# Patient Record
Sex: FEMALE | Race: BLACK OR AFRICAN AMERICAN | NOT HISPANIC OR LATINO | Employment: UNEMPLOYED | URBAN - METROPOLITAN AREA
[De-identification: names, ages, dates, MRNs, and addresses within clinical notes are randomized per-mention and may not be internally consistent; named-entity substitution may affect disease eponyms.]

---

## 2017-02-27 ENCOUNTER — HOSPITAL ENCOUNTER (EMERGENCY)
Facility: CLINIC | Age: 32
Discharge: HOME | End: 2017-02-27
Attending: EMERGENCY MEDICINE | Admitting: EMERGENCY MEDICINE
Payer: MEDICAID

## 2017-02-27 ENCOUNTER — APPOINTMENT (OUTPATIENT)
Dept: ULTRASOUND IMAGING | Facility: CLINIC | Age: 32
End: 2017-02-27
Attending: EMERGENCY MEDICINE
Payer: MEDICAID

## 2017-02-27 VITALS
TEMPERATURE: 99 F | RESPIRATION RATE: 16 BRPM | DIASTOLIC BLOOD PRESSURE: 57 MMHG | SYSTOLIC BLOOD PRESSURE: 102 MMHG | HEART RATE: 71 BPM | OXYGEN SATURATION: 99 % | WEIGHT: 130.06 LBS

## 2017-02-27 DIAGNOSIS — Z3A.00 WEEKS OF GESTATION OF PREGNANCY NOT SPECIFIED: ICD-10-CM

## 2017-02-27 DIAGNOSIS — O26.891 PELVIC PAIN AFFECTING PREGNANCY IN FIRST TRIMESTER, ANTEPARTUM: ICD-10-CM

## 2017-02-27 DIAGNOSIS — R10.2 PELVIC PAIN AFFECTING PREGNANCY IN FIRST TRIMESTER, ANTEPARTUM: ICD-10-CM

## 2017-02-27 DIAGNOSIS — O23.41 INFECTION OF URINARY TRACT IN PREGNANCY IN FIRST TRIMESTER: ICD-10-CM

## 2017-02-27 DIAGNOSIS — R10.2 ADNEXAL TENDERNESS, RIGHT: ICD-10-CM

## 2017-02-27 DIAGNOSIS — N39.0 URINARY TRACT INFECTION WITHOUT HEMATURIA, SITE UNSPECIFIED: ICD-10-CM

## 2017-02-27 LAB
ALBUMIN SERPL-MCNC: 4 G/DL (ref 3.4–5)
ALBUMIN UR-MCNC: NEGATIVE MG/DL
ALP SERPL-CCNC: 56 U/L (ref 40–150)
ALT SERPL W P-5'-P-CCNC: 29 U/L (ref 0–50)
ANION GAP SERPL CALCULATED.3IONS-SCNC: 4 MMOL/L (ref 3–14)
APPEARANCE UR: CLEAR
AST SERPL W P-5'-P-CCNC: 26 U/L (ref 0–45)
B-HCG SERPL-ACNC: 335 IU/L
BACTERIA #/AREA URNS HPF: ABNORMAL /HPF
BASOPHILS # BLD AUTO: 0.1 10E9/L (ref 0–0.2)
BASOPHILS NFR BLD AUTO: 0.5 %
BILIRUB SERPL-MCNC: 0.5 MG/DL (ref 0.2–1.3)
BILIRUB UR QL STRIP: NEGATIVE
BUN SERPL-MCNC: 13 MG/DL (ref 7–30)
CALCIUM SERPL-MCNC: 9 MG/DL (ref 8.5–10.1)
CHLORIDE SERPL-SCNC: 109 MMOL/L (ref 94–109)
CO2 SERPL-SCNC: 26 MMOL/L (ref 20–32)
COLOR UR AUTO: YELLOW
CREAT SERPL-MCNC: 0.84 MG/DL (ref 0.52–1.04)
DIFFERENTIAL METHOD BLD: NORMAL
EOSINOPHIL # BLD AUTO: 0.1 10E9/L (ref 0–0.7)
EOSINOPHIL NFR BLD AUTO: 1.3 %
ERYTHROCYTE [DISTWIDTH] IN BLOOD BY AUTOMATED COUNT: 13.5 % (ref 10–15)
GFR SERPL CREATININE-BSD FRML MDRD: 78 ML/MIN/1.7M2
GLUCOSE SERPL-MCNC: 80 MG/DL (ref 70–99)
GLUCOSE UR STRIP-MCNC: NEGATIVE MG/DL
HCG UR QL: POSITIVE
HCT VFR BLD AUTO: 38 % (ref 35–47)
HGB BLD-MCNC: 12.9 G/DL (ref 11.7–15.7)
HGB UR QL STRIP: NEGATIVE
IMM GRANULOCYTES # BLD: 0 10E9/L (ref 0–0.4)
IMM GRANULOCYTES NFR BLD: 0.1 %
INTERNAL QC OK POCT: YES
KETONES UR STRIP-MCNC: NEGATIVE MG/DL
LEUKOCYTE ESTERASE UR QL STRIP: NEGATIVE
LIPASE SERPL-CCNC: 260 U/L (ref 73–393)
LYMPHOCYTES # BLD AUTO: 3.1 10E9/L (ref 0.8–5.3)
LYMPHOCYTES NFR BLD AUTO: 32.9 %
MCH RBC QN AUTO: 31.9 PG (ref 26.5–33)
MCHC RBC AUTO-ENTMCNC: 33.9 G/DL (ref 31.5–36.5)
MCV RBC AUTO: 94 FL (ref 78–100)
MICRO REPORT STATUS: ABNORMAL
MONOCYTES # BLD AUTO: 0.3 10E9/L (ref 0–1.3)
MONOCYTES NFR BLD AUTO: 3.5 %
NEUTROPHILS # BLD AUTO: 5.8 10E9/L (ref 1.6–8.3)
NEUTROPHILS NFR BLD AUTO: 61.7 %
NITRATE UR QL: POSITIVE
NRBC # BLD AUTO: 0 10*3/UL
NRBC BLD AUTO-RTO: 0 /100
PH UR STRIP: 8 PH (ref 5–7)
PLATELET # BLD AUTO: 279 10E9/L (ref 150–450)
POTASSIUM SERPL-SCNC: 4.4 MMOL/L (ref 3.4–5.3)
PROT SERPL-MCNC: 7.8 G/DL (ref 6.8–8.8)
RBC # BLD AUTO: 4.04 10E12/L (ref 3.8–5.2)
RBC #/AREA URNS AUTO: <1 /HPF (ref 0–2)
SODIUM SERPL-SCNC: 139 MMOL/L (ref 133–144)
SP GR UR STRIP: 1.02 (ref 1–1.03)
SPECIMEN SOURCE: ABNORMAL
SQUAMOUS #/AREA URNS AUTO: 4 /HPF (ref 0–1)
URN SPEC COLLECT METH UR: ABNORMAL
UROBILINOGEN UR STRIP-MCNC: NORMAL MG/DL (ref 0–2)
WBC # BLD AUTO: 9.5 10E9/L (ref 4–11)
WBC #/AREA URNS AUTO: 1 /HPF (ref 0–2)
WET PREP SPEC: ABNORMAL

## 2017-02-27 PROCEDURE — 80053 COMPREHEN METABOLIC PANEL: CPT | Performed by: EMERGENCY MEDICINE

## 2017-02-27 PROCEDURE — 81001 URINALYSIS AUTO W/SCOPE: CPT | Performed by: EMERGENCY MEDICINE

## 2017-02-27 PROCEDURE — 99284 EMERGENCY DEPT VISIT MOD MDM: CPT | Mod: Z6 | Performed by: EMERGENCY MEDICINE

## 2017-02-27 PROCEDURE — 76801 OB US < 14 WKS SINGLE FETUS: CPT

## 2017-02-27 PROCEDURE — 84702 CHORIONIC GONADOTROPIN TEST: CPT | Performed by: EMERGENCY MEDICINE

## 2017-02-27 PROCEDURE — 36415 COLL VENOUS BLD VENIPUNCTURE: CPT | Performed by: EMERGENCY MEDICINE

## 2017-02-27 PROCEDURE — 25000132 ZZH RX MED GY IP 250 OP 250 PS 637: Performed by: EMERGENCY MEDICINE

## 2017-02-27 PROCEDURE — 87491 CHLMYD TRACH DNA AMP PROBE: CPT | Performed by: EMERGENCY MEDICINE

## 2017-02-27 PROCEDURE — 83690 ASSAY OF LIPASE: CPT | Performed by: EMERGENCY MEDICINE

## 2017-02-27 PROCEDURE — 81025 URINE PREGNANCY TEST: CPT | Performed by: EMERGENCY MEDICINE

## 2017-02-27 PROCEDURE — 85025 COMPLETE CBC W/AUTO DIFF WBC: CPT | Performed by: EMERGENCY MEDICINE

## 2017-02-27 PROCEDURE — 87210 SMEAR WET MOUNT SALINE/INK: CPT | Performed by: EMERGENCY MEDICINE

## 2017-02-27 PROCEDURE — 99284 EMERGENCY DEPT VISIT MOD MDM: CPT | Mod: 25 | Performed by: EMERGENCY MEDICINE

## 2017-02-27 PROCEDURE — 87591 N.GONORRHOEAE DNA AMP PROB: CPT | Performed by: EMERGENCY MEDICINE

## 2017-02-27 RX ORDER — KETOROLAC TROMETHAMINE 30 MG/ML
30 INJECTION, SOLUTION INTRAMUSCULAR; INTRAVENOUS ONCE
Status: DISCONTINUED | OUTPATIENT
Start: 2017-02-27 | End: 2017-02-27

## 2017-02-27 RX ORDER — ACETAMINOPHEN 325 MG/1
650 TABLET ORAL ONCE
Status: COMPLETED | OUTPATIENT
Start: 2017-02-27 | End: 2017-02-27

## 2017-02-27 RX ORDER — NITROFURANTOIN 25; 75 MG/1; MG/1
100 CAPSULE ORAL 2 TIMES DAILY
Qty: 10 CAPSULE | Refills: 0 | Status: SHIPPED | OUTPATIENT
Start: 2017-02-27 | End: 2017-03-04

## 2017-02-27 RX ADMIN — ACETAMINOPHEN 650 MG: 325 TABLET, FILM COATED ORAL at 13:25

## 2017-02-27 ASSESSMENT — ENCOUNTER SYMPTOMS
FREQUENCY: 0
CHILLS: 1
VOMITING: 0
SHORTNESS OF BREATH: 0
CONSTIPATION: 0
ADENOPATHY: 0
BRUISES/BLEEDS EASILY: 0
BACK PAIN: 1
DYSURIA: 0
COLOR CHANGE: 0
HEMATURIA: 0
FLANK PAIN: 0
NAUSEA: 0
DIFFICULTY URINATING: 0
ABDOMINAL PAIN: 0
NECK PAIN: 0
FEVER: 0
LIGHT-HEADEDNESS: 0
POLYDIPSIA: 0
DIARRHEA: 0
AGITATION: 0

## 2017-02-27 NOTE — ED NOTES
Pt complains of right pelvic pain radiating across abdomin to left side., also complains of right back flank pain.

## 2017-02-27 NOTE — ED AVS SNAPSHOT
[facility], Emergency Department    [address]    [place] [address]    Phone:  [phone]    Fax:  [phone]                                       Akua Gr   MRN: [id number]    Department:  [facility], Emergency Department   Date of Visit:  2/27/2017           Patient Information     Date Of Birth          1985        Your diagnoses for this visit were:     Pelvic pain affecting pregnancy in first trimester, antepartum     Urinary tract infection without hematuria, site unspecified        You were seen by Rigoberto Ramon MD.        Discharge Instructions       Please make an appointment to follow up with OB/Gyn--[facility] (phone: [phone]) in 2 days for repeat pregnancy hormone check since we could not rule out ectopic pregnancy today. Your pregnancy hormone level is 335 today. If your symptoms significantly worsen please come back to the emergency department. You can take Tylenol for pain, if needed.    Abdominal Pain and Early Pregnancy    (To rule out ectopic pregnancy or miscarriage)  Our tests show that you are pregnant, but the exact cause of your pain isn t clear.  Some pain and bleeding are common early in pregnancy. Often they stop, and you can go on to have a normal pregnancy and baby. Other times the pain or bleeding can be signs of a miscarriage or ectopic pregnancy. An ectopic pregnancy is a very serious problem. At this time it is unclear if your pregnancy will continue normally, if you will have a miscarriage, or if you could have an ectopic pregnancy. Below is some information about this.  Miscarriage  At this time we don t know whether you will have a miscarriage, or if things will clear up and your pregnancy will continue normally. We understand that this is emotionally difficult. There is little we can say to change the way you feel. But understand that miscarriages are common.  About 1 or 2 out of every 10 pregnancies end this way. Some end  even before you know you are pregnant. This happens for a number of reasons, and usually we never figure out why. It s important you know that it is not your fault. It didn t happen because you did anything wrong.  Having sex or exercising does not cause a miscarriage. These activities are usually safe unless you have pain or bleeding or your doctor tells you to stop. Even minor falls won t cause a miscarriage. Miscarriages happen because things were not developing as they were supposed to. No medicine can prevent a miscarriage.  Ectopic pregnancy  In a normal pregnancy, the fertilized egg attaches to the wall of the womb (uterus). In an ectopic or tubal pregnancy, the fertilized egg attaches outside the uterus, usually in the fallopian tube. Very rarely, the egg attaches to an ovary or somewhere else in the abdomen. An ectopic pregnancy is much less common than a miscarriage, but it is very serious. The baby cannot survive, and as it grows it can rupture the tube. This can cause internal bleeding and even death. Risk factors for an ectopic are:    An ectopic pregnancy in the past    Pelvic inflammatory disease, or PID    Endometriosis    Smoking    An IUD  Additional tests  Because we don t know what s causing your symptoms, you will need more tests to help your doctor figure out what the problem is. You may need:  Ultrasound  An ultrasound can usually find a normal pregnancy as early as 4 to 5 weeks along. If the ultrasound does not show the baby inside the uterus, it means that:    You have a normal pregnancy less than 4 weeks along, or    You are having or recently had a miscarriage, or    You have an ectopic pregnancy  Quantitative HCG  This test measures the amount of a pregnancy hormone in your blood. Comparing today's test result to a repeat test in 2 days will show whether you have a normal pregnancy.  Laparoscopy  This is a type of surgery. The doctor will put a tube with a light inside your belly  (abdomen) to look directly at your pelvic organs. This test is used when it is not safe to wait 2 days for blood test results.     Important information  If you do have an ectopic pregnancy, there is a small chance that the growing fetus can tear the fallopian tube. This can cause severe internal bleeding. If this happens, you may have:    Sudden severe pain in your lower abdomen    Vaginal bleeding    Weakness, dizziness, and sometimes fainting  If any of these symptoms occur:    Call 911 or return immediately to the hospital.    Do not drive yourself.    Do not go to your doctor's office or to a clinic - go to the hospital.      Home care  Follow these guidelines to help care for yourself at home:    Rest until your next exam. Don t do anything strenuous.    Eat a light diet with foods that are easy to digest.    Don t have sex until your doctor says it s OK.  Follow-up care  Follow up with your doctor for repeat blood testing. If you were told to have a repeat blood test in 2 days, it s important to get it done.  If you had an X-ray or ultrasound, a radiologist will review it. You will be told of any new findings that may affect your care.  Call 911  Call 911 if you have any of these:    Severe pain and very heavy bleeding    Severe lightheadedness, passing out, or fainting    Rapid heart rate    Trouble breathing    Confused or difficulty waking up  When to seek medical advice  Call your health care provider right away  if any of these occur:    The pain in your abdomen gets worse, either suddenly or gradually.    You are dizzy or weak when you stand.    You have heavy vaginal bleeding. This means soaking 1 pad an hour for 3 hours.    You have vaginal bleeding for more than 5 days.    You have repeated vomiting or diarrhea.    The pain in your abdomen moves to the lower right.    You have blood in your vomit or bowel movements. This will be dark red or black.    You have a fever of 100.4 F (38 C) or higher, or  "as directed by your health care provider       7161-3739 [employer]. [address] [place] [address], [place] [address]. All rights reserved. This information is not intended as a substitute for professional medical care. Always follow your healthcare professional's instructions.            *BLADDER INFECTION,Female (Adult)    A bladder infection (\"cystitis\" or \"UTI\") usually causes a constant urge to urinate and a burning when passing urine. Urine may be cloudy, smelly or dark. There may be pain in the lower abdomen. A bladder infection occurs when bacteria from the vaginal area enter the bladder opening (urethra). This can occur from sexual intercourse, wearing tight clothing, dehydration and other factors.  HOME CARE:  1. Drink lots of fluids (at least 6-8 glasses a day, unless you must restrict fluids for other medical reasons). This will force the medicine into your urinary system and flush the bacteria out of your body. Cranberry juice has been shown to help clear out the bacteria.  2. Avoid sexual intercourse until your symptoms are gone.  3. A bladder infection is treated with antibiotics. You may also be given Pyridium (generic = phenazopyridine) to reduce the burning sensation. This medicine will cause your urine to become a bright orange color. The orange urine may stain clothing. You may wear a pad or panty-liner to protect clothing.  PREVENTING FUTURE INFECTIONS:  1. Always wipe from front to back after a bowel movement.  2. Keep the genital area clean and dry.  3. Drink plenty of fluids each day to avoid dehydration.  4. Urinate right after intercourse to flush out the bladder.  5. Wear cotton underwear and cotton-lined panty hose; avoid tight-fitting pants.  6. If you are on birth control pills and are having frequent bladder infections, discuss with your doctor.  FOLLOW UP: Return to this facility or see your doctor if ALL symptoms are not gone after three days of treatment.  GET PROMPT MEDICAL " ATTENTION if any of the following occur:    Fever over 101 F (38.3 C)    No improvement by the third day of treatment    Increasing back or abdominal pain    Repeated vomiting; unable to keep medicine down    Weakness, dizziness or fainting    Vaginal discharge    Pain, redness or swelling in the labia (outer vaginal area)    8181-7232 [employer], [address] [place] [address], [place] [id number]. All rights reserved. This information is not intended as a substitute for professional medical care. Always follow your healthcare professional's instructions.        24 Hour Appointment Hotline       To make an appointment at any [facility], call [phone] ([phone]). If you don't have a family doctor or clinic, we will help you find one. [place] clinics are conveniently located to serve the needs of you and your family.             Review of your medicines      START taking        Dose / Directions Last dose taken    nitrofurantoin (macrocrystal-monohydrate) 100 MG capsule   Commonly known as:  MACROBID   Dose:  100 mg   Quantity:  10 capsule        Take 1 capsule (100 mg) by mouth 2 times daily for 5 days   Refills:  0          Our records show that you are taking the medicines listed below. If these are incorrect, please call your family doctor or clinic.        Dose / Directions Last dose taken    albuterol 108 (90 BASE) MCG/ACT Inhaler   Commonly known as:  PROAIR HFA/PROVENTIL HFA/VENTOLIN HFA   Dose:  2 puff   Quantity:  1 Inhaler        Inhale 2 puffs into the lungs every 6 hours as needed for shortness of breath / dyspnea or wheezing   Refills:  0        benzonatate 100 MG capsule   Commonly known as:  TESSALON   Dose:  100 mg   Quantity:  42 capsule        Take 1 capsule (100 mg) by mouth 3 times daily as needed for cough   Refills:  0        guaiFENesin-codeine 100-10 MG/5ML Soln solution   Commonly known as:  ROBITUSSIN AC   Dose:  2 tsp.   Quantity:  120 mL        Take 10 mLs by mouth every  "4 hours as needed for cough   Refills:  0                Prescriptions were sent or printed at these locations (1 Prescription)                   Other Prescriptions                Printed at Department/Unit printer (1 of 1)         nitrofurantoin, macrocrystal-monohydrate, (MACROBID) 100 MG capsule                Procedures and tests performed during your visit     CBC with platelets differential    Chlamydia trachomatis PCR    Comprehensive metabolic panel    HCG quantitative pregnancy    Lipase    Neisseria gonorrhoea PCR    Peripheral IV catheter    Prep for procedure - pelvic exam    UA with Microscopic    US OB < 14 Weeks w Transvaginal    Wet prep    hCG qual urine POCT      Orders Needing Specimen Collection     None      Pending Results     Date and Time Order Name Status Description    2/27/2017 1153 US OB < 14 Weeks w Transvaginal Preliminary     2/27/2017 1115 Neisseria gonorrhoea PCR In process     2/27/2017 1115 Chlamydia trachomatis PCR In process             Pending Culture Results     Date and Time Order Name Status Description    2/27/2017 1115 Neisseria gonorrhoea PCR In process     2/27/2017 1115 Chlamydia trachomatis PCR In process             Thank you for choosing [facility]       Thank you for choosing [facility] for your care. Our goal is always to provide you with excellent care. Hearing back from our patients is one way we can continue to improve our services. Please take a few minutes to complete the written survey that you may receive in the mail after you visit with us. Thank you!        [employer] Information     [employer] lets you send messages to your doctor, view your test results, renew your prescriptions, schedule appointments and more. To sign up, go to [email or web address] . Click on \"Log in\" on the left side of the screen, which will take you to the Welcome page. Then click on \"Sign up Now\" on the right side of the page.     You will be asked to enter the access code listed below, " as well as some personal information. Please follow the directions to create your username and password.     Your access code is: [email or web address]  Expires: 2017  1:23 PM     Your access code will  in 90 days. If you need help or a new code, please call your [facility] clinic or [phone].        Care EveryWhere ID     This is your Care EveryWhere ID. This could be used by other organizations to access your [facility] medical records  [id number]        After Visit Summary       This is your record. Keep this with you and show to your community pharmacist(s) and doctor(s) at your next visit.

## 2017-02-27 NOTE — DISCHARGE INSTRUCTIONS
Please make an appointment to follow up with OB/Gyn--[facility] (phone: [phone]) in 2 days for repeat pregnancy hormone check since we could not rule out ectopic pregnancy today. Your pregnancy hormone level is 335 today. If your symptoms significantly worsen please come back to the emergency department. You can take Tylenol for pain, if needed.    Abdominal Pain and Early Pregnancy    (To rule out ectopic pregnancy or miscarriage)  Our tests show that you are pregnant, but the exact cause of your pain isn t clear.  Some pain and bleeding are common early in pregnancy. Often they stop, and you can go on to have a normal pregnancy and baby. Other times the pain or bleeding can be signs of a miscarriage or ectopic pregnancy. An ectopic pregnancy is a very serious problem. At this time it is unclear if your pregnancy will continue normally, if you will have a miscarriage, or if you could have an ectopic pregnancy. Below is some information about this.  Miscarriage  At this time we don t know whether you will have a miscarriage, or if things will clear up and your pregnancy will continue normally. We understand that this is emotionally difficult. There is little we can say to change the way you feel. But understand that miscarriages are common.  About 1 or 2 out of every 10 pregnancies end this way. Some end even before you know you are pregnant. This happens for a number of reasons, and usually we never figure out why. It s important you know that it is not your fault. It didn t happen because you did anything wrong.  Having sex or exercising does not cause a miscarriage. These activities are usually safe unless you have pain or bleeding or your doctor tells you to stop. Even minor falls won t cause a miscarriage. Miscarriages happen because things were not developing as they were supposed to. No medicine can prevent a miscarriage.  Ectopic pregnancy  In a normal pregnancy, the fertilized egg  attaches to the wall of the womb (uterus). In an ectopic or tubal pregnancy, the fertilized egg attaches outside the uterus, usually in the fallopian tube. Very rarely, the egg attaches to an ovary or somewhere else in the abdomen. An ectopic pregnancy is much less common than a miscarriage, but it is very serious. The baby cannot survive, and as it grows it can rupture the tube. This can cause internal bleeding and even death. Risk factors for an ectopic are:    An ectopic pregnancy in the past    Pelvic inflammatory disease, or PID    Endometriosis    Smoking    An IUD  Additional tests  Because we don t know what s causing your symptoms, you will need more tests to help your doctor figure out what the problem is. You may need:  Ultrasound  An ultrasound can usually find a normal pregnancy as early as 4 to 5 weeks along. If the ultrasound does not show the baby inside the uterus, it means that:    You have a normal pregnancy less than 4 weeks along, or    You are having or recently had a miscarriage, or    You have an ectopic pregnancy  Quantitative HCG  This test measures the amount of a pregnancy hormone in your blood. Comparing today's test result to a repeat test in 2 days will show whether you have a normal pregnancy.  Laparoscopy  This is a type of surgery. The doctor will put a tube with a light inside your belly (abdomen) to look directly at your pelvic organs. This test is used when it is not safe to wait 2 days for blood test results.     Important information  If you do have an ectopic pregnancy, there is a small chance that the growing fetus can tear the fallopian tube. This can cause severe internal bleeding. If this happens, you may have:    Sudden severe pain in your lower abdomen    Vaginal bleeding    Weakness, dizziness, and sometimes fainting  If any of these symptoms occur:    Call 911 or return immediately to the hospital.    Do not drive yourself.    Do not go to your doctor's office or to a  "clinic - go to the hospital.      Home care  Follow these guidelines to help care for yourself at home:    Rest until your next exam. Don t do anything strenuous.    Eat a light diet with foods that are easy to digest.    Don t have sex until your doctor says it s OK.  Follow-up care  Follow up with your doctor for repeat blood testing. If you were told to have a repeat blood test in 2 days, it s important to get it done.  If you had an X-ray or ultrasound, a radiologist will review it. You will be told of any new findings that may affect your care.  Call 911  Call 911 if you have any of these:    Severe pain and very heavy bleeding    Severe lightheadedness, passing out, or fainting    Rapid heart rate    Trouble breathing    Confused or difficulty waking up  When to seek medical advice  Call your health care provider right away  if any of these occur:    The pain in your abdomen gets worse, either suddenly or gradually.    You are dizzy or weak when you stand.    You have heavy vaginal bleeding. This means soaking 1 pad an hour for 3 hours.    You have vaginal bleeding for more than 5 days.    You have repeated vomiting or diarrhea.    The pain in your abdomen moves to the lower right.    You have blood in your vomit or bowel movements. This will be dark red or black.    You have a fever of 100.4 F (38 C) or higher, or as directed by your health care provider       7254-7000 [employer]. [address] [place] [address]. All rights reserved. This information is not intended as a substitute for professional medical care. Always follow your healthcare professional's instructions.            *BLADDER INFECTION,Female (Adult)    A bladder infection (\"cystitis\" or \"UTI\") usually causes a constant urge to urinate and a burning when passing urine. Urine may be cloudy, smelly or dark. There may be pain in the lower abdomen. A bladder infection occurs when bacteria from the vaginal area enter the " bladder opening (urethra). This can occur from sexual intercourse, wearing tight clothing, dehydration and other factors.  HOME CARE:  1. Drink lots of fluids (at least 6-8 glasses a day, unless you must restrict fluids for other medical reasons). This will force the medicine into your urinary system and flush the bacteria out of your body. Cranberry juice has been shown to help clear out the bacteria.  2. Avoid sexual intercourse until your symptoms are gone.  3. A bladder infection is treated with antibiotics. You may also be given Pyridium (generic = phenazopyridine) to reduce the burning sensation. This medicine will cause your urine to become a bright orange color. The orange urine may stain clothing. You may wear a pad or panty-liner to protect clothing.  PREVENTING FUTURE INFECTIONS:  1. Always wipe from front to back after a bowel movement.  2. Keep the genital area clean and dry.  3. Drink plenty of fluids each day to avoid dehydration.  4. Urinate right after intercourse to flush out the bladder.  5. Wear cotton underwear and cotton-lined panty hose; avoid tight-fitting pants.  6. If you are on birth control pills and are having frequent bladder infections, discuss with your doctor.  FOLLOW UP: Return to this facility or see your doctor if ALL symptoms are not gone after three days of treatment.  GET PROMPT MEDICAL ATTENTION if any of the following occur:    Fever over 101 F (38.3 C)    No improvement by the third day of treatment    Increasing back or abdominal pain    Repeated vomiting; unable to keep medicine down    Weakness, dizziness or fainting    Vaginal discharge    Pain, redness or swelling in the labia (outer vaginal area)    5148-2765 [employer], [address] [place] [address], [place] [address]. All rights reserved. This information is not intended as a substitute for professional medical care. Always follow your healthcare professional's instructions.

## 2017-02-27 NOTE — ED AVS SNAPSHOT
[facility], [place], Emergency Department    [address]    [facility] [address]    Phone:  [phone]    Fax:  [phone]                                       Akua Gr   MRN: [id number]    Department:  [facility], Emergency Department   Date of Visit:  2/27/2017           After Visit Summary Signature Page     I have received my discharge instructions, and my questions have been answered. I have discussed any challenges I see with this plan with the nurse or doctor.    ..........................................................................................................................................  Patient/Patient Representative Signature      ..........................................................................................................................................  Patient Representative Print Name and Relationship to Patient    ..................................................               ................................................  Date                                            Time    ..........................................................................................................................................  Reviewed by Signature/Title    ...................................................              ..............................................  Date                                                            Time

## 2017-02-27 NOTE — ED PROVIDER NOTES
History     Chief Complaint   Patient presents with     Abdominal Pain     lower abdominal pain for 3 days     HPI  Akua Gr is a 31 year old female with a history of bladder infections and s/p  3 years ago who presents to the Emergency Department for evaluation of abdominal pain. For the past 3 days, the patient has been experiencing constant, bilateral, sharp, non-radiating, mild lower abdominal pain, worse on the right as well as low back pain that began yesterday. She has been taking Advil and Tylenol with minimal relief. She denies trauma/falls, fever, nausea, vomiting, diarrhea, constipation, vaginal bleeding, vaginal discharge, urinary symptoms or any other concerns or complaints at this time. No history of kidney stones, appendectomy or cholecystectomy. LMP .    History reviewed. No pertinent past medical history.    Past Surgical History   Procedure Laterality Date      section         No family history on file.    Social History   Substance Use Topics     Smoking status: Current Every Day Smoker     Packs/day: 0.25     Smokeless tobacco: Not on file     Alcohol use No       No current facility-administered medications for this encounter.      Current Outpatient Prescriptions   Medication     nitrofurantoin, macrocrystal-monohydrate, (MACROBID) 100 MG capsule     benzonatate (TESSALON) 100 MG capsule     albuterol (PROAIR HFA, PROVENTIL HFA, VENTOLIN HFA) 108 (90 BASE) MCG/ACT inhaler     guaiFENesin-codeine (ROBITUSSIN AC) 100-10 MG/5ML SOLN        Allergies   Allergen Reactions     Penicillins      I have reviewed the Medications, Allergies, Past Medical and Surgical History, and Social History in the Epic system.    Review of Systems   Constitutional: Positive for chills. Negative for fever.   HENT: Negative for congestion.    Eyes: Negative for visual disturbance.   Respiratory: Negative for shortness of breath.    Cardiovascular: Negative for chest pain.   Gastrointestinal:  Negative for abdominal pain, constipation, diarrhea, nausea and vomiting.   Endocrine: Negative for polydipsia and polyuria.   Genitourinary: Positive for pelvic pain. Negative for difficulty urinating, dysuria, flank pain, frequency, hematuria, urgency, vaginal bleeding and vaginal discharge.   Musculoskeletal: Positive for back pain ( right, lower). Negative for neck pain.   Skin: Negative for color change.   Allergic/Immunologic: Negative for immunocompromised state.   Neurological: Negative for light-headedness.   Hematological: Negative for adenopathy. Does not bruise/bleed easily.   Psychiatric/Behavioral: Negative for agitation and behavioral problems.       Physical Exam   BP: 102/57  Pulse: 71  Temp: 99  F (37.2  C)  Resp: 16  Weight: 59 kg (130 lb 1 oz)  SpO2: 99 %  Physical Exam   Constitutional: She is oriented to person, place, and time. She appears well-developed and well-nourished. No distress.   HENT:   Head: Normocephalic and atraumatic.   Mouth/Throat: Oropharynx is clear and moist. No oropharyngeal exudate.   Eyes: Conjunctivae and EOM are normal. No scleral icterus.   Neck: Normal range of motion.   Cardiovascular: Normal rate, normal heart sounds and intact distal pulses.    Pulmonary/Chest: Effort normal and breath sounds normal. No respiratory distress. She has no wheezes. She has no rales.   Abdominal: Soft. Bowel sounds are normal. She exhibits no distension. There is no tenderness. There is no rebound and no guarding.   Genitourinary: Vagina normal. There is no rash, tenderness, lesion or injury on the right labia. There is no rash, tenderness, lesion or injury on the left labia. Cervix exhibits no motion tenderness and no discharge. Right adnexum displays no mass, no tenderness and no fullness. Left adnexum displays no mass, no tenderness and no fullness. No bleeding in the vagina. No vaginal discharge found.   Musculoskeletal: Normal range of motion. She exhibits no edema or tenderness.    Neurological: She is alert and oriented to person, place, and time. She has normal strength and normal reflexes. She displays normal reflexes. No cranial nerve deficit or sensory deficit. She exhibits normal muscle tone. Coordination and gait normal. GCS eye subscore is 4. GCS verbal subscore is 5. GCS motor subscore is 6.   Reflex Scores:       Patellar reflexes are 2+ on the right side and 2+ on the left side.       Achilles reflexes are 2+ on the right side and 2+ on the left side.  Strength 5/5 in b/l UEs with  and b/l LEs with dorsi- and plantar-flexion against resistance. Sensation to light touch and 2-point discrimination intact in b/l distal UEs and LEs. No saddle anesthesia. Normal gait. 2+ pattellar and Achilles reflexes b/l.     Skin: Skin is warm. No rash noted. She is not diaphoretic.   Psychiatric: She has a normal mood and affect. Her behavior is normal. Judgment and thought content normal.   Nursing note and vitals reviewed.      ED Course     10:57 AM  The patient was seen and examined by Dr. Ramon in Room 5.     ED Course     Procedures             Critical Care time:  none               Labs Ordered and Resulted from Time of ED Arrival Up to the Time of Departure from the ED   ROUTINE UA WITH MICROSCOPIC - Abnormal; Notable for the following:        Result Value    pH Urine 8.0 (*)     Nitrite Urine Positive (*)     Bacteria Urine Moderate (*)     Squamous Epithelial /HPF Urine 4 (*)     All other components within normal limits   HCG QUAL URINE POCT - Abnormal; Notable for the following:    WET PREP - Abnormal; Notable for the following:     Wet Prep   (*)     Value: Few PMNs seen  No Trichomonas seen  Clue cells seen  No yeast seen      All other components within normal limits   CBC WITH PLATELETS DIFFERENTIAL   COMPREHENSIVE METABOLIC PANEL   LIPASE   HCG QUANTITATIVE PREGNANCY   PERIPHERAL IV CATHETER   PREP FOR PROCEDURE   CHLAMYDIA TRACHOMATIS PCR   NEISSERIA GONORRHOEAE PCR        Assessments & Plan (with Medical Decision Making)   31-year-old woman presents with pelvic pain and right lower back pain. Differential diagnosis: Pregnancy, ectopic pregnancy, ovarian cyst, acute appendicitis, kidney stone, pyelonephritis, cystitis.    After thorough history and physical exam patient appears to be in no acute distress. I will obtain laboratory studies for further diagnostic evaluation. Patient agrees with the plan.     Patient s laboratory studies returned with positive point of care pregnancy test. Urinalysis showed positive nitrates. There is no leukocytosis, WBCs normal at 9500. There is no anemia, hemoglobin is normal at 12.9. electrolytes show no evidence of dehydration, creatinine is 0.84. LFTs and lipase are normal. HCG level is 335. I reviewed her pelvic ultrasound and read the radiology reports; there is no definitive signs of intrauterine pregnancy or ovarian torsion. At this point patient given a dose of oral Tylenol. She is stable for discharge with OBGYN follow-up and repeat hCG levels in 2 days since we cannot rule out ectopic pregnancy. I will also give her a prescription for nitrofurantoin for treatment of her UTI. She agrees with this plan. She ll return if her symptoms worsen.    I have reviewed the nursing notes.  I have reviewed the findings, diagnosis, plan and need for follow up with the patient.  Discharge Medication List as of 2/27/2017  1:23 PM      START taking these medications    Details   nitrofurantoin, macrocrystal-monohydrate, (MACROBID) 100 MG capsule Take 1 capsule (100 mg) by mouth 2 times daily for 5 days, Disp-10 capsule, R-0, Local Print             Final diagnoses:   Pelvic pain affecting pregnancy in first trimester, antepartum   Urinary tract infection without hematuria, site unspecified     Monserrat BLISS, am serving as a trained medical scribe to document services personally performed by Rigoberto Ramon MD, based on the provider's statements to me.       I, Rigoberto Ramon MD, was physically present and have reviewed and verified the accuracy of this note documented by Monserrat Melchor.     2/27/2017   [facility], [place], EMERGENCY DEPARTMENT     Rigoberto Ramon MD  02/27/17 1551

## 2017-02-28 LAB
C TRACH DNA SPEC QL NAA+PROBE: NORMAL
N GONORRHOEA DNA SPEC QL NAA+PROBE: NORMAL
SPECIMEN SOURCE: NORMAL
SPECIMEN SOURCE: NORMAL

## 2017-03-01 ENCOUNTER — TELEPHONE (OUTPATIENT)
Dept: OBGYN | Facility: CLINIC | Age: 32
End: 2017-03-01

## 2017-03-01 DIAGNOSIS — Z32.01 PREGNANCY TEST POSITIVE: ICD-10-CM

## 2017-03-01 DIAGNOSIS — Z32.01 PREGNANCY TEST POSITIVE: Primary | ICD-10-CM

## 2017-03-01 LAB — B-HCG SERPL-ACNC: 943 IU/L

## 2017-03-01 PROCEDURE — 36415 COLL VENOUS BLD VENIPUNCTURE: CPT | Performed by: OBSTETRICS & GYNECOLOGY

## 2017-03-01 PROCEDURE — 84702 CHORIONIC GONADOTROPIN TEST: CPT | Performed by: OBSTETRICS & GYNECOLOGY

## 2017-03-01 NOTE — TELEPHONE ENCOUNTER
Pt new to [facility].  Pt was seen in ED to r/o ectopic on Monday and was told to follow-up with OB in 2 days.  Scheduled her to come in for hcg today and then will call with results for further follow-up.  Anything else you'd recommend at this time?  Corie Doll, RN

## 2017-03-19 ENCOUNTER — HOSPITAL ENCOUNTER (EMERGENCY)
Facility: CLINIC | Age: 32
Discharge: HOME | End: 2017-03-20
Attending: EMERGENCY MEDICINE | Admitting: EMERGENCY MEDICINE
Payer: COMMERCIAL

## 2017-03-19 DIAGNOSIS — O21.0 HYPEREMESIS GRAVIDARUM: ICD-10-CM

## 2017-03-19 DIAGNOSIS — Z3A.08 8 WEEKS GESTATION OF PREGNANCY: ICD-10-CM

## 2017-03-19 LAB
ALBUMIN SERPL-MCNC: 4 G/DL (ref 3.4–5)
ALBUMIN UR-MCNC: 10 MG/DL
ALP SERPL-CCNC: 56 U/L (ref 40–150)
ALT SERPL W P-5'-P-CCNC: 35 U/L (ref 0–50)
ANION GAP SERPL CALCULATED.3IONS-SCNC: 9 MMOL/L (ref 3–14)
APPEARANCE UR: ABNORMAL
AST SERPL W P-5'-P-CCNC: 18 U/L (ref 0–45)
B-HCG SERPL-ACNC: ABNORMAL IU/L (ref 0–5)
BACTERIA #/AREA URNS HPF: ABNORMAL /HPF
BASOPHILS # BLD AUTO: 0.1 10E9/L (ref 0–0.2)
BASOPHILS NFR BLD AUTO: 0.4 %
BILIRUB SERPL-MCNC: 1.2 MG/DL (ref 0.2–1.3)
BILIRUB UR QL STRIP: NEGATIVE
BUN SERPL-MCNC: 12 MG/DL (ref 7–30)
CALCIUM SERPL-MCNC: 8.9 MG/DL (ref 8.5–10.1)
CHLORIDE SERPL-SCNC: 109 MMOL/L (ref 94–109)
CO2 SERPL-SCNC: 23 MMOL/L (ref 20–32)
COLOR UR AUTO: YELLOW
CREAT SERPL-MCNC: 0.7 MG/DL (ref 0.52–1.04)
DIFFERENTIAL METHOD BLD: ABNORMAL
EOSINOPHIL # BLD AUTO: 0.2 10E9/L (ref 0–0.7)
EOSINOPHIL NFR BLD AUTO: 1.4 %
ERYTHROCYTE [DISTWIDTH] IN BLOOD BY AUTOMATED COUNT: 13.2 % (ref 10–15)
GFR SERPL CREATININE-BSD FRML MDRD: NORMAL ML/MIN/1.7M2
GLUCOSE SERPL-MCNC: 79 MG/DL (ref 70–99)
GLUCOSE UR STRIP-MCNC: NEGATIVE MG/DL
HCG UR QL: POSITIVE
HCT VFR BLD AUTO: 34.6 % (ref 35–47)
HGB BLD-MCNC: 11.9 G/DL (ref 11.7–15.7)
HGB UR QL STRIP: NEGATIVE
IMM GRANULOCYTES # BLD: 0 10E9/L (ref 0–0.4)
IMM GRANULOCYTES NFR BLD: 0.3 %
INTERNAL QC OK POCT: YES
KETONES UR STRIP-MCNC: 40 MG/DL
LEUKOCYTE ESTERASE UR QL STRIP: NEGATIVE
LYMPHOCYTES # BLD AUTO: 4.1 10E9/L (ref 0.8–5.3)
LYMPHOCYTES NFR BLD AUTO: 32.5 %
MCH RBC QN AUTO: 31.6 PG (ref 26.5–33)
MCHC RBC AUTO-ENTMCNC: 34.4 G/DL (ref 31.5–36.5)
MCV RBC AUTO: 92 FL (ref 78–100)
MONOCYTES # BLD AUTO: 0.6 10E9/L (ref 0–1.3)
MONOCYTES NFR BLD AUTO: 4.6 %
MUCOUS THREADS #/AREA URNS LPF: PRESENT /LPF
NEUTROPHILS # BLD AUTO: 7.6 10E9/L (ref 1.6–8.3)
NEUTROPHILS NFR BLD AUTO: 60.8 %
NITRATE UR QL: NEGATIVE
NRBC # BLD AUTO: 0 10*3/UL
NRBC BLD AUTO-RTO: 0 /100
PH UR STRIP: 5.5 PH (ref 5–7)
PLATELET # BLD AUTO: 275 10E9/L (ref 150–450)
POTASSIUM SERPL-SCNC: 3.6 MMOL/L (ref 3.4–5.3)
PROT SERPL-MCNC: 7.6 G/DL (ref 6.8–8.8)
RBC # BLD AUTO: 3.76 10E12/L (ref 3.8–5.2)
RBC #/AREA URNS AUTO: 1 /HPF (ref 0–2)
SODIUM SERPL-SCNC: 141 MMOL/L (ref 133–144)
SP GR UR STRIP: 1.02 (ref 1–1.03)
SQUAMOUS #/AREA URNS AUTO: 9 /HPF (ref 0–1)
URN SPEC COLLECT METH UR: ABNORMAL
UROBILINOGEN UR STRIP-MCNC: NORMAL MG/DL (ref 0–2)
WBC # BLD AUTO: 12.5 10E9/L (ref 4–11)
WBC #/AREA URNS AUTO: 4 /HPF (ref 0–2)

## 2017-03-19 PROCEDURE — 96361 HYDRATE IV INFUSION ADD-ON: CPT | Performed by: EMERGENCY MEDICINE

## 2017-03-19 PROCEDURE — 99284 EMERGENCY DEPT VISIT MOD MDM: CPT | Mod: 25 | Performed by: EMERGENCY MEDICINE

## 2017-03-19 PROCEDURE — 25000128 H RX IP 250 OP 636: Performed by: EMERGENCY MEDICINE

## 2017-03-19 PROCEDURE — 36415 COLL VENOUS BLD VENIPUNCTURE: CPT | Performed by: EMERGENCY MEDICINE

## 2017-03-19 PROCEDURE — 81025 URINE PREGNANCY TEST: CPT | Performed by: EMERGENCY MEDICINE

## 2017-03-19 PROCEDURE — 99284 EMERGENCY DEPT VISIT MOD MDM: CPT | Mod: Z6 | Performed by: EMERGENCY MEDICINE

## 2017-03-19 PROCEDURE — 84702 CHORIONIC GONADOTROPIN TEST: CPT | Performed by: EMERGENCY MEDICINE

## 2017-03-19 PROCEDURE — 85025 COMPLETE CBC W/AUTO DIFF WBC: CPT | Performed by: EMERGENCY MEDICINE

## 2017-03-19 PROCEDURE — 81001 URINALYSIS AUTO W/SCOPE: CPT | Performed by: EMERGENCY MEDICINE

## 2017-03-19 PROCEDURE — 96374 THER/PROPH/DIAG INJ IV PUSH: CPT | Performed by: EMERGENCY MEDICINE

## 2017-03-19 PROCEDURE — 80053 COMPREHEN METABOLIC PANEL: CPT | Performed by: EMERGENCY MEDICINE

## 2017-03-19 RX ORDER — ONDANSETRON 2 MG/ML
4 INJECTION INTRAMUSCULAR; INTRAVENOUS EVERY 30 MIN PRN
Status: DISCONTINUED | OUTPATIENT
Start: 2017-03-19 | End: 2017-03-20 | Stop reason: HOSPADM

## 2017-03-19 RX ORDER — ACETAMINOPHEN 325 MG/1
975 TABLET ORAL ONCE
Status: DISCONTINUED | OUTPATIENT
Start: 2017-03-19 | End: 2017-03-20 | Stop reason: HOSPADM

## 2017-03-19 RX ORDER — SODIUM CHLORIDE 9 MG/ML
1000 INJECTION, SOLUTION INTRAVENOUS CONTINUOUS
Status: DISCONTINUED | OUTPATIENT
Start: 2017-03-19 | End: 2017-03-20 | Stop reason: HOSPADM

## 2017-03-19 RX ORDER — PROMETHAZINE HYDROCHLORIDE 25 MG/ML
25 INJECTION, SOLUTION INTRAMUSCULAR; INTRAVENOUS ONCE
Status: COMPLETED | OUTPATIENT
Start: 2017-03-19 | End: 2017-03-20

## 2017-03-19 RX ADMIN — ONDANSETRON 4 MG: 2 INJECTION INTRAMUSCULAR; INTRAVENOUS at 22:54

## 2017-03-19 RX ADMIN — SODIUM CHLORIDE 1000 ML: 9 INJECTION, SOLUTION INTRAVENOUS at 22:54

## 2017-03-19 RX ADMIN — SODIUM CHLORIDE 1000 ML: 9 INJECTION, SOLUTION INTRAVENOUS at 23:38

## 2017-03-19 ASSESSMENT — ENCOUNTER SYMPTOMS
FEVER: 0
VOMITING: 1
DIARRHEA: 0
HEADACHES: 1
ABDOMINAL PAIN: 1
APPETITE CHANGE: 1
NAUSEA: 1

## 2017-03-19 NOTE — ED AVS SNAPSHOT
[facility], [place], Emergency Department    [address]    [facility] [address]    Phone:  [phone]    Fax:  [phone]                                       Akua Gr   MRN: [id number]    Department:  [facility], Emergency Department   Date of Visit:  3/19/2017           After Visit Summary Signature Page     I have received my discharge instructions, and my questions have been answered. I have discussed any challenges I see with this plan with the nurse or doctor.    ..........................................................................................................................................  Patient/Patient Representative Signature      ..........................................................................................................................................  Patient Representative Print Name and Relationship to Patient    ..................................................               ................................................  Date                                            Time    ..........................................................................................................................................  Reviewed by Signature/Title    ...................................................              ..............................................  Date                                                            Time

## 2017-03-19 NOTE — ED AVS SNAPSHOT
[facility], Emergency Department    [address]    [place] [address]    Phone:  [phone]    Fax:  [phone]                                       Akua Gr   MRN: [id number]    Department:  [facility], Emergency Department   Date of Visit:  3/19/2017           Patient Information     Date Of Birth          1985        Your diagnoses for this visit were:     Hyperemesis gravidarum        You were seen by Maricel An MD.        Discharge Instructions           Please follow up with your Ob/GYN for further care.       Hyperemesis Gravidarum  Hyperemesis gravidarum is a severe form of  morning sickness  that can affect some women during pregnancy. It may develop around the 5th week and last until the 16th week of pregnancy. In some women, it may last longer. Symptoms include severe nausea and vomiting. This can lead to problems such as as weight loss and dehydration.  It is not clear what causes hyperemesis gravidarum. It may be due to rising hormone levels early in the pregnancy. It can be a serious threat to mother and fetus, if symptoms are severe. Therefore, follow the advice below carefully. If symptoms are not controlled by home measures, a hospital stay may be needed. IV (intravenous) fluids and medicines may be given.  Home care    Diet    Keep a log of the foods you eat and how they affect your symptoms. Avoid foods that trigger your symptoms.    Eat frequent small meals throughout the day rather than three large meals. This can help keep the stomach from being empty, which can make nausea worse.    Choose foods that are high in carbohydrates. Eating foods high in protein may also help. Limit greasy or spicy foods.    Before getting out of bed in the morning, try eating crackers or dry toast. This may help settle your stomach.    Drink cold, clear liquids. Drinking small amounts of liquids with electrolytes, such as sports drinks may help as well.    Medicine    If  needed, your healthcare provider may prescribe certain medicines to help relieve nausea and vomiting. Vitamin B6 and ginger may also be advised. Don t try any over-the-counter medicines or home remedies without talking to your provider first.  Follow-up care  Follow up with your healthcare provider, or as advised.  When to seek medical advice  Call your healthcare provider right away if any of these occur:    Signs of dehydration (dry mouth, extreme thirst, dark urine or little urine output, dizziness, weakness, or fainting)    Vomiting that won t stop    Inability to keep down liquids    Frequent diarrhea    Weight loss or no weight gain over a 2-week period    Severe constant pain in the lower right abdomen    Fever of 100.4 F (38 C) or higher, or as directed by your provider    [phone] [employer]. [address] [place] [address]. All rights reserved. This information is not intended as a substitute for professional medical care. Always follow your healthcare professional's instructions.          24 Hour Appointment Hotline       To make an appointment at any [facility], call [phone] ([phone]). If you don't have a family doctor or clinic, we will help you find one. [facility] clinics are conveniently located to serve the needs of you and your family.             Review of your medicines      START taking        Dose / Directions Last dose taken    ondansetron 4 MG ODT tab   Commonly known as:  ZOFRAN ODT   Dose:  4 mg   Quantity:  10 tablet        Take 1 tablet (4 mg) by mouth every 8 hours as needed for nausea   Refills:  0          Our records show that you are taking the medicines listed below. If these are incorrect, please call your family doctor or clinic.        Dose / Directions Last dose taken    albuterol 108 (90 BASE) MCG/ACT Inhaler   Commonly known as:  PROAIR HFA/PROVENTIL HFA/VENTOLIN HFA   Dose:  2 puff   Quantity:  1 Inhaler        Inhale 2 puffs into  "the lungs every 6 hours as needed for shortness of breath / dyspnea or wheezing   Refills:  0        benzonatate 100 MG capsule   Commonly known as:  TESSALON   Dose:  100 mg   Quantity:  42 capsule        Take 1 capsule (100 mg) by mouth 3 times daily as needed for cough   Refills:  0        guaiFENesin-codeine 100-10 MG/5ML Soln solution   Commonly known as:  ROBITUSSIN AC   Dose:  2 tsp.   Quantity:  120 mL        Take 10 mLs by mouth every 4 hours as needed for cough   Refills:  0                Prescriptions were sent or printed at these locations (1 Prescription)                   Other Prescriptions                Printed at Department/Unit printer (1 of 1)         ondansetron (ZOFRAN ODT) 4 MG ODT tab                Procedures and tests performed during your visit     CBC with platelets differential    Comprehensive metabolic panel    HCG quantitative pregnancy    UA with Microscopic    hCG qual urine POCT      Orders Needing Specimen Collection     None      Pending Results     No orders found for last 3 day(s).            Pending Culture Results     No orders found for last 3 day(s).            Thank you for choosing [facility]       Thank you for choosing [facility] for your care. Our goal is always to provide you with excellent care. Hearing back from our patients is one way we can continue to improve our services. Please take a few minutes to complete the written survey that you may receive in the mail after you visit with us. Thank you!        [employer] Information     [employer] lets you send messages to your doctor, view your test results, renew your prescriptions, schedule appointments and more. To sign up, go to [email or web address] . Click on \"Log in\" on the left side of the screen, which will take you to the Welcome page. Then click on \"Sign up Now\" on the right side of the page.     You will be asked to enter the access code listed below, as well as some personal information. Please follow the " directions to create your username and password.     Your access code is: [email or web address]  Expires: 2017  2:23 PM     Your access code will  in 90 days. If you need help or a new code, please call your [facility] clinic or [phone].        Care EveryWhere ID     This is your Care EveryWhere ID. This could be used by other organizations to access your [facility] medical records  [id number]        After Visit Summary       This is your record. Keep this with you and show to your community pharmacist(s) and doctor(s) at your next visit.

## 2017-03-20 VITALS
RESPIRATION RATE: 16 BRPM | SYSTOLIC BLOOD PRESSURE: 98 MMHG | OXYGEN SATURATION: 97 % | TEMPERATURE: 98.6 F | HEART RATE: 77 BPM | DIASTOLIC BLOOD PRESSURE: 55 MMHG

## 2017-03-20 PROCEDURE — 25000128 H RX IP 250 OP 636: Performed by: EMERGENCY MEDICINE

## 2017-03-20 PROCEDURE — 96375 TX/PRO/DX INJ NEW DRUG ADDON: CPT | Performed by: EMERGENCY MEDICINE

## 2017-03-20 PROCEDURE — 25000125 ZZHC RX 250: Performed by: EMERGENCY MEDICINE

## 2017-03-20 RX ORDER — ONDANSETRON 4 MG/1
4 TABLET, ORALLY DISINTEGRATING ORAL EVERY 8 HOURS PRN
Qty: 10 TABLET | Refills: 0 | Status: SHIPPED | OUTPATIENT
Start: 2017-03-20 | End: 2017-03-23

## 2017-03-20 RX ADMIN — PROMETHAZINE HYDROCHLORIDE 25 MG: 25 INJECTION INTRAMUSCULAR; INTRAVENOUS at 00:05

## 2017-03-20 RX ADMIN — DEXTROSE AND SODIUM CHLORIDE 1000 ML: 5; 900 INJECTION, SOLUTION INTRAVENOUS at 01:21

## 2017-03-20 NOTE — ED PROVIDER NOTES
History     Chief Complaint   Patient presents with     Nausea & Vomiting     cannot hold nothing down, feeling hot and cold, feeling lightheaded two days ago, getting worse. Had five emesis today.     MIKAEL Gr is a 31 year old female  who is currently eight weeks pregnant who presents for evaluation of nausea and vomiting. Patient complains of continuous nausea and vomiting for the past two days. She has noticed a small amount of blood in her vomit. She denies fever or diarrhea. She has been unable to keep any PO or fluid intake down, and does complain of abdominal pain and a headache. She has not eaten any foods that could have caused her symptoms. She denies recent excessive alcohol intake. She denies vaginal bleeding. She has not been able to take any medications for her symptoms. No history of similar symptoms with prior pregnancies. Her last pregnancy was four years ago.     I have reviewed the Medications, Allergies, Past Medical and Surgical History, and Social History in the [facility] system.  History reviewed. No pertinent past medical history.    Past Surgical History   Procedure Laterality Date      section         No family history on file.    Social History   Substance Use Topics     Smoking status: Current Every Day Smoker     Packs/day: 0.25     Smokeless tobacco: Not on file     Alcohol use No     No current facility-administered medications for this encounter.      Current Outpatient Prescriptions   Medication     benzonatate (TESSALON) 100 MG capsule     albuterol (PROAIR HFA, PROVENTIL HFA, VENTOLIN HFA) 108 (90 BASE) MCG/ACT inhaler     guaiFENesin-codeine (ROBITUSSIN AC) 100-10 MG/5ML SOLN        Allergies   Allergen Reactions     Penicillins        Review of Systems   Constitutional: Positive for appetite change (decreased PO/fluid intake). Negative for fever.   Gastrointestinal: Positive for abdominal pain, nausea and vomiting. Negative for diarrhea.   Genitourinary:  Negative for vaginal bleeding.   Neurological: Positive for headaches.   All other systems reviewed and are negative.      Physical Exam   BP: 104/69  Pulse: 59  Temp: 98.5  F (36.9  C)  Resp: 18  SpO2: 100 %  Physical Exam   Constitutional: She is oriented to person, place, and time. She appears well-developed and well-nourished.   HENT:   Head: Normocephalic and atraumatic.   Neck: Normal range of motion.   Cardiovascular: Normal rate, regular rhythm and normal heart sounds.    Pulmonary/Chest: Effort normal and breath sounds normal. No respiratory distress. She has no wheezes. She has no rales.   Abdominal: Soft. She exhibits no distension. There is no tenderness. There is no rebound.   Musculoskeletal: She exhibits no tenderness.   Neurological: She is alert and oriented to person, place, and time.   Skin: Skin is warm and dry.   Psychiatric: She has a normal mood and affect. Her behavior is normal. Thought content normal.       ED Course   10:18 PM  The patient was seen and examined by Dr. An in Room 7.     ED Course     Procedures             Critical Care time:  none     Results for orders placed or performed during the hospital encounter of 03/19/17   CBC with platelets differential   Result Value Ref Range    WBC 12.5 (H) 4.0 - 11.0 10e9/L    RBC Count 3.76 (L) 3.8 - 5.2 10e12/L    Hemoglobin 11.9 11.7 - 15.7 g/dL    Hematocrit 34.6 (L) 35.0 - 47.0 %    MCV 92 78 - 100 fl    MCH 31.6 26.5 - 33.0 pg    MCHC 34.4 31.5 - 36.5 g/dL    RDW 13.2 10.0 - 15.0 %    Platelet Count 275 150 - 450 10e9/L    Diff Method Automated Method     % Neutrophils 60.8 %    % Lymphocytes 32.5 %    % Monocytes 4.6 %    % Eosinophils 1.4 %    % Basophils 0.4 %    % Immature Granulocytes 0.3 %    Nucleated RBCs 0 0 /100    Absolute Neutrophil 7.6 1.6 - 8.3 10e9/L    Absolute Lymphocytes 4.1 0.8 - 5.3 10e9/L    Absolute Monocytes 0.6 0.0 - 1.3 10e9/L    Absolute Eosinophils 0.2 0.0 - 0.7 10e9/L    Absolute Basophils 0.1 0.0 - 0.2  10e9/L    Abs Immature Granulocytes 0.0 0 - 0.4 10e9/L    Absolute Nucleated RBC 0.0    Comprehensive metabolic panel   Result Value Ref Range    Sodium 141 133 - 144 mmol/L    Potassium 3.6 3.4 - 5.3 mmol/L    Chloride 109 94 - 109 mmol/L    Carbon Dioxide 23 20 - 32 mmol/L    Anion Gap 9 3 - 14 mmol/L    Glucose 79 70 - 99 mg/dL    Urea Nitrogen 12 7 - 30 mg/dL    Creatinine 0.70 0.52 - 1.04 mg/dL    GFR Estimate >90  Non  GFR Calc   >60 mL/min/1.7m2    GFR Estimate If Black >90   GFR Calc   >60 mL/min/1.7m2    Calcium 8.9 8.5 - 10.1 mg/dL    Bilirubin Total 1.2 0.2 - 1.3 mg/dL    Albumin 4.0 3.4 - 5.0 g/dL    Protein Total 7.6 6.8 - 8.8 g/dL    Alkaline Phosphatase 56 40 - 150 U/L    ALT 35 0 - 50 U/L    AST 18 0 - 45 U/L   HCG quantitative pregnancy   Result Value Ref Range    HCG Quantitative Serum 23014 (H) 0 - 5 IU/L   UA with Microscopic   Result Value Ref Range    Color Urine Yellow     Appearance Urine Slightly Cloudy     Glucose Urine Negative NEG mg/dL    Bilirubin Urine Negative NEG    Ketones Urine 40 (A) NEG mg/dL    Specific Gravity Urine 1.022 1.003 - 1.035    Blood Urine Negative NEG    pH Urine 5.5 5.0 - 7.0 pH    Protein Albumin Urine 10 (A) NEG mg/dL    Urobilinogen mg/dL Normal 0.0 - 2.0 mg/dL    Nitrite Urine Negative NEG    Leukocyte Esterase Urine Negative NEG    Source Midstream Urine     WBC Urine 4 (H) 0 - 2 /HPF    RBC Urine 1 0 - 2 /HPF    Bacteria Urine Few (A) NEG /HPF    Squamous Epithelial /HPF Urine 9 (H) 0 - 1 /HPF    Mucous Urine Present (A) NEG /LPF   hCG qual urine POCT   Result Value Ref Range    HCG Qual Urine Positive neg    Internal QC OK Yes      Medications   0.9% sodium chloride BOLUS (0 mLs Intravenous Stopped 3/19/17 2338)     Followed by   0.9% sodium chloride BOLUS (1,000 mLs Intravenous New Bag 3/19/17 2338)     Followed by   0.9% sodium chloride infusion (not administered)   ondansetron (ZOFRAN) injection 4 mg (4 mg Intravenous Given  3/19/17 5364)   acetaminophen (TYLENOL) tablet 975 mg (not administered)   promethazine (PHENERGAN) IV injection 25 mg (25 mg Intravenous Given 3/20/17 0005)                 Labs Ordered and Resulted from Time of ED Arrival Up to the Time of Departure from the ED - No data to display    Assessments & Plan (with Medical Decision Making) Pt is a 8 weeks pregnant female who presents to the ER for ongoing hyperemesis. Pt with no vaginal bleeding or pelvic pain. Pt received IVF and nausea medications and is feeling somewhat better.  Will try oral challenge and will d/c home once able to keep liquids down.          I have reviewed the nursing notes.    I have reviewed the findings, diagnosis, plan and need for follow up with the patient.    New Prescriptions    No medications on file       Final diagnoses:   Hyperemesis gravidarum   Lesly BLISS, am serving as a trained medical scribe to document services personally performed by Maricel An MD, based on the provider's statements to me.   Maricel BLISS MD, was physically present and have reviewed and verified the accuracy of this note documented by Lesly Jaramillo.      3/19/2017   [facility], [place], EMERGENCY DEPARTMENT     Maricel An MD  03/20/17 0134

## 2017-03-20 NOTE — ED NOTES
Patient presents with generalized body ache, nausea, vomiting, headache, lightheadedness, heartburn and lower abdominal pain. Patient states symptoms started two days ago and is getting worst.

## 2017-03-20 NOTE — DISCHARGE INSTRUCTIONS
Please follow up with your Ob/GYN for further care.       Hyperemesis Gravidarum  Hyperemesis gravidarum is a severe form of  morning sickness  that can affect some women during pregnancy. It may develop around the 5th week and last until the 16th week of pregnancy. In some women, it may last longer. Symptoms include severe nausea and vomiting. This can lead to problems such as as weight loss and dehydration.  It is not clear what causes hyperemesis gravidarum. It may be due to rising hormone levels early in the pregnancy. It can be a serious threat to mother and fetus, if symptoms are severe. Therefore, follow the advice below carefully. If symptoms are not controlled by home measures, a hospital stay may be needed. IV (intravenous) fluids and medicines may be given.  Home care    Diet    Keep a log of the foods you eat and how they affect your symptoms. Avoid foods that trigger your symptoms.    Eat frequent small meals throughout the day rather than three large meals. This can help keep the stomach from being empty, which can make nausea worse.    Choose foods that are high in carbohydrates. Eating foods high in protein may also help. Limit greasy or spicy foods.    Before getting out of bed in the morning, try eating crackers or dry toast. This may help settle your stomach.    Drink cold, clear liquids. Drinking small amounts of liquids with electrolytes, such as sports drinks may help as well.    Medicine    If needed, your healthcare provider may prescribe certain medicines to help relieve nausea and vomiting. Vitamin B6 and deya may also be advised. Don t try any over-the-counter medicines or home remedies without talking to your provider first.  Follow-up care  Follow up with your healthcare provider, or as advised.  When to seek medical advice  Call your healthcare provider right away if any of these occur:    Signs of dehydration (dry mouth, extreme thirst, dark urine or little urine output,  dizziness, weakness, or fainting)    Vomiting that won t stop    Inability to keep down liquids    Frequent diarrhea    Weight loss or no weight gain over a 2-week period    Severe constant pain in the lower right abdomen    Fever of 100.4 F (38 C) or higher, or as directed by your provider    9181-0464 [employer]. [address] [place] [address]. All rights reserved. This information is not intended as a substitute for professional medical care. Always follow your healthcare professional's instructions.

## 2017-06-24 ENCOUNTER — HOSPITAL ENCOUNTER (EMERGENCY)
Facility: CLINIC | Age: 32
Discharge: HOME | End: 2017-06-25
Attending: FAMILY MEDICINE | Admitting: FAMILY MEDICINE
Payer: COMMERCIAL

## 2017-06-24 DIAGNOSIS — N10 ACUTE PYELONEPHRITIS: ICD-10-CM

## 2017-06-24 LAB
ALBUMIN UR-MCNC: 100 MG/DL
APPEARANCE UR: ABNORMAL
BACTERIA #/AREA URNS HPF: ABNORMAL /HPF
BASOPHILS # BLD AUTO: 0.1 10E9/L (ref 0–0.2)
BASOPHILS NFR BLD AUTO: 0.3 %
BILIRUB UR QL STRIP: NEGATIVE
COLOR UR AUTO: YELLOW
DIFFERENTIAL METHOD BLD: ABNORMAL
EOSINOPHIL # BLD AUTO: 0.2 10E9/L (ref 0–0.7)
EOSINOPHIL NFR BLD AUTO: 1.4 %
ERYTHROCYTE [DISTWIDTH] IN BLOOD BY AUTOMATED COUNT: 12.9 % (ref 10–15)
GLUCOSE UR STRIP-MCNC: NEGATIVE MG/DL
HCG UR QL: NEGATIVE
HCT VFR BLD AUTO: 36.6 % (ref 35–47)
HGB BLD-MCNC: 12.4 G/DL (ref 11.7–15.7)
HGB UR QL STRIP: ABNORMAL
IMM GRANULOCYTES # BLD: 0 10E9/L (ref 0–0.4)
IMM GRANULOCYTES NFR BLD: 0.3 %
INTERNAL QC OK POCT: YES
KETONES UR STRIP-MCNC: NEGATIVE MG/DL
LEUKOCYTE ESTERASE UR QL STRIP: ABNORMAL
LYMPHOCYTES # BLD AUTO: 3.2 10E9/L (ref 0.8–5.3)
LYMPHOCYTES NFR BLD AUTO: 21.6 %
MCH RBC QN AUTO: 31.6 PG (ref 26.5–33)
MCHC RBC AUTO-ENTMCNC: 33.9 G/DL (ref 31.5–36.5)
MCV RBC AUTO: 93 FL (ref 78–100)
MONOCYTES # BLD AUTO: 0.6 10E9/L (ref 0–1.3)
MONOCYTES NFR BLD AUTO: 4.2 %
MUCOUS THREADS #/AREA URNS LPF: PRESENT /LPF
NEUTROPHILS # BLD AUTO: 10.7 10E9/L (ref 1.6–8.3)
NEUTROPHILS NFR BLD AUTO: 72.2 %
NITRATE UR QL: POSITIVE
NRBC # BLD AUTO: 0 10*3/UL
NRBC BLD AUTO-RTO: 0 /100
PH UR STRIP: 5.5 PH (ref 5–7)
PLATELET # BLD AUTO: 231 10E9/L (ref 150–450)
RBC # BLD AUTO: 3.93 10E12/L (ref 3.8–5.2)
RBC #/AREA URNS AUTO: 36 /HPF (ref 0–2)
SP GR UR STRIP: 1.01 (ref 1–1.03)
SQUAMOUS #/AREA URNS AUTO: 4 /HPF (ref 0–1)
URN SPEC COLLECT METH UR: ABNORMAL
UROBILINOGEN UR STRIP-MCNC: NORMAL MG/DL (ref 0–2)
WBC # BLD AUTO: 14.9 10E9/L (ref 4–11)
WBC #/AREA URNS AUTO: >182 /HPF (ref 0–2)
WBC CLUMPS #/AREA URNS HPF: PRESENT /HPF

## 2017-06-24 PROCEDURE — 85025 COMPLETE CBC W/AUTO DIFF WBC: CPT | Performed by: FAMILY MEDICINE

## 2017-06-24 PROCEDURE — 99284 EMERGENCY DEPT VISIT MOD MDM: CPT | Mod: 25 | Performed by: FAMILY MEDICINE

## 2017-06-24 PROCEDURE — 87077 CULTURE AEROBIC IDENTIFY: CPT | Performed by: FAMILY MEDICINE

## 2017-06-24 PROCEDURE — 81001 URINALYSIS AUTO W/SCOPE: CPT | Performed by: FAMILY MEDICINE

## 2017-06-24 PROCEDURE — 81025 URINE PREGNANCY TEST: CPT | Performed by: FAMILY MEDICINE

## 2017-06-24 PROCEDURE — 87086 URINE CULTURE/COLONY COUNT: CPT | Performed by: FAMILY MEDICINE

## 2017-06-24 PROCEDURE — 99284 EMERGENCY DEPT VISIT MOD MDM: CPT | Mod: Z6 | Performed by: FAMILY MEDICINE

## 2017-06-24 PROCEDURE — 25000132 ZZH RX MED GY IP 250 OP 250 PS 637: Performed by: FAMILY MEDICINE

## 2017-06-24 PROCEDURE — 87186 SC STD MICRODIL/AGAR DIL: CPT | Performed by: FAMILY MEDICINE

## 2017-06-24 PROCEDURE — 96372 THER/PROPH/DIAG INJ SC/IM: CPT | Performed by: FAMILY MEDICINE

## 2017-06-24 RX ORDER — ACETAMINOPHEN 325 MG/1
650 TABLET ORAL ONCE
Status: COMPLETED | OUTPATIENT
Start: 2017-06-24 | End: 2017-06-24

## 2017-06-24 RX ORDER — CIPROFLOXACIN 500 MG/1
500 TABLET, FILM COATED ORAL ONCE
Status: COMPLETED | OUTPATIENT
Start: 2017-06-24 | End: 2017-06-24

## 2017-06-24 RX ORDER — IBUPROFEN 200 MG
400 TABLET ORAL ONCE
Status: COMPLETED | OUTPATIENT
Start: 2017-06-24 | End: 2017-06-24

## 2017-06-24 RX ADMIN — ACETAMINOPHEN 650 MG: 325 TABLET, FILM COATED ORAL at 23:45

## 2017-06-24 RX ADMIN — CIPROFLOXACIN HYDROCHLORIDE 500 MG: 500 TABLET, FILM COATED ORAL at 23:29

## 2017-06-24 RX ADMIN — IBUPROFEN 400 MG: 200 TABLET, FILM COATED ORAL at 23:45

## 2017-06-24 ASSESSMENT — ENCOUNTER SYMPTOMS
ABDOMINAL PAIN: 1
FEVER: 0
VOMITING: 0
NAUSEA: 0
SHORTNESS OF BREATH: 0
BACK PAIN: 1

## 2017-06-24 NOTE — ED AVS SNAPSHOT
[facility], [place], Emergency Department    [address]    [facility] [address]    Phone:  [phone]    Fax:  [phone]                                       Akua Gr   MRN: [id number]    Department:  [facility], Emergency Department   Date of Visit:  6/24/2017           After Visit Summary Signature Page     I have received my discharge instructions, and my questions have been answered. I have discussed any challenges I see with this plan with the nurse or doctor.    ..........................................................................................................................................  Patient/Patient Representative Signature      ..........................................................................................................................................  Patient Representative Print Name and Relationship to Patient    ..................................................               ................................................  Date                                            Time    ..........................................................................................................................................  Reviewed by Signature/Title    ...................................................              ..............................................  Date                                                            Time

## 2017-06-24 NOTE — ED AVS SNAPSHOT
[facility], Emergency Department    [address]    [place] [address]    Phone:  [phone]    Fax:  [phone]                                       Akua Gr   MRN: [id number]    Department:  [facility], Emergency Department   Date of Visit:  6/24/2017           Patient Information     Date Of Birth          1985        Your diagnoses for this visit were:     Acute pyelonephritis, right        You were seen by Haresh Yañez MD.        Discharge Instructions       cipro 500 mg twice a day. You got a dose in the emergency room. Your next dose at 10 am Sunday. Do not miss doses  zofran if needed for nausea  For the pain. 2 advil every 4 hours, if needing more pain control- one 325 mg tylenol and one percocet every 4 to 6 hours  You need to be re checked at your clinic in 2 to 3 days to make certain the cipro is working. This is very very important  You need to return to the emergency room immediately if fevers or chills > 101. Return immediately if severe nausea and inability to keep down liquids.  Return immediately if severe pain that cannot be managed at home    24 Hour Appointment Hotline       To make an appointment at any [facility], call [phone] ([phone]). If you don't have a family doctor or clinic, we will help you find one. [facility] are conveniently located to serve the needs of you and your family.             Review of your medicines      START taking        Dose / Directions Last dose taken    ciprofloxacin 500 MG tablet   Commonly known as:  CIPRO   Dose:  500 mg   Quantity:  14 tablet        Take 1 tablet (500 mg) by mouth 2 times daily for 7 days   Refills:  0        ibuprofen 200 MG tablet   Commonly known as:  ADVIL/MOTRIN   Dose:  400 mg   Quantity:  40 tablet        Take 2 tablets (400 mg) by mouth every 4 hours as needed for mild pain   Refills:  0        ondansetron 4 MG tablet   Commonly known as:  ZOFRAN   Dose:  4 mg   Quantity:  6  tablet        Take 1 tablet (4 mg) by mouth every 6 hours as needed for nausea   Refills:  0        oxyCODONE-acetaminophen 5-325 MG per tablet   Commonly known as:  PERCOCET   Quantity:  10 tablet        Take one tablet with one 325 mg tylenol every 6 hours   Refills:  0          Our records show that you are taking the medicines listed below. If these are incorrect, please call your family doctor or clinic.        Dose / Directions Last dose taken    albuterol 108 (90 BASE) MCG/ACT Inhaler   Commonly known as:  PROAIR HFA/PROVENTIL HFA/VENTOLIN HFA   Dose:  2 puff   Quantity:  1 Inhaler        Inhale 2 puffs into the lungs every 6 hours as needed for shortness of breath / dyspnea or wheezing   Refills:  0        benzonatate 100 MG capsule   Commonly known as:  TESSALON   Dose:  100 mg   Quantity:  42 capsule        Take 1 capsule (100 mg) by mouth 3 times daily as needed for cough   Refills:  0        guaiFENesin-codeine 100-10 MG/5ML Soln solution   Commonly known as:  ROBITUSSIN AC   Dose:  2 tsp.   Quantity:  120 mL        Take 10 mLs by mouth every 4 hours as needed for cough   Refills:  0                Prescriptions were sent or printed at these locations (4 Prescriptions)                   Other Prescriptions                Printed at Department/Unit printer (4 of 4)         ciprofloxacin (CIPRO) 500 MG tablet               ondansetron (ZOFRAN) 4 MG tablet               oxyCODONE-acetaminophen (PERCOCET) 5-325 MG per tablet               ibuprofen (ADVIL/MOTRIN) 200 MG tablet                Procedures and tests performed during your visit     CBC with platelets differential    UA with Microscopic    Urine Culture    hCG qual urine POCT      Orders Needing Specimen Collection     None      Pending Results     Date and Time Order Name Status Description    6/25/2017 0008 Urine Culture In process             Pending Culture Results     Date and Time Order Name Status Description    6/25/2017 0008 Urine Culture  "In process             Pending Results Instructions     If you had any lab results that were not finalized at the time of your Discharge, you can call the ED Lab Result RN at [phone]. You will be contacted by this team for any positive Lab results or changes in treatment. The nurses are available 7 days a week from 10A to 6:30P.  You can leave a message 24 hours per day and they will return your call.        Thank you for choosing [facility]       Thank you for choosing [facility] for your care. Our goal is always to provide you with excellent care. Hearing back from our patients is one way we can continue to improve our services. Please take a few minutes to complete the written survey that you may receive in the mail after you visit with us. Thank you!        [employer] Information     [employer] lets you send messages to your doctor, view your test results, renew your prescriptions, schedule appointments and more. To sign up, go to [email or web address] . Click on \"Log in\" on the left side of the screen, which will take you to the Welcome page. Then click on \"Sign up Now\" on the right side of the page.     You will be asked to enter the access code listed below, as well as some personal information. Please follow the directions to create your username and password.     Your access code is: [email or web address]  Expires: 2017 12:50 AM     Your access code will  in 90 days. If you need help or a new code, please call your [facility] clinic or [phone].        Care EveryWhere ID     This is your Care EveryWhere ID. This could be used by other organizations to access your [facility] medical records  [id number]        Equal Access to Services     [facility] : Hadleobardo Ware, daniel jaquez, av gage. So [facility] [phone].    ATENCIÓN: Si habla español, tiene a lopez disposición servicios gratuitos de asistencia lingüística. Llame al " [phone].    We comply with applicable federal civil rights laws and [place] laws. We do not discriminate on the basis of race, color, national origin, age, disability sex, sexual orientation or gender identity.            After Visit Summary       This is your record. Keep this with you and show to your community pharmacist(s) and doctor(s) at your next visit.

## 2017-06-25 VITALS
DIASTOLIC BLOOD PRESSURE: 76 MMHG | OXYGEN SATURATION: 96 % | WEIGHT: 130 LBS | RESPIRATION RATE: 14 BRPM | HEART RATE: 57 BPM | TEMPERATURE: 98.1 F | SYSTOLIC BLOOD PRESSURE: 109 MMHG

## 2017-06-25 PROCEDURE — 25000125 ZZHC RX 250: Performed by: FAMILY MEDICINE

## 2017-06-25 PROCEDURE — 25000128 H RX IP 250 OP 636: Performed by: FAMILY MEDICINE

## 2017-06-25 RX ORDER — ONDANSETRON 4 MG/1
4 TABLET, FILM COATED ORAL EVERY 6 HOURS PRN
Qty: 6 TABLET | Refills: 0 | Status: SHIPPED | OUTPATIENT
Start: 2017-06-25 | End: 2018-08-12

## 2017-06-25 RX ORDER — CIPROFLOXACIN 500 MG/1
500 TABLET, FILM COATED ORAL 2 TIMES DAILY
Qty: 14 TABLET | Refills: 0 | Status: SHIPPED | OUTPATIENT
Start: 2017-06-25 | End: 2017-07-02

## 2017-06-25 RX ORDER — IBUPROFEN 200 MG
400 TABLET ORAL EVERY 4 HOURS PRN
Qty: 40 TABLET | Refills: 0 | Status: SHIPPED | OUTPATIENT
Start: 2017-06-25 | End: 2018-08-12

## 2017-06-25 RX ORDER — HYDROMORPHONE HYDROCHLORIDE 1 MG/ML
0.5 INJECTION, SOLUTION INTRAMUSCULAR; INTRAVENOUS; SUBCUTANEOUS ONCE
Status: COMPLETED | OUTPATIENT
Start: 2017-06-25 | End: 2017-06-25

## 2017-06-25 RX ORDER — OXYCODONE AND ACETAMINOPHEN 5; 325 MG/1; MG/1
TABLET ORAL
Qty: 10 TABLET | Refills: 0 | Status: SHIPPED | OUTPATIENT
Start: 2017-06-25 | End: 2018-05-23

## 2017-06-25 RX ORDER — HYDROXYZINE HYDROCHLORIDE 50 MG/ML
50 INJECTION, SOLUTION INTRAMUSCULAR ONCE
Status: COMPLETED | OUTPATIENT
Start: 2017-06-25 | End: 2017-06-25

## 2017-06-25 RX ADMIN — HYDROMORPHONE HYDROCHLORIDE 0.5 MG: 1 INJECTION, SOLUTION INTRAMUSCULAR; INTRAVENOUS; SUBCUTANEOUS at 00:30

## 2017-06-25 RX ADMIN — HYDROXYZINE HYDROCHLORIDE 50 MG: 50 INJECTION, SOLUTION INTRAMUSCULAR at 00:30

## 2017-06-25 ASSESSMENT — ENCOUNTER SYMPTOMS
NEUROLOGICAL NEGATIVE: 1
COUGH: 0
MYALGIAS: 1
BLOOD IN STOOL: 0
CHILLS: 1
FATIGUE: 0
CONSTIPATION: 0
DIARRHEA: 0
HEMATOLOGIC/LYMPHATIC NEGATIVE: 1

## 2017-06-25 NOTE — ED PROVIDER NOTES
"  History     Chief Complaint   Patient presents with     Abdominal Pain     pt has lower abd and back pain. She believes that it is a UTI     HPI  Akua Gr is a 32 year old female with a history of s/p  in distant past and D&C April (2 months ago) who presents to the Emergency Department with her  for evaluation of abdominal pain. For the past 2 days, the patient has been experiencing suprapubic pain that radiates around to her back- right > left. Her pain significantly worsened today, prompting her to come to the ED. She denies vaginal discharge, vaginal bleeding, fever, nausea, vomiting diarrhea or any other concerns or complaints at this time. She did have chills today. LMP about 3 weeks ago. First menses from  and . She has a hx of \"urinary tract infections\" in the past.    History reviewed. No pertinent past medical history.    Past Surgical History:   Procedure Laterality Date      SECTION         No family history on file.    Social History   Substance Use Topics     Smoking status: Current Every Day Smoker     Packs/day: 0.25     Smokeless tobacco: Not on file     Alcohol use No     The pt denies street drugs or alcohol, slight smoker  No current facility-administered medications for this encounter.      Current Outpatient Prescriptions   Medication     ciprofloxacin (CIPRO) 500 MG tablet     ondansetron (ZOFRAN) 4 MG tablet     oxyCODONE-acetaminophen (PERCOCET) 5-325 MG per tablet     ibuprofen (ADVIL/MOTRIN) 200 MG tablet     benzonatate (TESSALON) 100 MG capsule     albuterol (PROAIR HFA, PROVENTIL HFA, VENTOLIN HFA) 108 (90 BASE) MCG/ACT inhaler     guaiFENesin-codeine (ROBITUSSIN AC) 100-10 MG/5ML SOLN        Allergies   Allergen Reactions     Penicillins      I have reviewed the Medications, Allergies, Past Medical and Surgical History, and Social History in the Epic system.    Review of Systems   Constitutional: Positive for chills. Negative for fatigue and fever. " "  HENT: Negative for congestion.    Respiratory: Negative for cough and shortness of breath.    Cardiovascular: Negative for chest pain.   Gastrointestinal: Positive for abdominal pain. Negative for blood in stool, constipation, diarrhea, nausea and vomiting.   Genitourinary: Negative for vaginal bleeding and vaginal discharge.        Denies preg   Musculoskeletal: Positive for back pain and myalgias.   Allergic/Immunologic: Negative for immunocompromised state.   Neurological: Negative.    Hematological: Negative.    All other systems reviewed and are negative.      Physical Exam   BP: 117/63  Pulse: 75  Temp: 98.1  F (36.7  C)  Resp: 14  Weight: 59 kg (130 lb)  SpO2: 100 %  Physical Exam   Constitutional: She appears well-developed and well-nourished. She appears distressed.   Smells of marijuana   HENT:   Head: Normocephalic and atraumatic.   Eyes: Pupils are equal, round, and reactive to light.   Neck: Neck supple.   Cardiovascular: Normal rate, regular rhythm, normal heart sounds and intact distal pulses.    No murmur heard.  Pulmonary/Chest: Breath sounds normal. No respiratory distress.   Abdominal: Soft. She exhibits no mass. There is tenderness. There is no rebound and no guarding.   No hs ketty.  Supra pubic tenderness with marked tenderness over the right kidney.   There is marked CVA tenderness right.   Musculoskeletal: She exhibits no edema.   Neurological: She is alert.   Skin: Skin is warm and dry.   Nursing note and vitals reviewed.      ED Course     11:04 PM  The patient was seen and examined by Dr. Yañez in Room 5.     ED Course     Procedures        Clinically right pyelonephritis. The pt got one dose of cipro in the ed orally and retained.  She got 2 tylenol and motrin for the pain. She was complaining of \"severe\" right back pain.  She got a dose of vistaril and dilaudid. Excellent help.  The pt is not toxic. She does not need admission.  See dc instructions       Labs Ordered and Resulted from " Time of ED Arrival Up to the Time of Departure from the ED   ROUTINE UA WITH MICROSCOPIC - Abnormal; Notable for the following:        Result Value    Blood Urine Moderate (*)     Protein Albumin Urine 100 (*)     Nitrite Urine Positive (*)     Leukocyte Esterase Urine Large (*)     WBC Urine >182 (*)     RBC Urine 36 (*)     WBC Clumps Present (*)     Bacteria Urine Moderate (*)     Squamous Epithelial /HPF Urine 4 (*)     Mucous Urine Present (*)     All other components within normal limits   CBC WITH PLATELETS DIFFERENTIAL - Abnormal; Notable for the following:     WBC 14.9 (*)     Absolute Neutrophil 10.7 (*)     All other components within normal limits   HCG QUAL URINE POCT - Normal   URINE CULTURE AEROBIC BACTERIAL            Assessments & Plan (with Medical Decision Making)       I have reviewed the nursing notes.    I have reviewed the findings, diagnosis, plan and need for follow up with the patient.    Discharge Medication List as of 6/25/2017 12:50 AM      START taking these medications    Details   ciprofloxacin (CIPRO) 500 MG tablet Take 1 tablet (500 mg) by mouth 2 times daily for 7 days, Disp-14 tablet, R-0, Local Print      ondansetron (ZOFRAN) 4 MG tablet Take 1 tablet (4 mg) by mouth every 6 hours as needed for nausea, Disp-6 tablet, R-0, Local Print      oxyCODONE-acetaminophen (PERCOCET) 5-325 MG per tablet Take one tablet with one 325 mg tylenol every 6 hours, Disp-10 tablet, R-0, Local Print      ibuprofen (ADVIL/MOTRIN) 200 MG tablet Take 2 tablets (400 mg) by mouth every 4 hours as needed for mild pain, Disp-40 tablet, R-0, Local Print             Final diagnoses:   Acute pyelonephritis, right     IMonserrat, am serving as a trained medical scribe to document services personally performed by Haresh Yañez MD, based on the provider's statements to me.      IHaresh MD, was physically present and have reviewed and verified the accuracy of this note documented by Monserrat Melchor.      6/24/2017   [facility], [place], EMERGENCY DEPARTMENT     Haresh Yañez MD  06/25/17 0111

## 2017-06-25 NOTE — DISCHARGE INSTRUCTIONS
cipro 500 mg twice a day. You got a dose in the emergency room. Your next dose at 10 am Sunday. Do not miss doses  zofran if needed for nausea  For the pain. 2 advil every 4 hours, if needing more pain control- one 325 mg tylenol and one percocet every 4 to 6 hours  You need to be re checked at your clinic in 2 to 3 days to make certain the cipro is working. This is very very important  You need to return to the emergency room immediately if fevers or chills > 101. Return immediately if severe nausea and inability to keep down liquids.  Return immediately if severe pain that cannot be managed at home

## 2017-06-25 NOTE — ED NOTES
Pt states that she developed lower abd and low back pain today. She states that she believes that this is a UTI

## 2017-06-26 LAB
BACTERIA SPEC CULT: ABNORMAL
Lab: ABNORMAL
MICRO REPORT STATUS: ABNORMAL
MICROORGANISM SPEC CULT: ABNORMAL
SPECIMEN SOURCE: ABNORMAL

## 2017-11-22 ENCOUNTER — HOSPITAL ENCOUNTER (EMERGENCY)
Facility: CLINIC | Age: 32
Discharge: HOME | End: 2017-11-22
Attending: EMERGENCY MEDICINE | Admitting: EMERGENCY MEDICINE
Payer: COMMERCIAL

## 2017-11-22 VITALS
RESPIRATION RATE: 12 BRPM | WEIGHT: 125 LBS | HEART RATE: 77 BPM | OXYGEN SATURATION: 98 % | HEIGHT: 62 IN | SYSTOLIC BLOOD PRESSURE: 104 MMHG | TEMPERATURE: 97.6 F | DIASTOLIC BLOOD PRESSURE: 73 MMHG | BODY MASS INDEX: 23 KG/M2

## 2017-11-22 DIAGNOSIS — M21.611 BUNION, RIGHT: ICD-10-CM

## 2017-11-22 PROCEDURE — 99283 EMERGENCY DEPT VISIT LOW MDM: CPT

## 2017-11-22 PROCEDURE — 25000132 ZZH RX MED GY IP 250 OP 250 PS 637: Performed by: EMERGENCY MEDICINE

## 2017-11-22 RX ORDER — NAPROXEN 500 MG/1
500 TABLET ORAL 2 TIMES DAILY WITH MEALS
Qty: 16 TABLET | Refills: 0 | Status: SHIPPED | OUTPATIENT
Start: 2017-11-22 | End: 2017-11-30

## 2017-11-22 RX ADMIN — DEXAMETHASONE INTENSOL 10 MG: 1 SOLUTION, CONCENTRATE ORAL at 12:50

## 2017-11-22 NOTE — ED PROVIDER NOTES
"  History     Chief Complaint:  Foot Pain      HPI   Akua Gr is a 32 year old female who presents to the emergency department today for evaluation of foot pain. The patient reports that she has pain in her right foot and feels like she has \"bone coming out of my skin.\" She further states \"when I walk my feet are like pigeon toed.\" Therefore, she presents to the emergency department for evaluation. She states she has been taking tylenol for pain with some relief. The patient has no other concerns at this time.     Allergies:  Penicillins    Medications:    naproxen (NAPROSYN) 500 MG tablet  ondansetron (ZOFRAN) 4 MG tablet  oxyCODONE-acetaminophen (PERCOCET) 5-325 MG per tablet  ibuprofen (ADVIL/MOTRIN) 200 MG tablet  benzonatate (TESSALON) 100 MG capsule  albuterol (PROAIR HFA, PROVENTIL HFA, VENTOLIN HFA) 108 (90 BASE) MCG/ACT inhaler  guaiFENesin-codeine (ROBITUSSIN AC) 100-10 MG/5ML SOLN    Past Medical History:    History reviewed. No pertinent past medical history.    Past Surgical History:        Family History:    History reviewed. No pertinent family history.     Social History:  The patient was accompanied to the ED by family.  Smoking Status: Current  Alcohol Use: No  Marital Status:  Single     Review of Systems   Musculoskeletal:        Positive for right foot pain   All other systems reviewed and are negative.    Physical Exam   First Vitals:  BP: 104/73  Pulse: 77  Temp: 97.6  F (36.4  C)  Resp: 12  Height: 158.5 cm (5' 2.4\")  Weight: 56.7 kg (125 lb)  SpO2: 98 %    Physical Exam   Constitutional: She appears well-developed.   Cardiovascular: Normal rate.    Pulmonary/Chest: Effort normal.   Musculoskeletal:        Feet:    Nursing note and vitals reviewed.        Emergency Department Course     Interventions:  1250 Decadron 10 mg PO    Emergency Department Course:  Nursing notes and vitals reviewed.  1147: I performed an exam of the patient as documented above.   Findings and plan " explained to the Patient. Patient discharged home with instructions regarding supportive care, medications, and reasons to return. The importance of close follow-up was reviewed. The patient was prescribed Naprosyn.    Impression & Plan      Medical Decision Making:  Akua Gr is a 32 year old female who presents with toe pain. Examination shows a bunion on right foot that is inflamed. There is no clinical concern for gout. There is no clinical concern for trauma. Patient was recommended bunion care and follow up with pediatry.    Diagnosis:    ICD-10-CM    1. Bunion, right M21.611        Disposition:  discharged to home    Discharge Medications:  New Prescriptions    NAPROXEN (NAPROSYN) 500 MG TABLET    Take 1 tablet (500 mg) by mouth 2 times daily (with meals) for 8 days       Scribe Disclosure:  Yolanda BLISS, am serving as a scribe at 11:47 AM on 11/22/2017 to document services personally performed by William Draper MD based on my observations and the provider's statements to me.    11/22/2017    EMERGENCY DEPARTMENT       William Draper MD  11/23/17 6162

## 2017-11-22 NOTE — ED AVS SNAPSHOT
Emergency Department    [address] [place] [address]    Phone:  [phone]    Fax:  [phone]                                       Akua Gr   MRN: [id number]    Department:   Emergency Department   Date of Visit:  11/22/2017           After Visit Summary Signature Page     I have received my discharge instructions, and my questions have been answered. I have discussed any challenges I see with this plan with the nurse or doctor.    ..........................................................................................................................................  Patient/Patient Representative Signature      ..........................................................................................................................................  Patient Representative Print Name and Relationship to Patient    ..................................................               ................................................  Date                                            Time    ..........................................................................................................................................  Reviewed by Signature/Title    ...................................................              ..............................................  Date                                                            Time

## 2017-11-22 NOTE — ED AVS SNAPSHOT
Emergency Department    [address] [place] [address]    Phone:  [phone]    Fax:  [phone]                                       Akua Gr   MRN: [id number]    Department:   Emergency Department   Date of Visit:  11/22/2017           Patient Information     Date Of Birth          1985        Your diagnoses for this visit were:     Bunion, right        You were seen by William Draper MD.      Follow-up Information     Follow up with Podiatry Foot, Ankle Surgery In 1 week.    Specialty:  Podiatry    Why:  As needed        Discharge Instructions         What Are Bunions?  The bone at the base of your big toe connects to a bone in the ball of your foot. The joint is where the 2 bones connect. Normally, the 2 bones lie almost in a straight line, with your big toe pointing straight ahead. But sometimes the big toe starts to turn in towards the smaller toes. This pushes the joint out to the side, causing a bony bump called a bunion. Bunions can be mild, moderate, or severe.     A bunion  is a small bump on the side of the foot at the base of the big toe. It forms when the big toe turns in toward the second toe. This pushes the joint at the base of the big toe out to the inside.    Symptoms of bunions  A bunion often causes pain and swelling around the joint at the base of the big toe. The skin may become red or warm. If the big toe pushes under the second toe, a painful corn may form on the top of or inside the second toe. In some cases, bunions cause no symptoms other than making the foot harder to fit in a shoe.  What can be done    Wear comfortable shoes. Wearing shoes that are roomy across the toes and that have low heels (less than 2 inches) will help keep a bunion from getting worse or causing pain.    Ask your healthcare provider about pads and inserts to help prevent corns and to cushion the bunion.    Talk to your healthcare provider about bunion surgery and  "whether it is right for you.  Note  Your feet tend to get larger as you age. That means you may need to increase your shoe size from time to time to ensure that your shoes fit your feet comfortably.   Date Last Reviewed: 9/10/2015    1444-1730 [employer]. [address] [place] [address]. All rights reserved. This information is not intended as a substitute for professional medical care. Always follow your healthcare professional's instructions.          Treating Bunions  Although a bunion won t go away, wearing shoes that fit properly will often relieve the pain. Padding and icing the bunion may also help. Bunions that remain painful may need surgery.     Heels: Heel height should be low. The back of the shoe should  your heel firmly so the shoe doesn't flop when you walk.         Toes: There should be 1/2\" between your longest toe and the tip of the shoe. The shoe should be wide enough for you to wiggle your toes.    Shoes  To relieve a bunion, you don t have to buy shoes that are ugly or out of fashion. But follow these tips:    Shop for shoes late in the day. This is when your feet are the largest.    Have both feet measured often. Fit shoes to your larger foot.    Look for shoes that have the same shape as your foot but are slightly wider across the toes.    Choose low-heeled shoes.    Always try shoes on. Stand up and walk around. If the shoes aren t comfortable, don t buy them.  Ice massage  To help relieve a painful bunion, put an ice cube in a plastic bag. Rub the ice on the bunion for 5 minutes. Repeat 2 to 3 times a day.  Pads  You may want to put a pad over the bunion to cushion it. You can buy bunion pads at most drugstores.  Surgery  Wearing wider shoes and padding the bunion may not relieve the pain. Your healthcare provider may then suggest surgery. During surgery, the bunion is shaved away and the bones are put back in a straight line.   Date Last Reviewed: 9/27/2015    " 1590-4575 [employer]. [address] [place] [address]. All rights reserved. This information is not intended as a substitute for professional medical care. Always follow your healthcare professional's instructions.          24 Hour Appointment Hotline       To make an appointment at any [facility], call [phone] ([phone]). If you don't have a family doctor or clinic, we will help you find one. [facility] clinics are conveniently located to serve the needs of you and your family.             Review of your medicines      START taking        Dose / Directions Last dose taken    naproxen 500 MG tablet   Commonly known as:  NAPROSYN   Dose:  500 mg   Quantity:  16 tablet        Take 1 tablet (500 mg) by mouth 2 times daily (with meals) for 8 days   Refills:  0          Our records show that you are taking the medicines listed below. If these are incorrect, please call your family doctor or clinic.        Dose / Directions Last dose taken    albuterol 108 (90 BASE) MCG/ACT Inhaler   Commonly known as:  PROAIR HFA/PROVENTIL HFA/VENTOLIN HFA   Dose:  2 puff   Quantity:  1 Inhaler        Inhale 2 puffs into the lungs every 6 hours as needed for shortness of breath / dyspnea or wheezing   Refills:  0        benzonatate 100 MG capsule   Commonly known as:  TESSALON   Dose:  100 mg   Quantity:  42 capsule        Take 1 capsule (100 mg) by mouth 3 times daily as needed for cough   Refills:  0        guaiFENesin-codeine 100-10 MG/5ML Soln solution   Commonly known as:  ROBITUSSIN AC   Dose:  2 tsp.   Quantity:  120 mL        Take 10 mLs by mouth every 4 hours as needed for cough   Refills:  0        ibuprofen 200 MG tablet   Commonly known as:  ADVIL/MOTRIN   Dose:  400 mg   Quantity:  40 tablet        Take 2 tablets (400 mg) by mouth every 4 hours as needed for mild pain   Refills:  0        ondansetron 4 MG tablet   Commonly known as:  ZOFRAN   Dose:  4 mg   Quantity:  6 tablet         Take 1 tablet (4 mg) by mouth every 6 hours as needed for nausea   Refills:  0        oxyCODONE-acetaminophen 5-325 MG per tablet   Commonly known as:  PERCOCET   Quantity:  10 tablet        Take one tablet with one 325 mg tylenol every 6 hours   Refills:  0                Prescriptions were sent or printed at these locations (1 Prescription)                   Other Prescriptions                Printed at Department/Unit printer (1 of 1)         naproxen (NAPROSYN) 500 MG tablet                Orders Needing Specimen Collection     None      Pending Results     No orders found from 11/20/2017 to 11/23/2017.            Pending Culture Results     No orders found from 11/20/2017 to 11/23/2017.            Pending Results Instructions     If you had any lab results that were not finalized at the time of your Discharge, you can call the ED Lab Result RN at [phone]. You will be contacted by this team for any positive Lab results or changes in treatment. The nurses are available 7 days a week from 10A to 6:30P.  You can leave a message 24 hours per day and they will return your call.        Test Results From Your Hospital Stay               Clinical Quality Measure: Blood Pressure Screening     Your blood pressure was checked while you were in the emergency department today. The last reading we obtained was  BP: 104/73 . Please read the guidelines below about what these numbers mean and what you should do about them.  If your systolic blood pressure (the top number) is less than 120 and your diastolic blood pressure (the bottom number) is less than 80, then your blood pressure is normal. There is nothing more that you need to do about it.  If your systolic blood pressure (the top number) is 120-139 or your diastolic blood pressure (the bottom number) is 80-89, your blood pressure may be higher than it should be. You should have your blood pressure rechecked within a year by a primary care provider.  If your systolic  "blood pressure (the top number) is 140 or greater or your diastolic blood pressure (the bottom number) is 90 or greater, you may have high blood pressure. High blood pressure is treatable, but if left untreated over time it can put you at risk for heart attack, stroke, or kidney failure. You should have your blood pressure rechecked by a primary care provider within the next 4 weeks.  If your provider in the emergency department today gave you specific instructions to follow-up with your doctor or provider even sooner than that, you should follow that instruction and not wait for up to 4 weeks for your follow-up visit.        Thank you for choosing [facility]       Thank you for choosing [facility] for your care. Our goal is always to provide you with excellent care. Hearing back from our patients is one way we can continue to improve our services. Please take a few minutes to complete the written survey that you may receive in the mail after you visit with us. Thank you!        [facility] [employer] Information     [employer] lets you send messages to your doctor, view your test results, renew your prescriptions, schedule appointments and more. To sign up, go to [email or web address] . Click on \"Log in\" on the left side of the screen, which will take you to the Welcome page. Then click on \"Sign up Now\" on the right side of the page.     You will be asked to enter the access code listed below, as well as some personal information. Please follow the directions to create your username and password.     Your access code is: [email or web address]  Expires: 2018 11:52 AM     Your access code will  in 90 days. If you need help or a new code, please call your [facility] clinic or [phone].        Care EveryWhere ID     This is your Care EveryWhere ID. This could be used by other organizations to access your [facility] medical records  [id number]        Equal Access to Services     CASSANDRA MCNEAL : daniel Martin " rehan jaquez waxay idiin hayaan adeeg kharash la'aan ah. So [facility] [phone].    ATENCIÓN: Si habla doris, tiene a lopez disposición servicios gratuitos de asistencia lingüística. Llame al [phone].    We comply with applicable federal civil rights laws and [place] laws. We do not discriminate on the basis of race, color, national origin, age, disability, sex, sexual orientation, or gender identity.            After Visit Summary       This is your record. Keep this with you and show to your community pharmacist(s) and doctor(s) at your next visit.

## 2017-11-22 NOTE — DISCHARGE INSTRUCTIONS
What Are Bunions?  The bone at the base of your big toe connects to a bone in the ball of your foot. The joint is where the 2 bones connect. Normally, the 2 bones lie almost in a straight line, with your big toe pointing straight ahead. But sometimes the big toe starts to turn in towards the smaller toes. This pushes the joint out to the side, causing a bony bump called a bunion. Bunions can be mild, moderate, or severe.     A bunion  is a small bump on the side of the foot at the base of the big toe. It forms when the big toe turns in toward the second toe. This pushes the joint at the base of the big toe out to the inside.    Symptoms of bunions  A bunion often causes pain and swelling around the joint at the base of the big toe. The skin may become red or warm. If the big toe pushes under the second toe, a painful corn may form on the top of or inside the second toe. In some cases, bunions cause no symptoms other than making the foot harder to fit in a shoe.  What can be done    Wear comfortable shoes. Wearing shoes that are roomy across the toes and that have low heels (less than 2 inches) will help keep a bunion from getting worse or causing pain.    Ask your healthcare provider about pads and inserts to help prevent corns and to cushion the bunion.    Talk to your healthcare provider about bunion surgery and whether it is right for you.  Note  Your feet tend to get larger as you age. That means you may need to increase your shoe size from time to time to ensure that your shoes fit your feet comfortably.   Date Last Reviewed: 9/10/2015    2668-1599 [employer]. [address] [place] [address]. All rights reserved. This information is not intended as a substitute for professional medical care. Always follow your healthcare professional's instructions.          Treating Bunions  Although a bunion won t go away, wearing shoes that fit properly will often relieve the pain. Padding and  "icing the bunion may also help. Bunions that remain painful may need surgery.     Heels: Heel height should be low. The back of the shoe should  your heel firmly so the shoe doesn't flop when you walk.         Toes: There should be 1/2\" between your longest toe and the tip of the shoe. The shoe should be wide enough for you to wiggle your toes.    Shoes  To relieve a bunion, you don t have to buy shoes that are ugly or out of fashion. But follow these tips:    Shop for shoes late in the day. This is when your feet are the largest.    Have both feet measured often. Fit shoes to your larger foot.    Look for shoes that have the same shape as your foot but are slightly wider across the toes.    Choose low-heeled shoes.    Always try shoes on. Stand up and walk around. If the shoes aren t comfortable, don t buy them.  Ice massage  To help relieve a painful bunion, put an ice cube in a plastic bag. Rub the ice on the bunion for 5 minutes. Repeat 2 to 3 times a day.  Pads  You may want to put a pad over the bunion to cushion it. You can buy bunion pads at most drugstores.  Surgery  Wearing wider shoes and padding the bunion may not relieve the pain. Your healthcare provider may then suggest surgery. During surgery, the bunion is shaved away and the bones are put back in a straight line.   Date Last Reviewed: 9/27/2015 2000-2017 [employer]. [address] [place] [address], [place] [address]. All rights reserved. This information is not intended as a substitute for professional medical care. Always follow your healthcare professional's instructions.        "

## 2017-12-17 ENCOUNTER — HOSPITAL ENCOUNTER (EMERGENCY)
Facility: CLINIC | Age: 32
Discharge: HOME | End: 2017-12-17
Attending: EMERGENCY MEDICINE | Admitting: EMERGENCY MEDICINE
Payer: COMMERCIAL

## 2017-12-17 VITALS
RESPIRATION RATE: 16 BRPM | HEART RATE: 66 BPM | BODY MASS INDEX: 23 KG/M2 | OXYGEN SATURATION: 98 % | DIASTOLIC BLOOD PRESSURE: 71 MMHG | HEIGHT: 62 IN | WEIGHT: 125 LBS | TEMPERATURE: 98.3 F | SYSTOLIC BLOOD PRESSURE: 109 MMHG

## 2017-12-17 DIAGNOSIS — J03.90 TONSILLITIS: ICD-10-CM

## 2017-12-17 LAB
DEPRECATED S PYO AG THROAT QL EIA: NORMAL
SPECIMEN SOURCE: NORMAL

## 2017-12-17 PROCEDURE — 99283 EMERGENCY DEPT VISIT LOW MDM: CPT

## 2017-12-17 PROCEDURE — 87880 STREP A ASSAY W/OPTIC: CPT | Performed by: EMERGENCY MEDICINE

## 2017-12-17 PROCEDURE — 25000132 ZZH RX MED GY IP 250 OP 250 PS 637: Performed by: EMERGENCY MEDICINE

## 2017-12-17 PROCEDURE — 87081 CULTURE SCREEN ONLY: CPT | Performed by: EMERGENCY MEDICINE

## 2017-12-17 PROCEDURE — 25000131 ZZH RX MED GY IP 250 OP 636 PS 637: Performed by: EMERGENCY MEDICINE

## 2017-12-17 RX ORDER — CLINDAMYCIN HCL 300 MG
300 CAPSULE ORAL 4 TIMES DAILY
Qty: 28 CAPSULE | Refills: 0 | Status: SHIPPED | OUTPATIENT
Start: 2017-12-17 | End: 2017-12-24

## 2017-12-17 RX ORDER — HYDROCODONE BITARTRATE AND ACETAMINOPHEN 5; 325 MG/1; MG/1
1 TABLET ORAL EVERY 4 HOURS PRN
Qty: 10 TABLET | Refills: 0 | Status: SHIPPED | OUTPATIENT
Start: 2017-12-17 | End: 2018-05-23

## 2017-12-17 RX ORDER — IBUPROFEN 200 MG
400 TABLET ORAL ONCE
Status: COMPLETED | OUTPATIENT
Start: 2017-12-17 | End: 2017-12-17

## 2017-12-17 RX ADMIN — IBUPROFEN 400 MG: 200 TABLET, FILM COATED ORAL at 12:42

## 2017-12-17 RX ADMIN — DEXAMETHASONE 10 MG: 2 TABLET ORAL at 12:42

## 2017-12-17 ASSESSMENT — ENCOUNTER SYMPTOMS
SHORTNESS OF BREATH: 0
ABDOMINAL PAIN: 0
SORE THROAT: 1
VOMITING: 0
DIARRHEA: 0
NAUSEA: 0
FACIAL SWELLING: 1
COUGH: 0
RHINORRHEA: 0
FEVER: 0

## 2017-12-17 NOTE — ED NOTES
Patient in with complaints of generalized unwell feeling. Complains of body aches, headache sore throat, ear pain. States has been going on for the past couple of days. Denies fever.

## 2017-12-17 NOTE — ED PROVIDER NOTES
"  History     Chief Complaint:  Sore throat     HPI   Akua Gr is a 32 year old female who presents for evaluation of sore throat. The patient states she experienced sore throat starting 3 days ago which has been progressively worse with swelling neck glands and body aches. Here, the patient complains of continued symptoms but she denies fevers, cough, rhinorrhea, shortness of breath, nausea, vomiting, diarrhea, abdominal pain, or any additional symptoms.  She denies neck pain or stiffness and denies headache.    Allergies:  Penicillins      Medications:    Zofran   Percocet  Ibuprofen   Tessalon   Albuterol  Robitussin AC     Past Medical History:    The patient does not have any past pertinent medical history.    Past Surgical History:        Family History:    History reviewed. No pertinent family history.      Social History:  Smoking status: Everyday smoker  Alcohol use: Occasional use  Marital Status:  Single      Review of Systems   Constitutional: Negative for fever.   HENT: Positive for facial swelling and sore throat. Negative for rhinorrhea.    Respiratory: Negative for cough and shortness of breath.    Cardiovascular: Negative for chest pain.   Gastrointestinal: Negative for abdominal pain, diarrhea, nausea and vomiting.   All other systems reviewed and are negative.    Physical Exam   First Vitals:  BP: 109/71  Pulse: 66  Temp: 98.3  F (36.8  C)  Resp: 16  Height: 157.5 cm (5' 2\")  Weight: 56.7 kg (125 lb)  SpO2: 98 %    Physical Exam  Nursing note and vitals reviewed.  Constitutional:  Appears well-developed and well-nourished.   HENT:   Head:    Atraumatic.   Mouth/Throat:   Oropharynx is clear and moist. Erythema on bilateral tonsils with small exudate right greater than left. No peritonsil swelling or fullness.   Eyes:    Pupils are equal, round, and reactive to light.   Neck:    Normal range of motion. Neck supple.      No tracheal deviation present. No thyromegaly present. "   Cardiovascular:  Normal rate, regular rhythm, no murmur   Pulmonary/Chest: Breath sounds are clear and equal without wheezes or crackles.  Abdominal:   Soft. Bowel sounds are normal. Exhibits no distension and      no mass. There is no tenderness.      There is no rebound and no guarding.   Musculoskeletal:  Exhibits no edema.   Lymphadenopathy:  Tender swollen right anterior cervical lymphadenopathy  Neurological:   Alert and oriented to person, place, and time.   Skin:    Skin is warm and dry. No rash noted. No pallor.     Emergency Department Course   Laboratory:  Rapid Strep: Negative  Strep Culture: Pending    Interventions:  1242 Ibuprofen 400 mg PO   1242 Decadron 10 mg PO     Emergency Department Course:  Past medical records, nursing notes, and vitals reviewed.  1226: I performed an exam of the patient and obtained history, as documented above.   Intervention given as above.   1240: I rechecked the patient. Findings and plan explained to the Patient. Patient discharged home with instructions regarding supportive care, medications, and reasons to return. The importance of close follow-up was reviewed.        Impression & Plan    Medical Decision Making:  Akua Gr is a 32 year old female who presents with sore throat. I found her to have tonsillitis with a negative strep test. Since she has exudate on her tonsils, I felt that antibiotics were indicated. Because of her penicillin allergy, she was treated with Clindamycin. She woodruff not have any signs of peritonsillar abscess. She has anterior swollen lymph nodes but no posterior lymph swelling. I felt mono is unlikely. However, I told her if she is still ill in a week, she should be rechecked in a week in clinic to have a blood test to check for mono, but that would be false negative today as she has only been sick for 3 days. She does not have any signs of meningitis or deep space neck infection. I felt she could be safely discharged to home. She was  prescribe Vicodin with Clindamycin and to no drive a car or drink alcohol for 6 hours after taking it. She was given treatment recommendations for tonsillitis.     Diagnosis:    ICD-10-CM    1. Tonsillitis J03.90      Disposition:  discharged to home    Discharge Medications:  New Prescriptions    CLINDAMYCIN (CLEOCIN) 300 MG CAPSULE    Take 1 capsule (300 mg) by mouth 4 times daily for 7 days    HYDROCODONE-ACETAMINOPHEN (NORCO) 5-325 MG PER TABLET    Take 1 tablet by mouth every 4 hours as needed for moderate to severe pain No driving a car or drinking alcohol for 6 hours after taking this medication.     Calvin Sena  12/17/2017    EMERGENCY DEPARTMENT  I, Calvin Sena, am serving as a scribe at 12:26 PM on 12/17/2017 to document services personally performed by Melissa Early MD based on my observations and the provider's statements to me.       Melissa Early MD  12/17/17 0799

## 2017-12-17 NOTE — ED AVS SNAPSHOT
Emergency Department    [facility] [address]    Phone:  [phone]    Fax:  [phone]                                       Akua Gr   MRN: [id number]    Department:   Emergency Department   Date of Visit:  12/17/2017           After Visit Summary Signature Page     I have received my discharge instructions, and my questions have been answered. I have discussed any challenges I see with this plan with the nurse or doctor.    ..........................................................................................................................................  Patient/Patient Representative Signature      ..........................................................................................................................................  Patient Representative Print Name and Relationship to Patient    ..................................................               ................................................  Date                                            Time    ..........................................................................................................................................  Reviewed by Signature/Title    ...................................................              ..............................................  Date                                                            Time

## 2017-12-17 NOTE — ED AVS SNAPSHOT
Emergency Department    [address] [facility] [address]    Phone:  [phone]    Fax:  [phone]                                       Akua Gr   MRN: [id number]    Department:   Emergency Department   Date of Visit:  12/17/2017           Patient Information     Date Of Birth          1985        Your diagnoses for this visit were:     Tonsillitis        You were seen by Melissa Early MD.      Follow-up Information     Follow up with  Emergency Department.    Specialty:  EMERGENCY MEDICINE    Why:  As needed, If symptoms worsen    Contact information:    [address] [facility] [address] [phone]        Follow up with Clinic, [facility].    Specialty:  Clinic    Why:  in 1 week to be checked for Mononucleosis by blood test if you are not better.    Contact information:    [address] [address] [phone]          Discharge Instructions       Warm salt water garggles with 1 teaspoon of salt in an 8 ounce glass of water (garggle in spit it out).    Chloriseptic throat spray as needed for pain.    Take Advil/Ibuprofen 400 mg every 6 hours as needed for pain with food.    Opioid Medication Information    You have been given a prescription for an opioid (narcotic) pain medicine and/or have received a pain medicine while here in the Emergency Department. These medicines can make you drowsy or impaired. You must not drive, operate dangerous equipment, or engage in any other dangerous activities while taking these medications. If you drive while taking these medications, you could be arrested for DUI, or driving under the influence. Do not drink any alcohol while you are taking these medications.   Opioid pain medications can cause addiction. If you have a history of chemical dependency of any type, you are at a higher risk of becoming addicted to pain medications.  Only take these prescribed medications to treat your  pain when all other options have been tried. Take it for as short a time and as few doses as possible. Store your pain pills in a secure place, as they are frequently stolen and provide a dangerous opportunity for children or visitors in your house to start abusing these powerful medications. We will not replace any lost or stolen medicine.  As soon as your pain is better, you should flush all your remaining medication.   Many prescription pain medications contain Tylenol  (acetaminophen), including Vicodin , Tylenol #3 , Norco , Lortab , and Percocet .  You should not take any extra pills of Tylenol  if you are using these prescription medications or you can get very sick.  Do not ever take more than 4000 mg of acetaminophen in any 24 hour period.  All opioids tend to cause constipation. Drink plenty of water and eat foods that have a lot of fiber, such as fruits, vegetables, prune juice, apple juice and high fiber cereal.  Take a laxative if you don t move your bowels at least every other day. Miralax , Milk of Magnesia, Colace , or Senna  can be used to keep you regular.            Discharge References/Attachments     PHARYNGITIS, STREP (PRESUMED) (ENGLISH)      24 Hour Appointment Hotline       To make an appointment at any [facility] clinic, call [phone] ([phone]). If you don't have a family doctor or clinic, we will help you find one. [facility] clinics are conveniently located to serve the needs of you and your family.             Review of your medicines      START taking        Dose / Directions Last dose taken    clindamycin 300 MG capsule   Commonly known as:  CLEOCIN   Dose:  300 mg   Quantity:  28 capsule        Take 1 capsule (300 mg) by mouth 4 times daily for 7 days   Refills:  0        HYDROcodone-acetaminophen 5-325 MG per tablet   Commonly known as:  NORCO   Dose:  1 tablet   Quantity:  10 tablet        Take 1 tablet by mouth every 4 hours as needed for moderate to severe pain No driving  a car or drinking alcohol for 6 hours after taking this medication.   Refills:  0          Our records show that you are taking the medicines listed below. If these are incorrect, please call your family doctor or clinic.        Dose / Directions Last dose taken    albuterol 108 (90 BASE) MCG/ACT Inhaler   Commonly known as:  PROAIR HFA/PROVENTIL HFA/VENTOLIN HFA   Dose:  2 puff   Quantity:  1 Inhaler        Inhale 2 puffs into the lungs every 6 hours as needed for shortness of breath / dyspnea or wheezing   Refills:  0        benzonatate 100 MG capsule   Commonly known as:  TESSALON   Dose:  100 mg   Quantity:  42 capsule        Take 1 capsule (100 mg) by mouth 3 times daily as needed for cough   Refills:  0        guaiFENesin-codeine 100-10 MG/5ML Soln solution   Commonly known as:  ROBITUSSIN AC   Dose:  2 tsp.   Quantity:  120 mL        Take 10 mLs by mouth every 4 hours as needed for cough   Refills:  0        ibuprofen 200 MG tablet   Commonly known as:  ADVIL/MOTRIN   Dose:  400 mg   Quantity:  40 tablet        Take 2 tablets (400 mg) by mouth every 4 hours as needed for mild pain   Refills:  0        ondansetron 4 MG tablet   Commonly known as:  ZOFRAN   Dose:  4 mg   Quantity:  6 tablet        Take 1 tablet (4 mg) by mouth every 6 hours as needed for nausea   Refills:  0        oxyCODONE-acetaminophen 5-325 MG per tablet   Commonly known as:  PERCOCET   Quantity:  10 tablet        Take one tablet with one 325 mg tylenol every 6 hours   Refills:  0                Prescriptions were sent or printed at these locations (2 Prescriptions)                   Other Prescriptions                Printed at Department/Unit printer (2 of 2)         clindamycin (CLEOCIN) 300 MG capsule               HYDROcodone-acetaminophen (NORCO) 5-325 MG per tablet                Procedures and tests performed during your visit     Beta strep group A culture    Rapid strep screen      Orders Needing Specimen Collection     None       Pending Results     Date and Time Order Name Status Description    12/17/2017 1028 Beta strep group A culture In process             Pending Culture Results     Date and Time Order Name Status Description    12/17/2017 1028 Beta strep group A culture In process             Pending Results Instructions     If you had any lab results that were not finalized at the time of your Discharge, you can call the ED Lab Result RN at [phone]. You will be contacted by this team for any positive Lab results or changes in treatment. The nurses are available 7 days a week from 10A to 6:30P.  You can leave a message 24 hours per day and they will return your call.        Test Results From Your Hospital Stay        12/17/2017 10:52 AM      Component Results     Component    Specimen Description    Throat    Rapid Strep A Screen    NEGATIVE: No Group A streptococcal antigen detected by immunoassay, await culture report.         12/17/2017 10:55 AM                Clinical Quality Measure: Blood Pressure Screening     Your blood pressure was checked while you were in the emergency department today. The last reading we obtained was  BP: 109/71 . Please read the guidelines below about what these numbers mean and what you should do about them.  If your systolic blood pressure (the top number) is less than 120 and your diastolic blood pressure (the bottom number) is less than 80, then your blood pressure is normal. There is nothing more that you need to do about it.  If your systolic blood pressure (the top number) is 120-139 or your diastolic blood pressure (the bottom number) is 80-89, your blood pressure may be higher than it should be. You should have your blood pressure rechecked within a year by a primary care provider.  If your systolic blood pressure (the top number) is 140 or greater or your diastolic blood pressure (the bottom number) is 90 or greater, you may have high blood pressure. High blood pressure is treatable, but  "if left untreated over time it can put you at risk for heart attack, stroke, or kidney failure. You should have your blood pressure rechecked by a primary care provider within the next 4 weeks.  If your provider in the emergency department today gave you specific instructions to follow-up with your doctor or provider even sooner than that, you should follow that instruction and not wait for up to 4 weeks for your follow-up visit.        Thank you for choosing [facility]       Thank you for choosing [facility] for your care. Our goal is always to provide you with excellent care. Hearing back from our patients is one way we can continue to improve our services. Please take a few minutes to complete the written survey that you may receive in the mail after you visit with us. Thank you!        [facility] Information     [employer] lets you send messages to your doctor, view your test results, renew your prescriptions, schedule appointments and more. To sign up, go to [email or web address] . Click on \"Log in\" on the left side of the screen, which will take you to the Welcome page. Then click on \"Sign up Now\" on the right side of the page.     You will be asked to enter the access code listed below, as well as some personal information. Please follow the directions to create your username and password.     Your access code is: [email or web address]  Expires: 2018 11:52 AM     Your access code will  in 90 days. If you need help or a new code, please call your [facility] clinic or [phone].        Care EveryWhere ID     This is your Care EveryWhere ID. This could be used by other organizations to access your [facility] medical records  [id number]        Equal Access to Services     [facility] : Hadii dana Ware, wajamie jaquez, qaav green. So [facility] [phone].    ATENCIÓN: Si habla español, tiene a lopez disposición servicios gratuitos de asistencia " alka Banguranicole al [phone].    We comply with applicable federal civil rights laws and [place] laws. We do not discriminate on the basis of race, color, national origin, age, disability, sex, sexual orientation, or gender identity.            After Visit Summary       This is your record. Keep this with you and show to your community pharmacist(s) and doctor(s) at your next visit.

## 2017-12-19 LAB
BACTERIA SPEC CULT: ABNORMAL
Lab: ABNORMAL
SPECIMEN SOURCE: ABNORMAL

## 2018-06-03 ENCOUNTER — HOSPITAL ENCOUNTER (EMERGENCY)
Facility: CLINIC | Age: 33
Discharge: HOME | End: 2018-06-03
Attending: EMERGENCY MEDICINE | Admitting: EMERGENCY MEDICINE
Payer: COMMERCIAL

## 2018-06-03 ENCOUNTER — APPOINTMENT (OUTPATIENT)
Dept: ULTRASOUND IMAGING | Facility: CLINIC | Age: 33
End: 2018-06-03
Attending: EMERGENCY MEDICINE
Payer: COMMERCIAL

## 2018-06-03 VITALS
RESPIRATION RATE: 16 BRPM | SYSTOLIC BLOOD PRESSURE: 94 MMHG | WEIGHT: 145 LBS | BODY MASS INDEX: 26.52 KG/M2 | OXYGEN SATURATION: 100 % | DIASTOLIC BLOOD PRESSURE: 69 MMHG | TEMPERATURE: 98 F

## 2018-06-03 DIAGNOSIS — R11.2 NAUSEA AND VOMITING, INTRACTABILITY OF VOMITING NOT SPECIFIED, UNSPECIFIED VOMITING TYPE: ICD-10-CM

## 2018-06-03 DIAGNOSIS — Z34.91 FIRST TRIMESTER PREGNANCY: ICD-10-CM

## 2018-06-03 LAB
ALBUMIN SERPL-MCNC: 3.5 G/DL (ref 3.4–5)
ALBUMIN UR-MCNC: NEGATIVE MG/DL
ALP SERPL-CCNC: 64 U/L (ref 40–150)
ALT SERPL W P-5'-P-CCNC: 42 U/L (ref 0–50)
ANION GAP SERPL CALCULATED.3IONS-SCNC: 8 MMOL/L (ref 3–14)
APPEARANCE UR: CLEAR
AST SERPL W P-5'-P-CCNC: 25 U/L (ref 0–45)
B-HCG SERPL-ACNC: ABNORMAL IU/L (ref 0–5)
BASOPHILS # BLD AUTO: 0.1 10E9/L (ref 0–0.2)
BASOPHILS NFR BLD AUTO: 0.4 %
BILIRUB SERPL-MCNC: 0.2 MG/DL (ref 0.2–1.3)
BILIRUB UR QL STRIP: NEGATIVE
BUN SERPL-MCNC: 13 MG/DL (ref 7–30)
CALCIUM SERPL-MCNC: 8.4 MG/DL (ref 8.5–10.1)
CHLORIDE SERPL-SCNC: 107 MMOL/L (ref 94–109)
CO2 SERPL-SCNC: 22 MMOL/L (ref 20–32)
COLOR UR AUTO: ABNORMAL
CREAT SERPL-MCNC: 0.88 MG/DL (ref 0.52–1.04)
DIFFERENTIAL METHOD BLD: ABNORMAL
EOSINOPHIL # BLD AUTO: 0.3 10E9/L (ref 0–0.7)
EOSINOPHIL NFR BLD AUTO: 2.4 %
ERYTHROCYTE [DISTWIDTH] IN BLOOD BY AUTOMATED COUNT: 13.5 % (ref 10–15)
GFR SERPL CREATININE-BSD FRML MDRD: 74 ML/MIN/1.7M2
GLUCOSE SERPL-MCNC: 79 MG/DL (ref 70–99)
GLUCOSE UR STRIP-MCNC: NEGATIVE MG/DL
HCG UR QL: POSITIVE
HCT VFR BLD AUTO: 34.3 % (ref 35–47)
HGB BLD-MCNC: 11.8 G/DL (ref 11.7–15.7)
HGB UR QL STRIP: NEGATIVE
IMM GRANULOCYTES # BLD: 0 10E9/L (ref 0–0.4)
IMM GRANULOCYTES NFR BLD: 0.3 %
KETONES UR STRIP-MCNC: NEGATIVE MG/DL
LEUKOCYTE ESTERASE UR QL STRIP: ABNORMAL
LYMPHOCYTES # BLD AUTO: 4.6 10E9/L (ref 0.8–5.3)
LYMPHOCYTES NFR BLD AUTO: 38.2 %
MCH RBC QN AUTO: 31.1 PG (ref 26.5–33)
MCHC RBC AUTO-ENTMCNC: 34.4 G/DL (ref 31.5–36.5)
MCV RBC AUTO: 90 FL (ref 78–100)
MONOCYTES # BLD AUTO: 0.7 10E9/L (ref 0–1.3)
MONOCYTES NFR BLD AUTO: 6.1 %
MUCOUS THREADS #/AREA URNS LPF: PRESENT /LPF
NEUTROPHILS # BLD AUTO: 6.4 10E9/L (ref 1.6–8.3)
NEUTROPHILS NFR BLD AUTO: 52.6 %
NITRATE UR QL: NEGATIVE
NRBC # BLD AUTO: 0 10*3/UL
NRBC BLD AUTO-RTO: 0 /100
PH UR STRIP: 6 PH (ref 5–7)
PLATELET # BLD AUTO: 285 10E9/L (ref 150–450)
POTASSIUM SERPL-SCNC: 4.1 MMOL/L (ref 3.4–5.3)
PROT SERPL-MCNC: 7.6 G/DL (ref 6.8–8.8)
RBC # BLD AUTO: 3.8 10E12/L (ref 3.8–5.2)
RBC #/AREA URNS AUTO: 1 /HPF (ref 0–2)
SODIUM SERPL-SCNC: 137 MMOL/L (ref 133–144)
SOURCE: ABNORMAL
SP GR UR STRIP: 1.02 (ref 1–1.03)
SQUAMOUS #/AREA URNS AUTO: 11 /HPF (ref 0–1)
UROBILINOGEN UR STRIP-MCNC: NORMAL MG/DL (ref 0–2)
WBC # BLD AUTO: 12.1 10E9/L (ref 4–11)
WBC #/AREA URNS AUTO: 1 /HPF (ref 0–5)

## 2018-06-03 PROCEDURE — 99285 EMERGENCY DEPT VISIT HI MDM: CPT | Mod: 25

## 2018-06-03 PROCEDURE — 25000128 H RX IP 250 OP 636: Performed by: EMERGENCY MEDICINE

## 2018-06-03 PROCEDURE — 25000125 ZZHC RX 250: Performed by: EMERGENCY MEDICINE

## 2018-06-03 PROCEDURE — 84702 CHORIONIC GONADOTROPIN TEST: CPT | Performed by: EMERGENCY MEDICINE

## 2018-06-03 PROCEDURE — 81001 URINALYSIS AUTO W/SCOPE: CPT | Performed by: EMERGENCY MEDICINE

## 2018-06-03 PROCEDURE — 80053 COMPREHEN METABOLIC PANEL: CPT | Performed by: EMERGENCY MEDICINE

## 2018-06-03 PROCEDURE — 25000132 ZZH RX MED GY IP 250 OP 250 PS 637: Performed by: EMERGENCY MEDICINE

## 2018-06-03 PROCEDURE — 76817 TRANSVAGINAL US OBSTETRIC: CPT

## 2018-06-03 PROCEDURE — 96374 THER/PROPH/DIAG INJ IV PUSH: CPT

## 2018-06-03 PROCEDURE — 96375 TX/PRO/DX INJ NEW DRUG ADDON: CPT

## 2018-06-03 PROCEDURE — 96361 HYDRATE IV INFUSION ADD-ON: CPT

## 2018-06-03 PROCEDURE — 85025 COMPLETE CBC W/AUTO DIFF WBC: CPT | Performed by: EMERGENCY MEDICINE

## 2018-06-03 RX ORDER — ONDANSETRON 4 MG/1
4 TABLET, ORALLY DISINTEGRATING ORAL ONCE
Status: COMPLETED | OUTPATIENT
Start: 2018-06-03 | End: 2018-06-03

## 2018-06-03 RX ORDER — PROCHLORPERAZINE MALEATE 10 MG
10 TABLET ORAL EVERY 6 HOURS PRN
Qty: 20 TABLET | Refills: 1 | Status: SHIPPED | OUTPATIENT
Start: 2018-06-03 | End: 2018-08-12

## 2018-06-03 RX ORDER — ACETAMINOPHEN 500 MG
1000 TABLET ORAL ONCE
Status: COMPLETED | OUTPATIENT
Start: 2018-06-03 | End: 2018-06-03

## 2018-06-03 RX ORDER — ONDANSETRON 2 MG/ML
4 INJECTION INTRAMUSCULAR; INTRAVENOUS ONCE
Status: COMPLETED | OUTPATIENT
Start: 2018-06-03 | End: 2018-06-03

## 2018-06-03 RX ORDER — ONDANSETRON 2 MG/ML
4 INJECTION INTRAMUSCULAR; INTRAVENOUS ONCE
Status: DISCONTINUED | OUTPATIENT
Start: 2018-06-03 | End: 2018-06-03 | Stop reason: CLARIF

## 2018-06-03 RX ADMIN — ONDANSETRON 4 MG: 2 INJECTION INTRAMUSCULAR; INTRAVENOUS at 21:02

## 2018-06-03 RX ADMIN — ACETAMINOPHEN 1000 MG: 500 TABLET, FILM COATED ORAL at 21:42

## 2018-06-03 RX ADMIN — PROCHLORPERAZINE EDISYLATE 5 MG: 5 INJECTION INTRAMUSCULAR; INTRAVENOUS at 22:54

## 2018-06-03 RX ADMIN — SODIUM CHLORIDE 1000 ML: 9 INJECTION, SOLUTION INTRAVENOUS at 21:02

## 2018-06-03 RX ADMIN — ONDANSETRON 4 MG: 4 TABLET, ORALLY DISINTEGRATING ORAL at 19:52

## 2018-06-03 ASSESSMENT — ENCOUNTER SYMPTOMS
FREQUENCY: 1
ABDOMINAL PAIN: 1
DYSURIA: 0
DIFFICULTY URINATING: 0
VOMITING: 1
NAUSEA: 1
FEVER: 0
DIARRHEA: 0

## 2018-06-03 NOTE — ED AVS SNAPSHOT
Emergency Department    [address] [place] [address]    Phone:  [phone]    Fax:  [phone]                                       Akua Gr   MRN: [id number]    Department:   Emergency Department   Date of Visit:  6/3/2018           After Visit Summary Signature Page     I have received my discharge instructions, and my questions have been answered. I have discussed any challenges I see with this plan with the nurse or doctor.    ..........................................................................................................................................  Patient/Patient Representative Signature      ..........................................................................................................................................  Patient Representative Print Name and Relationship to Patient    ..................................................               ................................................  Date                                            Time    ..........................................................................................................................................  Reviewed by Signature/Title    ...................................................              ..............................................  Date                                                            Time

## 2018-06-03 NOTE — ED AVS SNAPSHOT
Emergency Department    [address] [facility] [address]    Phone:  [phone]    Fax:  [phone]                                       Akua Gr   MRN: [id number]    Department:   Emergency Department   Date of Visit:  6/3/2018           Patient Information     Date Of Birth          1985        Your diagnoses for this visit were:     First trimester pregnancy     Nausea and vomiting, intractability of vomiting not specified, unspecified vomiting type        You were seen by Diana Polanco MD.      Follow-up Information     Follow up with [facility].    Specialties:  Internal Medicine, Pediatrics, Obstetrics & Gynecology    Why:  this week    Contact information:    [address] [address] [phone]      Discharge References/Attachments     HYPEREMESIS GRAVIDARUM (ENGLISH)      24 Hour Appointment Hotline       To make an appointment at any [facility], call [phone] ([phone]). If you don't have a family doctor or clinic, we will help you find one. [facility] are conveniently located to serve the needs of you and your family.             Review of your medicines      START taking        Dose / Directions Last dose taken    prochlorperazine 10 MG tablet   Commonly known as:  COMPAZINE   Dose:  10 mg   Quantity:  20 tablet        Take 1 tablet (10 mg) by mouth every 6 hours as needed for nausea or vomiting   Refills:  1          Our records show that you are taking the medicines listed below. If these are incorrect, please call your family doctor or clinic.        Dose / Directions Last dose taken    albuterol 108 (90 Base) MCG/ACT Inhaler   Commonly known as:  PROAIR HFA/PROVENTIL HFA/VENTOLIN HFA   Dose:  2 puff   Quantity:  1 Inhaler        Inhale 2 puffs into the lungs every 6 hours as needed for shortness of breath / dyspnea or wheezing   Refills:  0        benzonatate 100 MG capsule    Commonly known as:  TESSALON   Dose:  100 mg   Quantity:  42 capsule        Take 1 capsule (100 mg) by mouth 3 times daily as needed for cough   Refills:  0        guaiFENesin-codeine 100-10 MG/5ML Soln solution   Commonly known as:  ROBITUSSIN AC   Dose:  2 tsp.   Quantity:  120 mL        Take 10 mLs by mouth every 4 hours as needed for cough   Refills:  0        ibuprofen 200 MG tablet   Commonly known as:  ADVIL/MOTRIN   Dose:  400 mg   Quantity:  40 tablet        Take 2 tablets (400 mg) by mouth every 4 hours as needed for mild pain   Refills:  0        ondansetron 4 MG tablet   Commonly known as:  ZOFRAN   Dose:  4 mg   Quantity:  6 tablet        Take 1 tablet (4 mg) by mouth every 6 hours as needed for nausea   Refills:  0                Prescriptions were sent or printed at these locations (1 Prescription)                   Other Prescriptions                Printed at Department/Unit printer (1 of 1)         prochlorperazine (COMPAZINE) 10 MG tablet                Procedures and tests performed during your visit     CBC with platelets + differential    Comprehensive metabolic panel    HCG qualitative urine    HCG quantitative pregnancy    UA with Microscopic    US OB <14 Weeks w Transvaginal Single      Orders Needing Specimen Collection     None      Pending Results     Date and Time Order Name Status Description    6/3/2018 2157 US OB <14 Weeks w Transvaginal Single Preliminary             Pending Culture Results     No orders found from 6/1/2018 to 6/4/2018.            Pending Results Instructions     If you had any lab results that were not finalized at the time of your Discharge, you can call the ED Lab Result RN at [phone]. You will be contacted by this team for any positive Lab results or changes in treatment. The nurses are available 7 days a week from 10A to 6:30P.  You can leave a message 24 hours per day and they will return your call.        Test Results From Your Hospital Stay         6/3/2018  9:10 PM      Component Results     Component Value Ref Range & Units Status    WBC 12.1 (H) 4.0 - 11.0 10e9/L Final    RBC Count 3.80 3.8 - 5.2 10e12/L Final    Hemoglobin 11.8 11.7 - 15.7 g/dL Final    Hematocrit 34.3 (L) 35.0 - 47.0 % Final    MCV 90 78 - 100 fl Final    MCH 31.1 26.5 - 33.0 pg Final    MCHC 34.4 31.5 - 36.5 g/dL Final    RDW 13.5 10.0 - 15.0 % Final    Platelet Count 285 150 - 450 10e9/L Final    Diff Method Automated Method  Final    % Neutrophils 52.6 % Final    % Lymphocytes 38.2 % Final    % Monocytes 6.1 % Final    % Eosinophils 2.4 % Final    % Basophils 0.4 % Final    % Immature Granulocytes 0.3 % Final    Nucleated RBCs 0 0 /100 Final    Absolute Neutrophil 6.4 1.6 - 8.3 10e9/L Final    Absolute Lymphocytes 4.6 0.8 - 5.3 10e9/L Final    Absolute Monocytes 0.7 0.0 - 1.3 10e9/L Final    Absolute Eosinophils 0.3 0.0 - 0.7 10e9/L Final    Absolute Basophils 0.1 0.0 - 0.2 10e9/L Final    Abs Immature Granulocytes 0.0 0 - 0.4 10e9/L Final    Absolute Nucleated RBC 0.0  Final         6/3/2018  9:25 PM      Component Results     Component Value Ref Range & Units Status    HCG Qual Urine Positive (A) NEG^Negative Final    This test is for screening purposes.  Results should be interpreted along with   the clinical picture.  Confirmation testing is available if warranted by   ordering [id number], HCG Quantitative Pregnancy.           6/3/2018  9:26 PM      Component Results     Component Value Ref Range & Units Status    Sodium 137 133 - 144 mmol/L Final    Potassium 4.1 3.4 - 5.3 mmol/L Final    Chloride 107 94 - 109 mmol/L Final    Carbon Dioxide 22 20 - 32 mmol/L Final    Anion Gap 8 3 - 14 mmol/L Final    Glucose 79 70 - 99 mg/dL Final    Urea Nitrogen 13 7 - 30 mg/dL Final    Creatinine 0.88 0.52 - 1.04 mg/dL Final    GFR Estimate 74 >60 mL/min/1.7m2 Final    Non  GFR Calc    GFR Estimate If Black 89 >60 mL/min/1.7m2 Final    African American GFR Calc    Calcium 8.4 (L)  8.5 - 10.1 mg/dL Final    Bilirubin Total 0.2 0.2 - 1.3 mg/dL Final    Albumin 3.5 3.4 - 5.0 g/dL Final    Protein Total 7.6 6.8 - 8.8 g/dL Final    Alkaline Phosphatase 64 40 - 150 U/L Final    ALT 42 0 - 50 U/L Final    AST 25 0 - 45 U/L Final         6/3/2018  9:23 PM      Component Results     Component Value Ref Range & Units Status    Color Urine Light Yellow  Final    Appearance Urine Clear  Final    Glucose Urine Negative NEG^Negative mg/dL Final    Bilirubin Urine Negative NEG^Negative Final    Ketones Urine Negative NEG^Negative mg/dL Final    Specific Gravity Urine 1.019 1.003 - 1.035 Final    Blood Urine Negative NEG^Negative Final    pH Urine 6.0 5.0 - 7.0 pH Final    Protein Albumin Urine Negative NEG^Negative mg/dL Final    Urobilinogen mg/dL Normal 0.0 - 2.0 mg/dL Final    Nitrite Urine Negative NEG^Negative Final    Leukocyte Esterase Urine Trace (A) NEG^Negative Final    Source Midstream Urine  Final    WBC Urine 1 0 - 5 /HPF Final    RBC Urine 1 0 - 2 /HPF Final    Squamous Epithelial /HPF Urine 11 (H) 0 - 1 /HPF Final    Mucous Urine Present (A) NEG^Negative /LPF Final         6/3/2018 11:51 PM      Narrative     US OB LESS THAN 14 WEEKS WITH TRANSVAGINAL SINGLE 6/3/2018 11:36 PM     INDICATION: Lower abdominal pain, pregnancy test positive today, as  above.     COMPARISON: None.    FINDINGS: Transabdominal followed by endovaginal ultrasound to assess  early pregnancy. There is a single live IUP of 6 weeks and 6 days by  crown-rump length. Fetal heart rate 140 bpm. The BENNETT by crown-rump  length is 1/21/2019. Ovaries are negative. No abnormality seen.        Impression     IMPRESSION: Unremarkable early live IUP, 6 weeks and 6 days.           6/3/2018 11:06 PM      Component Results     Component Value Ref Range & Units Status    HCG Quantitative Serum 78627 (H) 0 - 5 IU/L Final                Clinical Quality Measure: Blood Pressure Screening     Your blood pressure was checked while you were in  "the emergency department today. The last reading we obtained was  BP: 94/69 . Please read the guidelines below about what these numbers mean and what you should do about them.  If your systolic blood pressure (the top number) is less than 120 and your diastolic blood pressure (the bottom number) is less than 80, then your blood pressure is normal. There is nothing more that you need to do about it.  If your systolic blood pressure (the top number) is 120-139 or your diastolic blood pressure (the bottom number) is 80-89, your blood pressure may be higher than it should be. You should have your blood pressure rechecked within a year by a primary care provider.  If your systolic blood pressure (the top number) is 140 or greater or your diastolic blood pressure (the bottom number) is 90 or greater, you may have high blood pressure. High blood pressure is treatable, but if left untreated over time it can put you at risk for heart attack, stroke, or kidney failure. You should have your blood pressure rechecked by a primary care provider within the next 4 weeks.  If your provider in the emergency department today gave you specific instructions to follow-up with your doctor or provider even sooner than that, you should follow that instruction and not wait for up to 4 weeks for your follow-up visit.        Thank you for choosing [facility]       Thank you for choosing [facility] for your care. Our goal is always to provide you with excellent care. Hearing back from our patients is one way we can continue to improve our services. Please take a few minutes to complete the written survey that you may receive in the mail after you visit with us. Thank you!        [facility] Information     [employer] lets you send messages to your doctor, view your test results, renew your prescriptions, schedule appointments and more. To sign up, go to [email or web address] . Click on \"Log in\" on the left side of the screen, which will take you to the " "Welcome page. Then click on \"Sign up Now\" on the right side of the page.     You will be asked to enter the access code listed below, as well as some personal information. Please follow the directions to create your username and password.     Your access code is: [email or web address]  Expires: 2018 10:06 AM     Your access code will  in 90 days. If you need help or a new code, please call your [facility] clinic or [phone].        Care EveryWhere ID     This is your Care EveryWhere ID. This could be used by other organizations to access your [facility] medical records  [id number]        Equal Access to Services     [facility] : Jarrell Ware, daniel jaquez, rehan gómez, av riggins. So [facility] [phone].    ATENCIÓN: Si habla español, tiene a lpoez disposición servicios gratuitos de asistencia lingüística. Llame al [phone].    We comply with applicable federal civil rights laws and [place] laws. We do not discriminate on the basis of race, color, national origin, age, disability, sex, sexual orientation, or gender identity.            After Visit Summary       This is your record. Keep this with you and show to your community pharmacist(s) and doctor(s) at your next visit.                  "

## 2018-06-04 NOTE — ED PROVIDER NOTES
History     Chief Complaint:  Nausea & Vomiting    HPI   Akua Gr is a 33 year old female who presents to the emergency department for evaluation of nausea and vomiting. The patient reports she began experiencing nausea and vomiting yesterday, accompanied by bilateral lower abdominal pain and light-headedness. She further reports slight increase of urination frequency but no dysuria. The patient denies any fever or diarrhea and indicates no known sick contacts. She further denies any history of ectopic pregnancy or STI. She states she had her last period in May and intermittently takes birth control pills.     Allergies:  Penicllins    Medications:    Albuterol  Tessalon  Robitussin  Ibuprofen  Zofran    Past Medical History:    Tobacco use disorder  Prior pregnancy complicated by IUGR, antepartum    Past Surgical History:    C Section    Family History:    No past pertinent family history.    Social History:  Presents with boyfriend and child.  Lives with her children.  Recently moved to the area, does not have primary care physician here.  Current every day smoker, 0.25 ppd.  Positive for alcohol use.    Marital Status:  Single [1]     Review of Systems   Constitutional: Negative for fever.   Gastrointestinal: Positive for abdominal pain (lower), nausea and vomiting. Negative for diarrhea.   Genitourinary: Positive for frequency. Negative for difficulty urinating and dysuria.   All other systems reviewed and are negative.    Physical Exam     Patient Vitals for the past 24 hrs:   BP Temp Temp src Heart Rate Resp SpO2 Weight   06/03/18 2354 94/69 - - - - - -   06/03/18 1947 112/65 98  F (36.7  C) Temporal 59 16 100 % 65.8 kg (145 lb)       Physical Exam  Constitutional:  Oriented to person, place, and time.      Appears well-developed and well-nourished.   HENT:   Head:    Normocephalic and atraumatic.   Right Ear:   Tympanic membrane and external ear normal.   Left Ear:   Tympanic membrane and external ear  normal.   Mouth/Throat:   Oropharynx is clear and moist.      Mucous membranes are normal.   Eyes:    Conjunctivae normal and EOM are normal.      Pupils are equal, round, and reactive to light.   Neck:    Normal range of motion. Neck supple.   Cardiovascular:  Normal rate, regular rhythm, S1 normal and S2 normal.      No gallop and no friction rub. No murmur heard.  Pulmonary/Chest:  Breath sounds normal. No respiratory distress.      No wheezes. No rhonchi. No rales.   Abdominal:   Soft. No hepatosplenomegaly. Tenderness across lower abdomen, bilateral, no guarding.     No rebound and no CVA tenderness.   Musculoskeletal:  Normal range of motion.   Neurological:   Alert and oriented to person, place, and time. Normal strength.      GCS eye subscore is 4. GCS verbal subscore is 5.      GCS motor subscore is 6.   Skin:    Skin is warm and dry.   Psychiatric:   Normal mood and affect.      Speech is normal and behavior is normal.      Judgment and thought content normal.      Cognition and memory are normal.     Emergency Department Course     Imaging:  Radiographic findings were communicated with the patient and family who voiced understanding of the findings.    US OB < 14 Weeks w Transvaginal Single:  Unremarkable early live IUP, 6 weeks and 6 days. As per radiology.    Laboratory:  CBC: WBC: 12.1 (H), HGB: 11.8, PLT: 285  CMP: Calcium 8.4 (L), o/w WNL (Creatinine: 0.88)    HCG Qualitative Urine: Positive  HC (H)    UA with micro: Leukocyte Esterase, Squamous Epithelial 11 (H), Mucous Present o/w negative    Interventions:   Zofran-ODT tablet 4 mg PO   NS 1L IV   Zofran 4 mg IV   Tylenol 1000 mg PO   Compazine 5 mg IV    Emergency Department Course:  Nursing notes and vitals reviewed.  I performed an exam of the patient as documented above.     The patient provided a urine sample here in the emergency department. This was sent for laboratory testing, findings above.     IV inserted.  Medicine administered as documented above. Blood drawn. This was sent to the lab for further testing, results above.    2353 I rechecked the patient and discussed the results of her workup thus far.     Findings and plan explained to the Patient and significant other. Patient discharged home with instructions regarding supportive care, medications, and reasons to return. The importance of close follow-up was reviewed. The patient was prescribed Compazine.    I personally reviewed the laboratory results with the Patient and significant other and answered all related questions prior to discharge.     Impression & Plan      Medical Decision Making:  The patient is a 33-year-old who comes in with vomiting without diarrhea or fever.  She says her last period was in May but notes that she only takes her birth control some of the time and so is concerned she could be pregnant.  She is indeed pregnant.  She had hyperemesis with previous pregnancy.  She has been given IV fluids here and initially had Zofran before the positive pregnancy test.  She feels better.  Ultrasound per technician showed gestational sac with cardiac activity, but I do not have the official report at time of discharge.  Patient wishes to leave as she needs to return a car(Addendum:Report came as she was leaving and I discussed with her) .  She will follow-up with [facility] this week.  She asked that I call her if there are any other findings on the ultrasound but is not willing to wait any longer.  Critical Care time:  none    Diagnosis:    ICD-10-CM   1. First trimester pregnancy Z34.90   2. Nausea and vomiting, intractability of vomiting not specified, unspecified vomiting type R11.2       Disposition:  discharged to home with Compazine,    Discharge Medications:  Discharge Medication List as of 6/3/2018 11:55 PM      START taking these medications    Details   prochlorperazine (COMPAZINE) 10 MG tablet Take 1 tablet (10 mg) by mouth every 6  hours as needed for nausea or vomiting, Disp-20 tablet, R-1, Local Print           I, Manny Coe, am serving as a scribe on 6/3/2018 at 8:50 PM to personally document services performed by Diana Polanco MD based on my observations and the provider's statements to me.     Manny Coe  6/3/2018    EMERGENCY DEPARTMENT       Diana Polanco MD  06/04/18 2026

## 2018-07-03 PROCEDURE — 96375 TX/PRO/DX INJ NEW DRUG ADDON: CPT

## 2018-07-03 PROCEDURE — 96374 THER/PROPH/DIAG INJ IV PUSH: CPT

## 2018-07-03 PROCEDURE — 96361 HYDRATE IV INFUSION ADD-ON: CPT

## 2018-07-03 PROCEDURE — 99285 EMERGENCY DEPT VISIT HI MDM: CPT | Mod: 25

## 2018-07-04 ENCOUNTER — APPOINTMENT (OUTPATIENT)
Dept: ULTRASOUND IMAGING | Facility: CLINIC | Age: 33
End: 2018-07-04
Attending: EMERGENCY MEDICINE
Payer: COMMERCIAL

## 2018-07-04 ENCOUNTER — HOSPITAL ENCOUNTER (EMERGENCY)
Facility: CLINIC | Age: 33
Discharge: HOME | End: 2018-07-04
Attending: EMERGENCY MEDICINE | Admitting: EMERGENCY MEDICINE
Payer: COMMERCIAL

## 2018-07-04 VITALS
WEIGHT: 146 LBS | BODY MASS INDEX: 26.87 KG/M2 | SYSTOLIC BLOOD PRESSURE: 96 MMHG | DIASTOLIC BLOOD PRESSURE: 65 MMHG | HEIGHT: 62 IN | OXYGEN SATURATION: 98 % | TEMPERATURE: 98.4 F | RESPIRATION RATE: 18 BRPM

## 2018-07-04 DIAGNOSIS — O21.0 HYPEREMESIS GRAVIDARUM: ICD-10-CM

## 2018-07-04 DIAGNOSIS — Z34.91 INTRAUTERINE NORMAL PREGNANCY, FIRST TRIMESTER: ICD-10-CM

## 2018-07-04 LAB
ABO + RH BLD: NORMAL
ABO + RH BLD: NORMAL
ALBUMIN SERPL-MCNC: 3.4 G/DL (ref 3.4–5)
ALP SERPL-CCNC: 50 U/L (ref 40–150)
ALT SERPL W P-5'-P-CCNC: 19 U/L (ref 0–50)
ANION GAP SERPL CALCULATED.3IONS-SCNC: 10 MMOL/L (ref 3–14)
AST SERPL W P-5'-P-CCNC: 24 U/L (ref 0–45)
B-HCG SERPL-ACNC: ABNORMAL IU/L (ref 0–5)
BASOPHILS # BLD AUTO: 0 10E9/L (ref 0–0.2)
BASOPHILS NFR BLD AUTO: 0.3 %
BILIRUB SERPL-MCNC: 0.6 MG/DL (ref 0.2–1.3)
BLD GP AB SCN SERPL QL: NORMAL
BLOOD BANK CMNT PATIENT-IMP: NORMAL
BUN SERPL-MCNC: 10 MG/DL (ref 7–30)
CALCIUM SERPL-MCNC: 8.8 MG/DL (ref 8.5–10.1)
CHLORIDE SERPL-SCNC: 108 MMOL/L (ref 94–109)
CO2 SERPL-SCNC: 20 MMOL/L (ref 20–32)
CREAT SERPL-MCNC: 0.62 MG/DL (ref 0.52–1.04)
DIFFERENTIAL METHOD BLD: ABNORMAL
EOSINOPHIL # BLD AUTO: 0.2 10E9/L (ref 0–0.7)
EOSINOPHIL NFR BLD AUTO: 1.9 %
ERYTHROCYTE [DISTWIDTH] IN BLOOD BY AUTOMATED COUNT: 13.3 % (ref 10–15)
GFR SERPL CREATININE-BSD FRML MDRD: >90 ML/MIN/1.7M2
GLUCOSE SERPL-MCNC: 87 MG/DL (ref 70–99)
HCT VFR BLD AUTO: 31.4 % (ref 35–47)
HGB BLD-MCNC: 11 G/DL (ref 11.7–15.7)
IMM GRANULOCYTES # BLD: 0 10E9/L (ref 0–0.4)
IMM GRANULOCYTES NFR BLD: 0.3 %
LIPASE SERPL-CCNC: 162 U/L (ref 73–393)
LYMPHOCYTES # BLD AUTO: 3.9 10E9/L (ref 0.8–5.3)
LYMPHOCYTES NFR BLD AUTO: 33.9 %
MCH RBC QN AUTO: 31.3 PG (ref 26.5–33)
MCHC RBC AUTO-ENTMCNC: 35 G/DL (ref 31.5–36.5)
MCV RBC AUTO: 90 FL (ref 78–100)
MONOCYTES # BLD AUTO: 0.7 10E9/L (ref 0–1.3)
MONOCYTES NFR BLD AUTO: 5.7 %
NEUTROPHILS # BLD AUTO: 6.7 10E9/L (ref 1.6–8.3)
NEUTROPHILS NFR BLD AUTO: 57.9 %
NRBC # BLD AUTO: 0 10*3/UL
NRBC BLD AUTO-RTO: 0 /100
PLATELET # BLD AUTO: 266 10E9/L (ref 150–450)
POTASSIUM SERPL-SCNC: 4 MMOL/L (ref 3.4–5.3)
PROT SERPL-MCNC: 7.7 G/DL (ref 6.8–8.8)
RBC # BLD AUTO: 3.51 10E12/L (ref 3.8–5.2)
SODIUM SERPL-SCNC: 138 MMOL/L (ref 133–144)
SPECIMEN EXP DATE BLD: NORMAL
WBC # BLD AUTO: 11.6 10E9/L (ref 4–11)

## 2018-07-04 PROCEDURE — 85025 COMPLETE CBC W/AUTO DIFF WBC: CPT | Performed by: EMERGENCY MEDICINE

## 2018-07-04 PROCEDURE — 76801 OB US < 14 WKS SINGLE FETUS: CPT

## 2018-07-04 PROCEDURE — 83690 ASSAY OF LIPASE: CPT | Performed by: EMERGENCY MEDICINE

## 2018-07-04 PROCEDURE — 86900 BLOOD TYPING SEROLOGIC ABO: CPT | Performed by: EMERGENCY MEDICINE

## 2018-07-04 PROCEDURE — 84702 CHORIONIC GONADOTROPIN TEST: CPT | Performed by: EMERGENCY MEDICINE

## 2018-07-04 PROCEDURE — 86901 BLOOD TYPING SEROLOGIC RH(D): CPT | Performed by: EMERGENCY MEDICINE

## 2018-07-04 PROCEDURE — 86850 RBC ANTIBODY SCREEN: CPT | Performed by: EMERGENCY MEDICINE

## 2018-07-04 PROCEDURE — 25000128 H RX IP 250 OP 636: Performed by: EMERGENCY MEDICINE

## 2018-07-04 PROCEDURE — 80053 COMPREHEN METABOLIC PANEL: CPT | Performed by: EMERGENCY MEDICINE

## 2018-07-04 RX ORDER — DOXYLAMINE SUCCINATE AND PYRIDOXINE HYDROCHLORIDE, DELAYED RELEASE TABLETS 10 MG/10 MG 10; 10 MG/1; MG/1
10 TABLET, DELAYED RELEASE ORAL AT BEDTIME
Qty: 30 TABLET | Refills: 0 | Status: SHIPPED | OUTPATIENT
Start: 2018-07-04 | End: 2018-10-10

## 2018-07-04 RX ORDER — METOCLOPRAMIDE HYDROCHLORIDE 5 MG/ML
10 INJECTION INTRAMUSCULAR; INTRAVENOUS ONCE
Status: COMPLETED | OUTPATIENT
Start: 2018-07-04 | End: 2018-07-04

## 2018-07-04 RX ORDER — DOXYLAMINE SUCCINATE AND PYRIDOXINE HYDROCHLORIDE, DELAYED RELEASE TABLETS 10 MG/10 MG 10; 10 MG/1; MG/1
10 TABLET, DELAYED RELEASE ORAL AT BEDTIME
Qty: 30 TABLET | Refills: 0 | Status: ON HOLD | OUTPATIENT
Start: 2018-07-04 | End: 2019-01-16

## 2018-07-04 RX ORDER — DIPHENHYDRAMINE HYDROCHLORIDE 50 MG/ML
25 INJECTION INTRAMUSCULAR; INTRAVENOUS ONCE
Status: COMPLETED | OUTPATIENT
Start: 2018-07-04 | End: 2018-07-04

## 2018-07-04 RX ADMIN — METOCLOPRAMIDE 10 MG: 5 INJECTION, SOLUTION INTRAMUSCULAR; INTRAVENOUS at 01:04

## 2018-07-04 RX ADMIN — SODIUM CHLORIDE 1000 ML: 9 INJECTION, SOLUTION INTRAVENOUS at 01:04

## 2018-07-04 RX ADMIN — DIPHENHYDRAMINE HYDROCHLORIDE 25 MG: 50 INJECTION, SOLUTION INTRAMUSCULAR; INTRAVENOUS at 01:19

## 2018-07-04 ASSESSMENT — ENCOUNTER SYMPTOMS
DIZZINESS: 0
NAUSEA: 1
ABDOMINAL PAIN: 1
VOMITING: 1
FEVER: 0
DIFFICULTY URINATING: 0
DYSURIA: 0
NUMBNESS: 0
DIARRHEA: 0
BLOOD IN STOOL: 0

## 2018-07-04 NOTE — ED PROVIDER NOTES
"  History     Chief Complaint:  Nausea and Vomiting    HPI   Akua Gr is a  33 year old female who presents with nausea and vomiting. The patient reports that she is currently 12 weeks pregnant and has been experiencing relatively constant nausea, vomiting, and abdominal pain. Patient states that her abdominal pain is localized to her umbilical region and radiates down into her vagina. Patient developed small amount of vaginal bleeding today as well but denies any stool changes, urinary symptoms, fevers, or any other symptoms. Patient's last menstrual period was on .    Allergies:  Penicillins     Medications:    Albuterol  Tessalon  Robitussin  Ibuprofen  Zofran  Compazine     Past Medical History:    No significant past medical history.     Past Surgical History:     section    Family History:    No pertinent family history.    Social History:  Smoking status: Current smoker  Alcohol use: Yes  Marital Status:  Single      Review of Systems   Constitutional: Negative for fever.   Gastrointestinal: Positive for abdominal pain, nausea and vomiting. Negative for blood in stool and diarrhea.   Genitourinary: Negative for difficulty urinating and dysuria.   Neurological: Negative for dizziness and numbness.   All other systems reviewed and are negative.    Physical Exam     Patient Vitals for the past 24 hrs:   BP Temp Temp src Heart Rate Resp SpO2 Height Weight   18 0245 - - - - - 99 % - -   18 0230 93/58 - - - - - - -   18 0215 - - - - - 99 % - -   18 0210 - - - - - 99 % - -   18 0208 - - - - - 99 % - -   18 0203 - - - - - 99 % - -   18 0200 99/77 - - - - - - -   18 0154 97/68 - - - - 98 % - -   18 2346 132/81 98.4  F (36.9  C) Oral 78 18 97 % 1.575 m (5' 2\") 66.2 kg (146 lb)          Physical Exam  General: Patient is alert and normal appearing.  HEENT: Head atraumatic    Eyes: pupils equal and reactive. Conjunctiva clear   Nares: patent   " Oropharynx: no lesions, uvula midline, no palatal draping, normal voice, no trismus  Neck: Supple without lymphadenopathy, no meningismus  Chest: Heart regular rate and rhythm.   Lungs: Equal clear to auscultation with no wheeze or rales  Abdomen: Soft, non tender, nondistended, normal bowel sounds  Back: No costovertebral angle tenderness, no midline C, T or L spine tenderness  Neuro: Grossly nonfocal, normal speech, strength equal bilaterally, CN 2-12 intact  Extremities: No deformities, equal radial and DP pulses. No clubbing, cyanosis.  No edema  Skin: Warm and dry with no rash.       Emergency Department Course   Imaging:  US OB < 14 weeks:  IMPRESSION: Single live intrauterine pregnancy with estimated gestational age by current measurement of 11 weeks and 4 days corresponding to estimated due date of 1/19/2019. (The reported estimated due date is 1/18/2019. Estimated due date by 6/3/2018 ultrasound is 1/21/2019.)  Report per radiology.    Laboratory:  CBC:  WBC 11.6 (H), HGB 11.0 (L), ,  CMP: WNL (Creatinine 0.62)  Lipase: 162  ABO/Rh type and screen: AB  HCG: POSITIVE 80551 (H)    Interventions:  (0104) Reglan 10 mg, IV injection  (0119) Benadryl, 25 mg, IV injection   (0119) Normal Saline, 1 liter, IV bolus    Emergency Department Course:  Nursing notes and vitals reviewed.  (0031) I performed an exam of the patient as documented above.    Blood was drawn from the patient. This was sent for laboratory testing, findings above.   Urine sample was obtained and sent for laboratory analysis, findings above.  The patient was sent for a ultrasound while in the emergency department, findings above.     Findings and plan explained to the patient. Patient discharged home with instructions regarding supportive care, medications, and reasons to return. The importance of close follow-up was reviewed. The patient was prescribed Doxylamine-pyridoxine.   Impression & Plan    Medical Decision Making:  Akua Gr  is a 33 year old female who presents for evaluation of nausea and vomiting.  She is currently pregnant.  Nonetheless. I considered a broad differential diagnosis for this patient including viral gastroenteritis, food poisoning, bowel obstruction, intra-abdominal infection such as colitis, cholecystitis, UTI, pyelonephritis, appendicitis, etc.  There are no signs of worrisome intra-abdominal pathologies detected during the visit today.  The patient has a completely benign abdominal exam without rebound, guarding, or marked tenderness to palpation.  I doubt ectopic pregnancy based on ultrasound performed in the ED. patient with some element of spotting over the last 2 days but no cramping or heavy bleeding.  Ultrasound shows a viable pregnancy.  Hemoglobin is stable.  She is AB+ blood type therefore no RhoGam is necessary.  Supportive outpatient management is therefore indicated.  Abdominal pain precautions are given for home.    It was discussed with the patient to return to the ED for blood in stool, increasing pain, or fevers more than 102.   Feels much improved after interventions in ED.  See above for medications for home.  I believe all of her symptoms are at this point explained by the pregnancy and hyperemesis gravidarum.      Diagnosis:    ICD-10-CM   1. Hyperemesis gravidarum O21.0   2. Intrauterine normal pregnancy, first trimester Z34.91       Disposition:  Patient is discharged to home.      Discharge Medications:  New Prescriptions    DOXYLAMINE-PYRIDOXINE 10-10 MG TBEC    Take 10 mg by mouth At Bedtime             Clement BLISS, am serving as a scribe on 7/4/2018 at 12:31 AM to personally document services performed by Dr. Del Toro based on my observations and the provider's statements to me.         Clement Pollack  7/3/2018    EMERGENCY DEPARTMENT       Grisel Del Toro MD  07/04/18 0355

## 2018-07-04 NOTE — DISCHARGE INSTRUCTIONS
Hyperemesis Gravidarum  Hyperemesis gravidarum is a severe form of morning sickness that can affect some women during pregnancy. It may develop around the 5th week and last until the 16th week of pregnancy. In some women, it may last longer. Symptoms include severe nausea and vomiting. This can lead to problems such as as weight loss and dehydration.  It is not clear what causes hyperemesis gravidarum. It may be due to rising hormone levels early in the pregnancy. It can be a serious threat to mother and fetus, if symptoms are severe. Therefore, follow the advice below carefully. If symptoms are not controlled by home measures, a hospital stay may be needed. IV (intravenous) fluids and medicines may be given.  Home care    Diet  ? Keep a log of the foods you eat and how they affect your symptoms. Avoid foods that trigger your symptoms.  ? Eat frequent small meals throughout the day rather than three large meals. This can help keep the stomach from being empty, which can make nausea worse.  ? Choose foods that are high in carbohydrates. Eating foods high in protein may also help. Limit greasy or spicy foods.  ? Before getting out of bed in the morning, try eating crackers or dry toast. This may help settle your stomach.  ? Drink cold, clear liquids. Drinking small amounts of liquids with electrolytes, such as sports drinks may help as well.    Medicine  ? If needed, your healthcare provider may prescribe certain medicines to help relieve nausea and vomiting. Vitamin B6 and deya may also be advised. Don t try any over-the-counter medicines or home remedies without talking to your provider first.  Follow-up care  Follow up with your healthcare provider, or as advised.  When to seek medical advice  Call your healthcare provider right away if any of these occur:    Signs of dehydration (dry mouth, extreme thirst, dark urine or little urine output, dizziness, weakness, or fainting)    Vomiting that won t  stop    Inability to keep down liquids    Frequent diarrhea    Weight loss or no weight gain over a 2-week period    Severe constant pain in the lower right abdomen    Fever of 100.4 F (38 C) or higher, or as directed by your provider  Date Last Reviewed: 8/20/2015 2000-2017 [employer]. [address] [place] [id number]. All rights reserved. This information is not intended as a substitute for professional medical care. Always follow your healthcare professional's instructions.

## 2018-07-04 NOTE — ED AVS SNAPSHOT
Emergency Department    [address] [facility] [address]    Phone:  [phone]    Fax:  [phone]                                       Akua Gr   MRN: [id number]    Department:   Emergency Department   Date of Visit:  7/3/2018           Patient Information     Date Of Birth          1985        Your diagnoses for this visit were:     Hyperemesis gravidarum     Intrauterine normal pregnancy, first trimester        You were seen by Grisel Del Toro MD.      Follow-up Information     Follow up with  Emergency Department.    Specialty:  EMERGENCY MEDICINE    Why:  If symptoms worsen    Contact information:    [address] [facility] [address] [phone]        Follow up with [facility] In 2 days.    Why:  If symptoms worsen    Contact information:    [address], [address]  [facility]  [phone]          Discharge Instructions         Hyperemesis Gravidarum  Hyperemesis gravidarum is a severe form of morning sickness that can affect some women during pregnancy. It may develop around the 5th week and last until the 16th week of pregnancy. In some women, it may last longer. Symptoms include severe nausea and vomiting. This can lead to problems such as as weight loss and dehydration.  It is not clear what causes hyperemesis gravidarum. It may be due to rising hormone levels early in the pregnancy. It can be a serious threat to mother and fetus, if symptoms are severe. Therefore, follow the advice below carefully. If symptoms are not controlled by home measures, a hospital stay may be needed. IV (intravenous) fluids and medicines may be given.  Home care    Diet  ? Keep a log of the foods you eat and how they affect your symptoms. Avoid foods that trigger your symptoms.  ? Eat frequent small meals throughout the day rather than three large meals. This can help keep the stomach from being empty, which can make nausea worse.  ? Choose foods  that are high in carbohydrates. Eating foods high in protein may also help. Limit greasy or spicy foods.  ? Before getting out of bed in the morning, try eating crackers or dry toast. This may help settle your stomach.  ? Drink cold, clear liquids. Drinking small amounts of liquids with electrolytes, such as sports drinks may help as well.    Medicine  ? If needed, your healthcare provider may prescribe certain medicines to help relieve nausea and vomiting. Vitamin B6 and deya may also be advised. Don t try any over-the-counter medicines or home remedies without talking to your provider first.  Follow-up care  Follow up with your healthcare provider, or as advised.  When to seek medical advice  Call your healthcare provider right away if any of these occur:    Signs of dehydration (dry mouth, extreme thirst, dark urine or little urine output, dizziness, weakness, or fainting)    Vomiting that won t stop    Inability to keep down liquids    Frequent diarrhea    Weight loss or no weight gain over a 2-week period    Severe constant pain in the lower right abdomen    Fever of 100.4 F (38 C) or higher, or as directed by your provider  Date Last Reviewed: 8/20/2015 2000-2017 [employer]. [address] [place] [address]. All rights reserved. This information is not intended as a substitute for professional medical care. Always follow your healthcare professional's instructions.          24 Hour Appointment Hotline       To make an appointment at any [facility] [place], call [phone] ([phone]). If you don't have a family doctor or clinic, we will help you find one. [place] clinics are conveniently located to serve the needs of you and your family.             Review of your medicines      START taking        Dose / Directions Last dose taken    Doxylamine-Pyridoxine 10-10 MG Tbec   Dose:  10 mg   Quantity:  30 tablet        Take 10 mg by mouth At Bedtime   Refills:  0          Our  records show that you are taking the medicines listed below. If these are incorrect, please call your family doctor or clinic.        Dose / Directions Last dose taken    albuterol 108 (90 Base) MCG/ACT Inhaler   Commonly known as:  PROAIR HFA/PROVENTIL HFA/VENTOLIN HFA   Dose:  2 puff   Quantity:  1 Inhaler        Inhale 2 puffs into the lungs every 6 hours as needed for shortness of breath / dyspnea or wheezing   Refills:  0        benzonatate 100 MG capsule   Commonly known as:  TESSALON   Dose:  100 mg   Quantity:  42 capsule        Take 1 capsule (100 mg) by mouth 3 times daily as needed for cough   Refills:  0        guaiFENesin-codeine 100-10 MG/5ML Soln solution   Commonly known as:  ROBITUSSIN AC   Dose:  2 tsp.   Quantity:  120 mL        Take 10 mLs by mouth every 4 hours as needed for cough   Refills:  0        ibuprofen 200 MG tablet   Commonly known as:  ADVIL/MOTRIN   Dose:  400 mg   Quantity:  40 tablet        Take 2 tablets (400 mg) by mouth every 4 hours as needed for mild pain   Refills:  0        ondansetron 4 MG tablet   Commonly known as:  ZOFRAN   Dose:  4 mg   Quantity:  6 tablet        Take 1 tablet (4 mg) by mouth every 6 hours as needed for nausea   Refills:  0        prochlorperazine 10 MG tablet   Commonly known as:  COMPAZINE   Dose:  10 mg   Quantity:  20 tablet        Take 1 tablet (10 mg) by mouth every 6 hours as needed for nausea or vomiting   Refills:  1                Prescriptions were sent or printed at these locations (1 Prescription)                   Other Prescriptions                Printed at Department/Unit printer (1 of 1)         Doxylamine-Pyridoxine 10-10 MG TBEC                Procedures and tests performed during your visit     ABO/Rh type and screen    CBC with platelets differential    Comprehensive metabolic panel    HCG quantitative pregnancy    Lipase    US OB < 14 Weeks Single      Orders Needing Specimen Collection     None      Pending Results     No orders  found from 7/2/2018 to 7/5/2018.            Pending Culture Results     No orders found from 7/2/2018 to 7/5/2018.            Pending Results Instructions     If you had any lab results that were not finalized at the time of your Discharge, you can call the ED Lab Result RN at [phone]. You will be contacted by this team for any positive Lab results or changes in treatment. The nurses are available 7 days a week from 10A to 6:30P.  You can leave a message 24 hours per day and they will return your call.        Test Results From Your Hospital Stay        7/4/2018  1:05 AM      Component Results     Component Value Ref Range & Units Status    WBC 11.6 (H) 4.0 - 11.0 10e9/L Final    RBC Count 3.51 (L) 3.8 - 5.2 10e12/L Final    Hemoglobin 11.0 (L) 11.7 - 15.7 g/dL Final    Hematocrit 31.4 (L) 35.0 - 47.0 % Final    MCV 90 78 - 100 fl Final    MCH 31.3 26.5 - 33.0 pg Final    MCHC 35.0 31.5 - 36.5 g/dL Final    RDW 13.3 10.0 - 15.0 % Final    Platelet Count 266 150 - 450 10e9/L Final    Diff Method Automated Method  Final    % Neutrophils 57.9 % Final    % Lymphocytes 33.9 % Final    % Monocytes 5.7 % Final    % Eosinophils 1.9 % Final    % Basophils 0.3 % Final    % Immature Granulocytes 0.3 % Final    Nucleated RBCs 0 0 /100 Final    Absolute Neutrophil 6.7 1.6 - 8.3 10e9/L Final    Absolute Lymphocytes 3.9 0.8 - 5.3 10e9/L Final    Absolute Monocytes 0.7 0.0 - 1.3 10e9/L Final    Absolute Eosinophils 0.2 0.0 - 0.7 10e9/L Final    Absolute Basophils 0.0 0.0 - 0.2 10e9/L Final    Abs Immature Granulocytes 0.0 0 - 0.4 10e9/L Final    Absolute Nucleated RBC 0.0  Final         7/4/2018  1:42 AM      Component Results     Component Value Ref Range & Units Status    ABO AB  Final    RH(D) Pos  Final    Antibody Screen Neg  Final    Test Valid Only At [facility]     Final    Specimen Expires 07/07/2018  Final         7/4/2018  1:43 AM      Component Results     Component Value Ref Range & Units Status     HCG Quantitative Serum 25625 (H) 0 - 5 IU/L Final         7/4/2018  1:43 AM      Component Results     Component Value Ref Range & Units Status    Lipase 162 73 - 393 U/L Final         7/4/2018  1:43 AM      Component Results     Component Value Ref Range & Units Status    Sodium 138 133 - 144 mmol/L Final    Potassium 4.0 3.4 - 5.3 mmol/L Final    Specimen slightly hemolyzed, potassium may be falsely elevated    Chloride 108 94 - 109 mmol/L Final    Carbon Dioxide 20 20 - 32 mmol/L Final    Anion Gap 10 3 - 14 mmol/L Final    Glucose 87 70 - 99 mg/dL Final    Urea Nitrogen 10 7 - 30 mg/dL Final    Creatinine 0.62 0.52 - 1.04 mg/dL Final    GFR Estimate >90 >60 mL/min/1.7m2 Final    Non  GFR Calc    GFR Estimate If Black >90 >60 mL/min/1.7m2 Final    African American GFR Calc    Calcium 8.8 8.5 - 10.1 mg/dL Final    Bilirubin Total 0.6 0.2 - 1.3 mg/dL Final    Albumin 3.4 3.4 - 5.0 g/dL Final    Protein Total 7.7 6.8 - 8.8 g/dL Final    Alkaline Phosphatase 50 40 - 150 U/L Final    ALT 19 0 - 50 U/L Final    AST 24 0 - 45 U/L Final    Specimen is hemolyzed which can falsely elevate AST. Analysis of a   non-hemolyzed specimen may result in a lower value.                 7/4/2018  1:58 AM      Narrative     ULTRASOUND OBSTETRIC LESS THAN 14 WEEKS 7/4/2018 1:43 AM    HISTORY: spotting in pregnancy;     TECHNIQUE: Transabdominal  imaging were performed.      COMPARISON:  6/3/2018 OB ultrasound.    FINDINGS:    Estimated gestational age by current ultrasound measurement: 11 weeks  4 days.  Estimated date of delivery based on this ultrasound: 1/19/2019.  Crown-rump length: 4.7 cm.   Embryonic cardiac activity: 140 bpm.   Yolk sac: Present.  Subchorionic hemorrhage: None.    Right ovary: Unremarkable.  Left ovary: Not visualized.  Adnexal mass: None.  Free pelvic fluid: None.        Impression     IMPRESSION: Single live intrauterine pregnancy with estimated  gestational age by current measurement of 11  weeks and 4 days  corresponding to estimated due date of 1/19/2019.  (The reported estimated due date is 1/18/2019. Estimated due date by  6/3/2018 ultrasound is 1/21/2019.)    CIARA BUENROSTRO MD                Clinical Quality Measure: Blood Pressure Screening     Your blood pressure was checked while you were in the emergency department today. The last reading we obtained was  BP: 93/58 . Please read the guidelines below about what these numbers mean and what you should do about them.  If your systolic blood pressure (the top number) is less than 120 and your diastolic blood pressure (the bottom number) is less than 80, then your blood pressure is normal. There is nothing more that you need to do about it.  If your systolic blood pressure (the top number) is 120-139 or your diastolic blood pressure (the bottom number) is 80-89, your blood pressure may be higher than it should be. You should have your blood pressure rechecked within a year by a primary care provider.  If your systolic blood pressure (the top number) is 140 or greater or your diastolic blood pressure (the bottom number) is 90 or greater, you may have high blood pressure. High blood pressure is treatable, but if left untreated over time it can put you at risk for heart attack, stroke, or kidney failure. You should have your blood pressure rechecked by a primary care provider within the next 4 weeks.  If your provider in the emergency department today gave you specific instructions to follow-up with your doctor or provider even sooner than that, you should follow that instruction and not wait for up to 4 weeks for your follow-up visit.        Thank you for choosing [facility]       Thank you for choosing [facility] for your care. Our goal is always to provide you with excellent care. Hearing back from our patients is one way we can continue to improve our services. Please take a few minutes to complete the written survey that you may receive in the mail  "after you visit with us. Thank you!        [employer] Information     [employer] lets you send messages to your doctor, view your test results, renew your prescriptions, schedule appointments and more. To sign up, go to [email or web address] . Click on \"Log in\" on the left side of the screen, which will take you to the Welcome page. Then click on \"Sign up Now\" on the right side of the page.     You will be asked to enter the access code listed below, as well as some personal information. Please follow the directions to create your username and password.     Your access code is: [email or web address]  Expires: 2018 10:06 AM     Your access code will  in 90 days. If you need help or a new code, please call your [facility] clinic or [phone].        Care EveryWhere ID     This is your Care EveryWhere ID. This could be used by other organizations to access your [facility] medical records  [id number]        Equal Access to Services     [facility] : Hadii dana hester hadasho Soveronicaali, waaxda luqadaha, qaybta kaalmada adeegyamelanie, av tony . So [facility] [phone].    ATENCIÓN: Si habla español, tiene a lopez disposición servicios gratuitos de asistencia lingüística. Llame al [phone].    We comply with applicable federal civil rights laws and [place] laws. We do not discriminate on the basis of race, color, national origin, age, disability, sex, sexual orientation, or gender identity.            After Visit Summary       This is your record. Keep this with you and show to your community pharmacist(s) and doctor(s) at your next visit.                  "

## 2018-07-04 NOTE — ED AVS SNAPSHOT
Emergency Department    [address] [place] [address]    Phone:  [phone]    Fax:  [phone]                                       Akua Gr   MRN: [id number]    Department:   Emergency Department   Date of Visit:  7/3/2018           After Visit Summary Signature Page     I have received my discharge instructions, and my questions have been answered. I have discussed any challenges I see with this plan with the nurse or doctor.    ..........................................................................................................................................  Patient/Patient Representative Signature      ..........................................................................................................................................  Patient Representative Print Name and Relationship to Patient    ..................................................               ................................................  Date                                            Time    ..........................................................................................................................................  Reviewed by Signature/Title    ...................................................              ..............................................  Date                                                            Time

## 2018-07-06 NOTE — ED NOTES
Patient calling about prescription.  Verified it was e-scribed at [facility], reports [facility] does not have it. Called [facility]'s to give prescription.

## 2018-07-06 NOTE — ED NOTES
Spoke with pharmacist at [facility] and reports did get the e-scribe script yesterday.  Medication is not covered by insurance so pharmacy will call patient and notify her.

## 2018-08-12 ENCOUNTER — HOSPITAL ENCOUNTER (EMERGENCY)
Facility: CLINIC | Age: 33
Discharge: HOME | End: 2018-08-12
Attending: EMERGENCY MEDICINE | Admitting: EMERGENCY MEDICINE
Payer: COMMERCIAL

## 2018-08-12 VITALS
BODY MASS INDEX: 26.31 KG/M2 | SYSTOLIC BLOOD PRESSURE: 93 MMHG | OXYGEN SATURATION: 100 % | HEIGHT: 62 IN | DIASTOLIC BLOOD PRESSURE: 52 MMHG | RESPIRATION RATE: 16 BRPM | TEMPERATURE: 98.5 F | WEIGHT: 143 LBS

## 2018-08-12 DIAGNOSIS — B96.89 BV (BACTERIAL VAGINOSIS): ICD-10-CM

## 2018-08-12 DIAGNOSIS — N76.0 BV (BACTERIAL VAGINOSIS): ICD-10-CM

## 2018-08-12 DIAGNOSIS — O21.9 NAUSEA/VOMITING IN PREGNANCY: ICD-10-CM

## 2018-08-12 DIAGNOSIS — R55 VASOVAGAL SYNCOPE: ICD-10-CM

## 2018-08-12 LAB
ABO + RH BLD: NORMAL
ABO + RH BLD: NORMAL
ANION GAP SERPL CALCULATED.3IONS-SCNC: 9 MMOL/L (ref 3–14)
BASOPHILS # BLD AUTO: 0 10E9/L (ref 0–0.2)
BASOPHILS NFR BLD AUTO: 0.2 %
BLOOD BANK CMNT PATIENT-IMP: NORMAL
BLOOD BANK CMNT PATIENT-IMP: NORMAL
BUN SERPL-MCNC: 11 MG/DL (ref 7–30)
CALCIUM SERPL-MCNC: 8.7 MG/DL (ref 8.5–10.1)
CHLORIDE SERPL-SCNC: 108 MMOL/L (ref 94–109)
CO2 SERPL-SCNC: 21 MMOL/L (ref 20–32)
CREAT SERPL-MCNC: 0.68 MG/DL (ref 0.52–1.04)
DIFFERENTIAL METHOD BLD: ABNORMAL
EOSINOPHIL # BLD AUTO: 0.2 10E9/L (ref 0–0.7)
EOSINOPHIL NFR BLD AUTO: 1.2 %
ERYTHROCYTE [DISTWIDTH] IN BLOOD BY AUTOMATED COUNT: 13.4 % (ref 10–15)
FETAL CELL SCN BLD QL ROSETTE: NORMAL
GFR SERPL CREATININE-BSD FRML MDRD: >90 ML/MIN/1.7M2
GLUCOSE SERPL-MCNC: 75 MG/DL (ref 70–99)
HCT VFR BLD AUTO: 30.3 % (ref 35–47)
HGB BLD-MCNC: 10.7 G/DL (ref 11.7–15.7)
IMM GRANULOCYTES # BLD: 0.1 10E9/L (ref 0–0.4)
IMM GRANULOCYTES NFR BLD: 0.4 %
INTERPRETATION ECG - MUSE: NORMAL
LYMPHOCYTES # BLD AUTO: 3 10E9/L (ref 0.8–5.3)
LYMPHOCYTES NFR BLD AUTO: 22.3 %
MCH RBC QN AUTO: 31.8 PG (ref 26.5–33)
MCHC RBC AUTO-ENTMCNC: 35.3 G/DL (ref 31.5–36.5)
MCV RBC AUTO: 90 FL (ref 78–100)
MONOCYTES # BLD AUTO: 0.6 10E9/L (ref 0–1.3)
MONOCYTES NFR BLD AUTO: 4.6 %
NEUTROPHILS # BLD AUTO: 9.7 10E9/L (ref 1.6–8.3)
NEUTROPHILS NFR BLD AUTO: 71.3 %
NRBC # BLD AUTO: 0 10*3/UL
NRBC BLD AUTO-RTO: 0 /100
PLATELET # BLD AUTO: 247 10E9/L (ref 150–450)
POTASSIUM SERPL-SCNC: 3.8 MMOL/L (ref 3.4–5.3)
RBC # BLD AUTO: 3.36 10E12/L (ref 3.8–5.2)
RH IG VIALS RECOM PATIENT: NORMAL
SODIUM SERPL-SCNC: 138 MMOL/L (ref 133–144)
SPECIMEN SOURCE: ABNORMAL
WBC # BLD AUTO: 13.6 10E9/L (ref 4–11)
WET PREP SPEC: ABNORMAL

## 2018-08-12 PROCEDURE — 99285 EMERGENCY DEPT VISIT HI MDM: CPT | Mod: 25

## 2018-08-12 PROCEDURE — 76815 OB US LIMITED FETUS(S): CPT

## 2018-08-12 PROCEDURE — 85461 HEMOGLOBIN FETAL: CPT | Performed by: EMERGENCY MEDICINE

## 2018-08-12 PROCEDURE — 93005 ELECTROCARDIOGRAM TRACING: CPT

## 2018-08-12 PROCEDURE — 86901 BLOOD TYPING SEROLOGIC RH(D): CPT | Performed by: EMERGENCY MEDICINE

## 2018-08-12 PROCEDURE — 80048 BASIC METABOLIC PNL TOTAL CA: CPT | Performed by: EMERGENCY MEDICINE

## 2018-08-12 PROCEDURE — 96360 HYDRATION IV INFUSION INIT: CPT

## 2018-08-12 PROCEDURE — 87491 CHLMYD TRACH DNA AMP PROBE: CPT | Performed by: EMERGENCY MEDICINE

## 2018-08-12 PROCEDURE — 85025 COMPLETE CBC W/AUTO DIFF WBC: CPT | Performed by: EMERGENCY MEDICINE

## 2018-08-12 PROCEDURE — 87210 SMEAR WET MOUNT SALINE/INK: CPT | Performed by: EMERGENCY MEDICINE

## 2018-08-12 PROCEDURE — 87591 N.GONORRHOEAE DNA AMP PROB: CPT | Performed by: EMERGENCY MEDICINE

## 2018-08-12 PROCEDURE — 86900 BLOOD TYPING SEROLOGIC ABO: CPT | Performed by: EMERGENCY MEDICINE

## 2018-08-12 PROCEDURE — 25000128 H RX IP 250 OP 636: Performed by: EMERGENCY MEDICINE

## 2018-08-12 PROCEDURE — 25000132 ZZH RX MED GY IP 250 OP 250 PS 637: Performed by: EMERGENCY MEDICINE

## 2018-08-12 RX ORDER — PYRIDOXINE HCL (VITAMIN B6) 25 MG
25 TABLET ORAL EVERY 8 HOURS PRN
Qty: 30 TABLET | Refills: 0 | Status: ON HOLD | OUTPATIENT
Start: 2018-08-12 | End: 2019-01-16

## 2018-08-12 RX ORDER — ONDANSETRON 4 MG/1
4 TABLET, ORALLY DISINTEGRATING ORAL EVERY 6 HOURS PRN
Qty: 10 TABLET | Refills: 0 | Status: SHIPPED | OUTPATIENT
Start: 2018-08-12 | End: 2018-08-15

## 2018-08-12 RX ORDER — METRONIDAZOLE 500 MG/1
500 TABLET ORAL 2 TIMES DAILY
Qty: 14 TABLET | Refills: 0 | Status: SHIPPED | OUTPATIENT
Start: 2018-08-12 | End: 2018-08-19

## 2018-08-12 RX ORDER — ACETAMINOPHEN 500 MG
1000 TABLET ORAL ONCE
Status: COMPLETED | OUTPATIENT
Start: 2018-08-12 | End: 2018-08-12

## 2018-08-12 RX ORDER — SWAB
1 SWAB, NON-MEDICATED MISCELLANEOUS DAILY
Qty: 30 EACH | Refills: 0 | Status: SHIPPED | OUTPATIENT
Start: 2018-08-12

## 2018-08-12 RX ADMIN — SODIUM CHLORIDE 1000 ML: 9 INJECTION, SOLUTION INTRAVENOUS at 15:52

## 2018-08-12 RX ADMIN — ACETAMINOPHEN 1000 MG: 500 TABLET, FILM COATED ORAL at 15:54

## 2018-08-12 ASSESSMENT — ENCOUNTER SYMPTOMS
NAUSEA: 1
ABDOMINAL PAIN: 1
APPETITE CHANGE: 1
LIGHT-HEADEDNESS: 1
DIARRHEA: 0
VOMITING: 1
FEVER: 0

## 2018-08-12 NOTE — ED NOTES
Bed: ED24  Expected date:   Expected time:   Means of arrival:   Comments:  Triage - hematemesis

## 2018-08-12 NOTE — ED PROVIDER NOTES
History     Chief Complaint:  Loss of Consciousness       HPI   Akua Gr is a A2 33 year old female who is currently 17 weeks pregnant presents to the emergency department today for evaluation of loss of consciousness. Today, the patient was in her kitchen cleaning when her boyfriend found her unconscious. She was able to stand up and ambulate however does not remember losing consciousness.  Here, she is not sure how long she was down for but notes it was less than a few minutes. She notes she opened the oven, felt a warm rush of air, then had the syncopal episode. She believes she fell to her right side as her right arm feels sore. She has had limited oral intake secondary to persistent nausea and vomiting despite B6 use. She has been feeling lightheaded for some time as well and complaints of that here in the ED. In addition, she also complains of mild vaginal spotting with mild vaginal and suprapubic discomfort which began after her last C section. Otherwise no history of syncope. No family history of sudden cardiac death. No fever or diarrhea. She is a smoker but no alcohol use. She has her first OB appointment this coming week. LMP . No other concerns voiced at this time.     Allergies:  Penicillins    Medications:    Doxylamine-Pyridoxine 10-10 MG TBEC  Albuterol      Past Medical History:    History reviewed. No pertinent past medical history.    Past Surgical History:    C section     Family History:    History reviewed. No pertinent family history.     Social History:  The patient was accompanied to the ED by self.  Smoking Status: Current every day smoker  Smokeless Tobacco: Never used  Alcohol Use: No  Marital Status:  Single [1]     Review of Systems   Constitutional: Positive for appetite change. Negative for fever.   Gastrointestinal: Positive for abdominal pain, nausea and vomiting. Negative for diarrhea.   Genitourinary: Positive for vaginal bleeding and vaginal pain.   Neurological:  "Positive for syncope and light-headedness.   All other systems reviewed and are negative.    Physical Exam   First Vitals:  BP: 95/61  Heart Rate: 73  Temp: 98.5  F (36.9  C)  Resp: 18  Height: 157.5 cm (5' 2\")  Weight: 64.9 kg (143 lb)  SpO2: 98 %    Physical Exam  General: Alert and cooperative with exam. Patient in no distress. Normal mentation.  Head:  Scalp is NC/AT  Eyes:  No scleral icterus, PERRL  ENT:  The external nose and ears are normal. The oropharynx is normal and without erythema; mucus membranes are moist. Uvula midline, no evidence of deep space infection.  Neck:  Normal range of motion without rigidity.  CV:  Regular rate and rhythm    No pathologic murmur   Resp:  Breath sounds are clear bilaterally    Non-labored, no retractions or accessory muscle use  GI:  Abdomen is soft, no distension, no tenderness. No peritoneal signs. Gravid uterus consistent with 17 weeks.   :   Normal external female genitalia     No CMT on exam    No pain on exam    No vaginal bleeding    Moderate vaginal mucous   MS:  No lower extremity edema   Skin:  Warm and dry, No rash or lesions noted.  Neuro: Oriented x 3. No gross motor deficits.    Emergency Department Course     ECG:  ECG taken at 1548, ECG read at 1610  Normal sinus rhythm with sinus arrhythmia  Normal ECG  Rate 65 bpm. ND interval 178. QRS duration 82. QT/QTc 382/397. P-R-T axes 39 56 32.    Imaging:  Radiology findings were communicated with the patient who voiced understanding of the findings.    POC US:  Limited Bedside Transabdominal ultrasound for evaluation of IUP  Performed any interpreted by Yury Del Castillo DO  Indication:  vaginal bleeding and pelvic pain  Findings:The lower abdomen was interrogated with a curvilinear probe. The uterus was identified.   Within the uterus there is a moving fetus with visible heart rate with FHR of 124  Impression: Intrauterine pregnancy without evidence of abdominal free fluid or other concerning " findings    Laboratory:  Laboratory findings were communicated with the patient who voiced understanding of the findings.    Wet prep: Positive for clue cells    Chlamydia trachomatis: Pending  Neisseria gonorrhea: Pending    Rh immune globin study: AB Pos    basic metabolic panel: AWNL (Creatinine: 0.68)    CBC: WBC: 13.6(H), HGB: 10.7(L) o/w WNL. ()     Interventions:  1552- NS 1000 mL IV  1554- Tylenol 1000 mg Oral      Emergency Department Course:  Nursing notes and vitals reviewed.  I performed an exam of the patient as documented above.     IV was inserted and blood was drawn for laboratory testing, results above.    I discussed the treatment plan with the patient. They expressed understanding of this plan and consented to discharge.     Impression & Plan      Medical Decision Making:  Patient is a 33-year-old female who presents following a brief syncopal episode; 17 weeks pregnant by last menstrual.  Patient's medical history and records reviewed.  Initial consideration for, not limited to, vasovagal syncope, orthostatic hypotension, electrolyte abnormality, pregnancy complication, anemia, arrhythmia, among others.  Labs, EKG, and imaging was obtained.  Bedside ultrasound demonstrates normal intrauterine pregnancy with normal fetal heart rate; no abdominal tenderness on exam or history of significant vaginal bleeding/discharge.  Pelvic exam was performed demonstrating no evidence of vaginal bleeding though did show mild cervical mucus.  Patient denies concern for STI; testing was obtained and pending.  Wet prep did return positive for clue cells concerning for BV; prescribed Flagyl.  Labs notable for mildly elevated white count (13.6; no history or evidence of infectious source; likely stress reaction).  Patient also noted to have mild/chronic anemia (hemoglobin 10.7).  She was provided Tylenol for mild headache as well as IV fluids with noted improvement in symptoms.  Her neurologic exam was  unremarkable/nonfocal; no indication for advanced head imaging at this time.  Patient's description of syncopal episode is most consistent with a vasovagal syncope.  EKG demonstrated no significant findings.  No family history of sudden cardiac death.  Patient notes that she has been malnourished and dehydrated secondary to nausea and vomiting in pregnancy.  Prescribed Unisom and B6 as well as Zofran for breakthrough nausea. Prescribed prenatal vitamin.  Recommended close follow-up with her PCP in 3 days as scheduled.  Return precautions were discussed.  Patient discharged to home.  At the time of discharge patient was hemodynamically stable, neurologically intact, afebrile, tolerating oral intake, and asymptomatic.     Diagnosis:    ICD-10-CM    1. Vasovagal syncope R55    2. BV (bacterial vaginosis) N76.0     B96.89    3. Nausea/vomiting in pregnancy O21.9        Disposition:   Findings and plan explained to the Patient. Patient discharged home with instructions regarding supportive care, medications, and reasons to return. The importance of close follow-up was reviewed.     Discharge Medications:  Discharge Medication List as of 8/12/2018  6:08 PM      START taking these medications    Details   doxylamine (UNISOM) 25 MG TABS tablet Take 1 tablet (25 mg) by mouth every 8 hours as needed For nausea, Disp-30 each, R-0, Local Print      metroNIDAZOLE (FLAGYL) 500 MG tablet Take 1 tablet (500 mg) by mouth 2 times daily for 7 days, Disp-14 tablet, R-0, Local PrintEat yogurt or cottage cheese daily to prevent diarrhea that can be caused by taking this medication.      ondansetron (ZOFRAN ODT) 4 MG ODT tab Take 1 tablet (4 mg) by mouth every 6 hours as needed for nausea, Disp-10 tablet, R-0, Local Print      Prenatal Vit-Fe Fumarate-FA (PRENATAL MULTIVITAMIN  WITH IRON) 28-0.8 MG TABS Take 1 tablet by mouth daily, Disp-30 each, R-0, Local Print      pyridOXINE (VITAMIN B-6) 25 MG tablet Take 1 tablet (25 mg) by mouth  every 8 hours as needed For nausea, Disp-30 tablet, R-0, Local Print             Scribe Disclosure:  I, Veto Vega, am serving as a scribe at 3:29 PM on 8/12/2018 to document services personally performed by Yury Del Castillo DO, based on my observations and the provider's statements to me.    8/12/2018    EMERGENCY DEPARTMENT       Yury Del Castillo DO  08/13/18 0101

## 2018-08-12 NOTE — DISCHARGE INSTRUCTIONS
Return to the emergency department or seek medical care as instructed if your symptoms fail to improve or significantly worsen.    Take Acetaminophen (aka Tylenol) as needed for symptom/pain relief; use as directed.    Take Unisom and B6 together or Zofran as need for nausea; use as directed.    Take antibiotics as prescribed; complete entire course as directed.    Follow-up as indicated on page 1.  Maintain adequate hydration and get plenty of rest.

## 2018-08-12 NOTE — ED AVS SNAPSHOT
Emergency Department    [address] [facility] [address]    Phone:  [phone]    Fax:  [phone]                                       Akua Gr   MRN: [id number]    Department:   Emergency Department   Date of Visit:  8/12/2018           Patient Information     Date Of Birth          1985        Your diagnoses for this visit were:     Vasovagal syncope     BV (bacterial vaginosis)     Nausea/vomiting in pregnancy        You were seen by Yury Del Castillo DO.      Follow-up Information     Follow up with [facility] In 3 days.    Specialty:  Clinic    Why:  As scheduled    Contact information:    [address] [facility] [address]  [facility] [address] [phone]          Follow up with  Emergency Department.    Specialty:  EMERGENCY MEDICINE    Why:  If symptoms worsen    Contact information:    [address] [facility] [id number] [phone]        Discharge Instructions       Return to the emergency department or seek medical care as instructed if your symptoms fail to improve or significantly worsen.    Take Acetaminophen (aka Tylenol) as needed for symptom/pain relief; use as directed.    Take Unisom and B6 together or Zofran as need for nausea; use as directed.    Take antibiotics as prescribed; complete entire course as directed.    Follow-up as indicated on page 1.  Maintain adequate hydration and get plenty of rest.      Discharge References/Attachments     BACTERIAL VAGINOSIS (BV) (ENGLISH)    SYNCOPE, VASOVAGAL (ENGLISH)    VOMITING (6Y-ADULT) (ENGLISH)      Your next 10 appointments already scheduled     Aug 15, 2018 10:00 AM CDT   New Prenatal with RD OB NURSE EDUCATION   [facility]    [address] [facility] [address]  [facility] [address] [phone]              24 Hour Appointment Hotline       To make an appointment at any [facility], call [phone]  ([phone]). If you don't have a family doctor or clinic, we will help you find one. [facility] are conveniently located to serve the needs of you and your family.             Review of your medicines      START taking        Dose / Directions Last dose taken    doxylamine 25 MG Tabs tablet   Commonly known as:  UNISOM   Dose:  25 mg   Quantity:  30 each        Take 1 tablet (25 mg) by mouth every 8 hours as needed For nausea   Refills:  0        metroNIDAZOLE 500 MG tablet   Commonly known as:  FLAGYL   Dose:  500 mg   Quantity:  14 tablet        Take 1 tablet (500 mg) by mouth 2 times daily for 7 days   Refills:  0        ondansetron 4 MG ODT tab   Commonly known as:  ZOFRAN ODT   Dose:  4 mg   Quantity:  10 tablet        Take 1 tablet (4 mg) by mouth every 6 hours as needed for nausea   Refills:  0        prenatal multivitamin  with iron 28-0.8 MG Tabs   Dose:  1 tablet   Quantity:  30 each        Take 1 tablet by mouth daily   Refills:  0        pyridOXINE 25 MG tablet   Commonly known as:  vitamin B-6   Dose:  25 mg   Quantity:  30 tablet        Take 1 tablet (25 mg) by mouth every 8 hours as needed For nausea   Refills:  0          Our records show that you are taking the medicines listed below. If these are incorrect, please call your family doctor or clinic.        Dose / Directions Last dose taken    albuterol 108 (90 Base) MCG/ACT inhaler   Commonly known as:  PROAIR HFA/PROVENTIL HFA/VENTOLIN HFA   Dose:  2 puff   Quantity:  1 Inhaler        Inhale 2 puffs into the lungs every 6 hours as needed for shortness of breath / dyspnea or wheezing   Refills:  0        * Doxylamine-Pyridoxine 10-10 MG Tbec   Dose:  10 mg   Quantity:  30 tablet        Take 10 mg by mouth At Bedtime   Refills:  0        * Doxylamine-Pyridoxine 10-10 MG Tbec   Dose:  10 mg   Quantity:  30 tablet        Take 10 mg by mouth At Bedtime   Refills:  0        * Doxylamine-Pyridoxine 10-10 MG Tbec   Dose:  10 mg   Quantity:  30  tablet        Take 10 mg by mouth At Bedtime   Refills:  0        * Doxylamine-Pyridoxine 10-10 MG Tbec   Dose:  10 mg   Quantity:  30 tablet        Take 10 mg by mouth At Bedtime   Refills:  0        * Notice:  This list has 4 medication(s) that are the same as other medications prescribed for you. Read the directions carefully, and ask your doctor or other care provider to review them with you.            Prescriptions were sent or printed at these locations (5 Prescriptions)                   Other Prescriptions                Printed at Department/Unit printer (5 of 5)         doxylamine (UNISOM) 25 MG TABS tablet               metroNIDAZOLE (FLAGYL) 500 MG tablet               ondansetron (ZOFRAN ODT) 4 MG ODT tab               Prenatal Vit-Fe Fumarate-FA (PRENATAL MULTIVITAMIN  WITH IRON) 28-0.8 MG TABS               pyridOXINE (VITAMIN B-6) 25 MG tablet                Procedures and tests performed during your visit     Basic metabolic panel    CBC with platelets differential    Chlamydia trachomatis PCR    EKG 12 lead    Neisseria gonorrhoea PCR    POC US OB TRANSABDOMINAL LIMITED    Rh Immune Globulin Study    Rho (D) immune globulin (RhoGam) Lab Study    Wet prep      Orders Needing Specimen Collection     None      Pending Results     Date and Time Order Name Status Description    8/12/2018 1548 Neisseria gonorrhoea PCR In process     8/12/2018 1548 Chlamydia trachomatis PCR In process             Pending Culture Results     Date and Time Order Name Status Description    8/12/2018 1548 Neisseria gonorrhoea PCR In process     8/12/2018 1548 Chlamydia trachomatis PCR In process             Pending Results Instructions     If you had any lab results that were not finalized at the time of your Discharge, you can call the ED Lab Result RN at [phone]. You will be contacted by this team for any positive Lab results or changes in treatment. The nurses are available 7 days a week from 10A to 6:30P.  You can  leave a message 24 hours per day and they will return your call.        Test Results From Your Hospital Stay        8/12/2018  4:40 PM      Component Results     Component    Specimen Description    Vagina    Wet Prep    No Trichomonas seen    Wet Prep    No yeast seen    Wet Prep (Abnormal)    Clue cells seen    Wet Prep    Few  PMNs seen           8/12/2018  4:34 PM         8/12/2018  4:34 PM         8/12/2018  4:09 PM      Component Results     Component Value Ref Range & Units Status    Sodium 138 133 - 144 mmol/L Final    Potassium 3.8 3.4 - 5.3 mmol/L Final    Chloride 108 94 - 109 mmol/L Final    Carbon Dioxide 21 20 - 32 mmol/L Final    Anion Gap 9 3 - 14 mmol/L Final    Glucose 75 70 - 99 mg/dL Final    Urea Nitrogen 11 7 - 30 mg/dL Final    Creatinine 0.68 0.52 - 1.04 mg/dL Final    GFR Estimate >90 >60 mL/min/1.7m2 Final    Non  GFR Calc    GFR Estimate If Black >90 >60 mL/min/1.7m2 Final    African American GFR Calc    Calcium 8.7 8.5 - 10.1 mg/dL Final         8/12/2018  3:58 PM      Component Results     Component Value Ref Range & Units Status    WBC 13.6 (H) 4.0 - 11.0 10e9/L Final    RBC Count 3.36 (L) 3.8 - 5.2 10e12/L Final    Hemoglobin 10.7 (L) 11.7 - 15.7 g/dL Final    Hematocrit 30.3 (L) 35.0 - 47.0 % Final    MCV 90 78 - 100 fl Final    MCH 31.8 26.5 - 33.0 pg Final    MCHC 35.3 31.5 - 36.5 g/dL Final    RDW 13.4 10.0 - 15.0 % Final    Platelet Count 247 150 - 450 10e9/L Final    Diff Method Automated Method  Final    % Neutrophils 71.3 % Final    % Lymphocytes 22.3 % Final    % Monocytes 4.6 % Final    % Eosinophils 1.2 % Final    % Basophils 0.2 % Final    % Immature Granulocytes 0.4 % Final    Nucleated RBCs 0 0 /100 Final    Absolute Neutrophil 9.7 (H) 1.6 - 8.3 10e9/L Final    Absolute Lymphocytes 3.0 0.8 - 5.3 10e9/L Final    Absolute Monocytes 0.6 0.0 - 1.3 10e9/L Final    Absolute Eosinophils 0.2 0.0 - 0.7 10e9/L Final    Absolute Basophils 0.0 0.0 - 0.2 10e9/L Final     Abs Immature Granulocytes 0.1 0 - 0.4 10e9/L Final    Absolute Nucleated RBC 0.0  Final         8/12/2018  3:51 PM      Component Results     Component    Rhogam Order    Order received    Comment:    See Rhogam Study/Suitability         8/12/2018  4:18 PM      Impression     Limited Bedside Transabdominal ultrasound for evaluation of IUP        Performed any interpreted by Yury Del Castillo DO    Indication:  vaginal bleeding and pelvic pain  Findings:  The lower abdomen was interrogated with a curvilinear probe. The uterus was identified.   Within the uterus there is a moving fetus with visible heart rate with FHR of 124    Impression: Intrauterine pregnancy without evidence of abdominal free fluid or other concerning findings           8/12/2018  4:29 PM      Component Results     Component    ABO    AB    RH(D)    Pos    Fetal Blood Screen    Canceled, Test credited    Blood Bank Comment    Not suitable for Rh Immune Globulin    Amount of RHIG Required    Not suitable for Rh Immune Globulin                Clinical Quality Measure: Blood Pressure Screening     Your blood pressure was checked while you were in the emergency department today. The last reading we obtained was  BP: 93/52 . Please read the guidelines below about what these numbers mean and what you should do about them.  If your systolic blood pressure (the top number) is less than 120 and your diastolic blood pressure (the bottom number) is less than 80, then your blood pressure is normal. There is nothing more that you need to do about it.  If your systolic blood pressure (the top number) is 120-139 or your diastolic blood pressure (the bottom number) is 80-89, your blood pressure may be higher than it should be. You should have your blood pressure rechecked within a year by a primary care provider.  If your systolic blood pressure (the top number) is 140 or greater or your diastolic blood pressure (the bottom number) is 90 or greater, you  "may have high blood pressure. High blood pressure is treatable, but if left untreated over time it can put you at risk for heart attack, stroke, or kidney failure. You should have your blood pressure rechecked by a primary care provider within the next 4 weeks.  If your provider in the emergency department today gave you specific instructions to follow-up with your doctor or provider even sooner than that, you should follow that instruction and not wait for up to 4 weeks for your follow-up visit.        Thank you for choosing [facility]       Thank you for choosing [facility] for your care. Our goal is always to provide you with excellent care. Hearing back from our patients is one way we can continue to improve our services. Please take a few minutes to complete the written survey that you may receive in the mail after you visit with us. Thank you!        [facility] Information     [facility] lets you send messages to your doctor, view your test results, renew your prescriptions, schedule appointments and more. To sign up, go to [email or web address] . Click on \"Log in\" on the left side of the screen, which will take you to the Welcome page. Then click on \"Sign up Now\" on the right side of the page.     You will be asked to enter the access code listed below, as well as some personal information. Please follow the directions to create your username and password.     Your access code is: [email or web address]  Expires: 2018 10:06 AM     Your access code will  in 90 days. If you need help or a new code, please call your [facility] clinic or [phone].        Care EveryWhere ID     This is your Care EveryWhere ID. This could be used by other organizations to access your [facility] medical records  [id number]        Equal Access to Services     CASSANDRA MCNEAL : daniel Martin qaybta kaalmada adeegyada, waxay idiin hayaan adeeg kharash la'aan ah. So [facility] [phone].    ATENCIÓN: Si habla " español, tiene a lopez disposición servicios gratuitos de asistencia lingüística. Brianda al [phone].    We comply with applicable federal civil rights laws and [place] laws. We do not discriminate on the basis of race, color, national origin, age, disability, sex, sexual orientation, or gender identity.            After Visit Summary       This is your record. Keep this with you and show to your community pharmacist(s) and doctor(s) at your next visit.

## 2018-08-12 NOTE — ED AVS SNAPSHOT
Emergency Department    [address] [place] [address]    Phone:  [phone]    Fax:  [phone]                                       Akua Gr   MRN: [id number]    Department:   Emergency Department   Date of Visit:  8/12/2018           After Visit Summary Signature Page     I have received my discharge instructions, and my questions have been answered. I have discussed any challenges I see with this plan with the nurse or doctor.    ..........................................................................................................................................  Patient/Patient Representative Signature      ..........................................................................................................................................  Patient Representative Print Name and Relationship to Patient    ..................................................               ................................................  Date                                            Time    ..........................................................................................................................................  Reviewed by Signature/Title    ...................................................              ..............................................  Date                                                            Time

## 2018-08-13 LAB
C TRACH DNA SPEC QL NAA+PROBE: NEGATIVE
N GONORRHOEA DNA SPEC QL NAA+PROBE: NEGATIVE
SPECIMEN SOURCE: NORMAL
SPECIMEN SOURCE: NORMAL

## 2018-08-15 ENCOUNTER — PRENATAL OFFICE VISIT (OUTPATIENT)
Dept: NURSING | Facility: CLINIC | Age: 33
End: 2018-08-15
Payer: COMMERCIAL

## 2018-08-15 VITALS
WEIGHT: 141.3 LBS | HEIGHT: 62 IN | DIASTOLIC BLOOD PRESSURE: 54 MMHG | SYSTOLIC BLOOD PRESSURE: 104 MMHG | TEMPERATURE: 98 F | BODY MASS INDEX: 26 KG/M2 | HEART RATE: 98 BPM

## 2018-08-15 DIAGNOSIS — R30.0 DYSURIA: ICD-10-CM

## 2018-08-15 DIAGNOSIS — O09.90 HIGH-RISK PREGNANCY: Primary | ICD-10-CM

## 2018-08-15 DIAGNOSIS — Z86.2 H/O SICKLE CELL TRAIT: ICD-10-CM

## 2018-08-15 DIAGNOSIS — R87.619 ABNORMAL PAP SMEAR OF CERVIX: ICD-10-CM

## 2018-08-15 DIAGNOSIS — Z23 NEED FOR TDAP VACCINATION: ICD-10-CM

## 2018-08-15 LAB
ABO + RH BLD: NORMAL
ABO + RH BLD: NORMAL
ALBUMIN UR-MCNC: NEGATIVE MG/DL
APPEARANCE UR: CLEAR
BILIRUB UR QL STRIP: NEGATIVE
BLD GP AB SCN SERPL QL: NORMAL
BLOOD BANK CMNT PATIENT-IMP: NORMAL
COLOR UR AUTO: YELLOW
ERYTHROCYTE [DISTWIDTH] IN BLOOD BY AUTOMATED COUNT: 13.7 % (ref 10–15)
GLUCOSE UR STRIP-MCNC: NEGATIVE MG/DL
HCT VFR BLD AUTO: 32.2 % (ref 35–47)
HGB BLD-MCNC: 10.8 G/DL (ref 11.7–15.7)
HGB UR QL STRIP: NEGATIVE
KETONES UR STRIP-MCNC: NEGATIVE MG/DL
LEUKOCYTE ESTERASE UR QL STRIP: NEGATIVE
MCH RBC QN AUTO: 31.1 PG (ref 26.5–33)
MCHC RBC AUTO-ENTMCNC: 33.5 G/DL (ref 31.5–36.5)
MCV RBC AUTO: 93 FL (ref 78–100)
NITRATE UR QL: NEGATIVE
PH UR STRIP: 6 PH (ref 5–7)
PLATELET # BLD AUTO: 266 10E9/L (ref 150–450)
RBC # BLD AUTO: 3.47 10E12/L (ref 3.8–5.2)
SOURCE: NORMAL
SP GR UR STRIP: 1.02 (ref 1–1.03)
SPECIMEN EXP DATE BLD: NORMAL
UROBILINOGEN UR STRIP-ACNC: 0.2 EU/DL (ref 0.2–1)
WBC # BLD AUTO: 12.8 10E9/L (ref 4–11)

## 2018-08-15 PROCEDURE — 87340 HEPATITIS B SURFACE AG IA: CPT | Performed by: OBSTETRICS & GYNECOLOGY

## 2018-08-15 PROCEDURE — 86900 BLOOD TYPING SEROLOGIC ABO: CPT | Performed by: OBSTETRICS & GYNECOLOGY

## 2018-08-15 PROCEDURE — 85027 COMPLETE CBC AUTOMATED: CPT | Performed by: OBSTETRICS & GYNECOLOGY

## 2018-08-15 PROCEDURE — 86780 TREPONEMA PALLIDUM: CPT | Performed by: OBSTETRICS & GYNECOLOGY

## 2018-08-15 PROCEDURE — 83021 HEMOGLOBIN CHROMOTOGRAPHY: CPT | Mod: 90 | Performed by: OBSTETRICS & GYNECOLOGY

## 2018-08-15 PROCEDURE — 87088 URINE BACTERIA CULTURE: CPT | Performed by: OBSTETRICS & GYNECOLOGY

## 2018-08-15 PROCEDURE — 36415 COLL VENOUS BLD VENIPUNCTURE: CPT | Performed by: OBSTETRICS & GYNECOLOGY

## 2018-08-15 PROCEDURE — 99000 SPECIMEN HANDLING OFFICE-LAB: CPT | Performed by: OBSTETRICS & GYNECOLOGY

## 2018-08-15 PROCEDURE — 86762 RUBELLA ANTIBODY: CPT | Performed by: OBSTETRICS & GYNECOLOGY

## 2018-08-15 PROCEDURE — 87086 URINE CULTURE/COLONY COUNT: CPT | Performed by: OBSTETRICS & GYNECOLOGY

## 2018-08-15 PROCEDURE — 87186 SC STD MICRODIL/AGAR DIL: CPT | Performed by: OBSTETRICS & GYNECOLOGY

## 2018-08-15 PROCEDURE — 86901 BLOOD TYPING SEROLOGIC RH(D): CPT | Performed by: OBSTETRICS & GYNECOLOGY

## 2018-08-15 PROCEDURE — 87389 HIV-1 AG W/HIV-1&-2 AB AG IA: CPT | Performed by: OBSTETRICS & GYNECOLOGY

## 2018-08-15 PROCEDURE — 99207 ZZC NO CHARGE NURSE ONLY: CPT

## 2018-08-15 PROCEDURE — 86850 RBC ANTIBODY SCREEN: CPT | Performed by: OBSTETRICS & GYNECOLOGY

## 2018-08-15 PROCEDURE — 81003 URINALYSIS AUTO W/O SCOPE: CPT | Performed by: OBSTETRICS & GYNECOLOGY

## 2018-08-15 PROCEDURE — 81511 FTL CGEN ABNOR FOUR ANAL: CPT | Mod: 90 | Performed by: OBSTETRICS & GYNECOLOGY

## 2018-08-15 NOTE — PROGRESS NOTES
Important Information for Provider:     Patient presents for new ob teaching and labs, ninth pregnancy, late care. History of 3 TAB's, C section with  twins at almost 27 weeks, female twin was breech(reason for C section) male twin dies at 5 months old, had heart surgery and  shortly after.   Patient had a ultrasound in the ED 18 which she was 11 weeks and 4 days then.  Had another ED visit 18 due to dehydration, fluids given. Discussed fluid intake, diet. Patient desires to have a midwife and wants to have . Has appointment with CNM 18. Quad screening drawn today with NOB labs    Prenatal OB Questionnaire  Patient supplied answers from flow sheet for:  Prenatal OB Questionnaire.  Past Medical History  Diabetes?: No  Hypertension : No  Heart disease, mitral valve prolapse or rheumatic fever?: No  An autoimmune disease such as lupus or rheumatoid arthritis?: No  Kidney disease or urinary tract infection?: No  Epilepsy, seizures or spells?: No  Migraine headaches?: No  A stroke or loss of function or sensation?: No  Any other neurological problems?: No  Have you ever been treated for depression?: No  Are you having problems with crying spells or loss of self-esteem?: No  Have you ever required psychiatric care?: No  Have you ever had hepatitis, liver disease or jaundice?: No  Have you been treated for blood clots in your veins, deep vein thromosis, inflammation in the veins, thrombosis, phlebitis, pulmonary embolism or varicosities?: No  Have you had excessive bleeding after surgery or dental work?: No  Do you bleed more than other women after a cut or scratch?: No  Do you have a history of anemia?: (!) Yes  Have you ever had thyroid problems or taken thyroid medication?: No   Do you have any endocrine problems?: No  Have you ever been in a major accident or suffered serious trauma?: No  Within the last year, has anyone hit, slapped, kicked or otherwise hurt you?: No  In the last  year, has anyone forced you to have sex when you didn't want to?: No    Past Medical History 2   Have you ever received a blood transfusion?: No  Would you refuse a blood transfusion if a doctor judged it to be medically necessary?: No   If you answered Yes, would you rather die than receive a blood transfusion?: No  If you answered Yes, is this for Mormonism reasons?: No  Does anyone in your home smoke?: (!) Yes  Do you use tobacco products?: (!) Yes  Do you drink beer, wine or hard liquor?: No  Do you use any of the following: marijuana, speed, cocaine, heroin, hallucinogens or other drugs?: No   Is your blood type Rh negative?: No  Have you ever had abnormal antibodies in your blood?: No  Have you ever had asthma?: No  Have you ever had tuberculosis?: No  Do you have any allergies to drugs or over-the-counter medications?: (!) Yes (PCN)  Allergies: Dust Mites, Aspartame, Ethanol, Venlafaxine, Hydrochloride, Sertraline: No  Have you had any breast problems?: No  Have you ever ?: (!) Yes  Have you had any gynecological surgical procedures such as cervical conization, a LEEP procedure, laser treatment, cryosurgery of the cervix or a dilation and curettage, etc?: (!) Yes (3 TAB)  Have you ever had any other surgical procedures?: (!) Yes (C section / twins 2013)  Have you been hospitalized for a nonsurgical reason excluding normal delivery?: No  Have you ever had any anesthetic complications?: No  Have you ever had an abnormal pap smear?: No    Past Medical History (Continued)  Do you have a history of abnormalities of the uterus?: No  Did your mother take DAYSI or any other hormones when she was pregnant with you?: No  Did it take you more than a year to become pregnant?: No  Have you ever been evaluated or treated for infertility?: No  Is there a history of medical problems in your family, which you feel may be important to this pregnancy?: No  Do you have any other problems we have not asked about which you  feel may be important to this pregnancy?: No    Symptoms since last menstrual period  Do you have any of the following symptoms: abdominal pain, blood in stools or urine, chest pain, shortness of breath, coughing or vomiting up blood, your heart racing or skipping beats, nausea and vomiting, pain on urination or vaginal discharge or bleed: (!) Yes (nausea)  Will the patient be 35 years old or older at the time of delivery?: No    Has the patient, baby's father or anyone in either family had:  Thalassemia (Italian, Greek, Mediterranean or  background only) and an MCV result less than 80?: No  Neural tube defect such as meningomyelocele, spina bifida or anencephaly?: No  Congenital heart defect?: (!) Yes (male twin / dies at 5 months after surgery )  Down's Syndrome?: (!) Yes (male twin)  Bashir-Sachs disease (Mu-ism, Cajun, Irish-[place] River)?: No  Sickle cell disease or trait ()?: No  Hemophilia or other inherited problems of blood?: No  Muscular dystrophy?: No  Cystic fibrosis?: No  [place]'s chorea?: No  Mental retardation/autism?: No  If yes, was the person tested for fragile X?: No  Any other inherited genetic or chromosomal disorder?: No  Maternal metabolic disorder (e.g Insulin-dependent diabetes, PKU)?: No  A child with birth defects not listed above?: No  Recurrent pregnancy loss or stillbirth?: No   Has the patient had any medications/street drugs/alcohol since her last menstrual period?: No  Does the patient or baby's father have any other genetic risks?: No    Infection History   Do you object to being tested for Hepatitis B?: No  Do you object to being tested for HIV?: No   Do you feel that you are at high risk for coming in contact with the AIDS virus?: No  Have you ever been treated for tuberculosis?: No  Have you ever had a positive skin test for tuberculosis?: No  Do you live with someone who has tuberculosis?: No  Have you ever been exposed to tuberculosis?: No  Do you have genital  herpes?: No  Does your partner have genital herpes?: No  Have you had a viral illness since your last period?: No  Have you ever had gonorrhea, chlamydia, syphilis, venereal warts, trichomoniasis, pelvic inflammatory disease or any other sexually transmitted disease?: No  Do you know if you are a genital group B streptococcus carrier?: Yes  Have you had chicken pox/varicella?: (!) Yes   Have you been vaccinated against chicken Pox?: No  Have you had any other infectious diseases?: No      Allergies as of 8/15/2018:    Allergies as of 08/15/2018 - Erick as Reviewed 08/15/2018   Allergen Reaction Noted     Penicillins Hives 01/04/2008       Current medications are:  Current Outpatient Prescriptions   Medication Sig Dispense Refill     albuterol (PROAIR HFA, PROVENTIL HFA, VENTOLIN HFA) 108 (90 BASE) MCG/ACT inhaler Inhale 2 puffs into the lungs every 6 hours as needed for shortness of breath / dyspnea or wheezing 1 Inhaler 0     doxylamine (UNISOM) 25 MG TABS tablet Take 1 tablet (25 mg) by mouth every 8 hours as needed For nausea 30 each 0     Doxylamine-Pyridoxine 10-10 MG TBEC Take 10 mg by mouth At Bedtime 30 tablet 0     Doxylamine-Pyridoxine 10-10 MG TBEC Take 10 mg by mouth At Bedtime 30 tablet 0     Doxylamine-Pyridoxine 10-10 MG TBEC Take 10 mg by mouth At Bedtime 30 tablet 0     Doxylamine-Pyridoxine 10-10 MG TBEC Take 10 mg by mouth At Bedtime 30 tablet 0     metroNIDAZOLE (FLAGYL) 500 MG tablet Take 1 tablet (500 mg) by mouth 2 times daily for 7 days 14 tablet 0     ondansetron (ZOFRAN ODT) 4 MG ODT tab Take 1 tablet (4 mg) by mouth every 6 hours as needed for nausea 10 tablet 0     Prenatal Vit-Fe Fumarate-FA (PRENATAL MULTIVITAMIN  WITH IRON) 28-0.8 MG TABS Take 1 tablet by mouth daily 30 each 0     pyridOXINE (VITAMIN B-6) 25 MG tablet Take 1 tablet (25 mg) by mouth every 8 hours as needed For nausea 30 tablet 0         Early ultrasound screening tool:    Does patient have irregular periods?  No  Did  patient use hormonal birth control in the three months prior to positive urine pregnancy test? No  Is the patient breastfeeding?  No  Is the patient 10 weeks or greater at time of education visit?  Yes

## 2018-08-15 NOTE — MR AVS SNAPSHOT
"              After Visit Summary   8/15/2018    Akua Gr    MRN: [id number]           Patient Information     Date Of Birth          1985        Visit Information        Provider Department      8/15/2018 10:00 AM RD OB [place]URSE [place] [facility]        Today's Diagnoses     High-risk pregnancy    -  1    Need for Tdap vaccination        Abnormal Pap smear of cervix        H/O sickle cell trait           Follow-ups after your visit        Your next 10 appointments already scheduled     Aug 16, 2018 10:30 AM CDT   New Prenatal with CHICHI Briscoe CNM   [facility] [place]    [address] [place] [id number] [phone]              Who to contact     If you have questions or need follow up information about today's clinic visit or your schedule please contact [facility] [place] at [phone].  Normal or non-critical lab and imaging results will be communicated to you by MyChart, letter or phone within 4 business days after the clinic has received the results. If you do not hear from us within 7 days, please contact the clinic through MyChart or phone. If you have a critical or abnormal lab result, we will notify you by phone as soon as possible.  Submit refill requests through [employer] or call your pharmacy and they will forward the refill request to us. Please allow 3 business days for your refill to be completed.          Additional Information About Your Visit        MyChart Information     [employer] lets you send messages to your doctor, view your test results, renew your prescriptions, schedule appointments and more. To sign up, go to [email or web address] . Click on \"Log in\" on the left side of the screen, which will take you to the Welcome page. Then click on \"Sign up Now\" on the right side of the page.     You will be asked to enter the access code listed below, as well as some personal " "information. Please follow the directions to create your username and password.     Your access code is: [email or web address]  Expires: 2018 10:09 AM     Your access code will  in 90 days. If you need help or a new code, please call your [facility] clinic or [phone].        Care EveryWhere ID     This is your Care EveryWhere ID. This could be used by other organizations to access your [facility] medical records  [id number]        Your Vitals Were     Pulse Temperature Height Last Period BMI (Body Mass Index)       98 98  F (36.7  C) 5' 2\" (1.575 m) 2018 25.84 kg/m2        Blood Pressure from Last 3 Encounters:   08/15/18 104/54   18 93/52   18 96/65    Weight from Last 3 Encounters:   08/15/18 141 lb 4.8 oz (64.1 kg)   18 143 lb (64.9 kg)   18 146 lb (66.2 kg)              We Performed the Following     ABO/Rh type and screen     CBC with platelets     Hepatitis B surface antigen     HGB Eval Reflex to ELP or RBC Solubility     HIV Antigen Antibody Combo     Maternal Quad Marker 2nd Trimester     Rubella Antibody IgG Quantitative     Treponema Abs w Reflex to RPR and Titer     UA without Microscopic     Urine Culture Aerobic Bacterial        Primary Care Provider Office Phone # Fax #    [facility] [phone]       [address] [address]        Equal Access to Services     CASSANDRA MCNEAL AH: Hadii aad ku hadasho Soomaali, waaxda luqadaha, qaybta kaalmada adeegyada, waxay hilary hayrosan celso colindres la'kelin . So [facility] [phone].    ATENCIÓN: Si habla español, tiene a lopez disposición servicios gratuitos de asistencia lingüística. Llame al [phone].    We comply with applicable federal civil rights laws and [place] laws. We do not discriminate on the basis of race, color, national origin, age, disability, sex, sexual orientation, or gender identity.            Thank you!     Thank you for choosing [facility]  " for your care. Our goal is always to provide you with excellent care. Hearing back from our patients is one way we can continue to improve our services. Please take a few minutes to complete the written survey that you may receive in the mail after your visit with us. Thank you!             Your Updated Medication List - Protect others around you: Learn how to safely use, store and throw away your medicines at www.disposemymeds.org.          This list is accurate as of 8/15/18 11:07 AM.  Always use your most recent med list.                   Brand Name Dispense Instructions for use Diagnosis    albuterol 108 (90 Base) MCG/ACT inhaler    PROAIR HFA/PROVENTIL HFA/VENTOLIN HFA    1 Inhaler    Inhale 2 puffs into the lungs every 6 hours as needed for shortness of breath / dyspnea or wheezing        doxylamine 25 MG Tabs tablet    UNISOM    30 each    Take 1 tablet (25 mg) by mouth every 8 hours as needed For nausea        * Doxylamine-Pyridoxine 10-10 MG Tbec     30 tablet    Take 10 mg by mouth At Bedtime        * Doxylamine-Pyridoxine 10-10 MG Tbec     30 tablet    Take 10 mg by mouth At Bedtime        * Doxylamine-Pyridoxine 10-10 MG Tbec     30 tablet    Take 10 mg by mouth At Bedtime        * Doxylamine-Pyridoxine 10-10 MG Tbec     30 tablet    Take 10 mg by mouth At Bedtime        metroNIDAZOLE 500 MG tablet    FLAGYL    14 tablet    Take 1 tablet (500 mg) by mouth 2 times daily for 7 days        ondansetron 4 MG ODT tab    ZOFRAN ODT    10 tablet    Take 1 tablet (4 mg) by mouth every 6 hours as needed for nausea        prenatal multivitamin  with iron 28-0.8 MG Tabs     30 each    Take 1 tablet by mouth daily        pyridOXINE 25 MG tablet    vitamin B-6    30 tablet    Take 1 tablet (25 mg) by mouth every 8 hours as needed For nausea        * Notice:  This list has 4 medication(s) that are the same as other medications prescribed for you. Read the directions carefully, and ask your doctor or other care  provider to review them with you.

## 2018-08-16 ENCOUNTER — PRENATAL OFFICE VISIT (OUTPATIENT)
Dept: MIDWIFE SERVICES | Facility: CLINIC | Age: 33
End: 2018-08-16
Payer: COMMERCIAL

## 2018-08-16 VITALS
BODY MASS INDEX: 25.61 KG/M2 | DIASTOLIC BLOOD PRESSURE: 63 MMHG | WEIGHT: 140 LBS | OXYGEN SATURATION: 100 % | HEART RATE: 88 BPM | SYSTOLIC BLOOD PRESSURE: 94 MMHG

## 2018-08-16 DIAGNOSIS — O26.892 HEARTBURN IN PREGNANCY IN SECOND TRIMESTER: Primary | ICD-10-CM

## 2018-08-16 DIAGNOSIS — R12 HEARTBURN IN PREGNANCY IN SECOND TRIMESTER: Primary | ICD-10-CM

## 2018-08-16 DIAGNOSIS — O34.219 PREVIOUS CESAREAN DELIVERY, ANTEPARTUM CONDITION OR COMPLICATION: ICD-10-CM

## 2018-08-16 LAB
# FETUSES US: NORMAL
# FETUSES: NORMAL
AFP ADJ MOM AMN: 0.78
AFP SERPL-MCNC: 38 NG/ML
AGE - REPORTED: 34.8 YR
CURRENT SMOKER: YES
CURRENT SMOKER: YES
DIABETES STATUS PATIENT: NO
FAMILY MEMBER DISEASES HX: NO
FAMILY MEMBER DISEASES HX: NO
GA METHOD: NORMAL
GA METHOD: NORMAL
GA: NORMAL WK
HBV SURFACE AG SERPL QL IA: NONREACTIVE
HCG MOM SERPL: 1.74
HCG SERPL-ACNC: NORMAL IU/L
HIV 1+2 AB+HIV1 P24 AG SERPL QL IA: NONREACTIVE
HX OF HEREDITARY DISORDERS: YES
IDDM PATIENT QL: NO
INHIBIN A MOM SERPL: 1.78
INHIBIN A SERPL-MCNC: 368 PG/ML
INTEGRATED SCN PATIENT-IMP: NORMAL
IVF PREGNANCY: NO
LMP START DATE: NORMAL
MONOCHORIONIC TWINS: NO
PATHOLOGY STUDY: NORMAL
PREV FETUS DEFECT: YES
RUBV IGG SERPL IA-ACNC: 36 IU/ML
SERVICE CMNT-IMP: NO
SPECIMEN DRAWN SERPL: NORMAL
T PALLIDUM AB SER QL: NONREACTIVE
U ESTRIOL MOM SERPL: 0.95
U ESTRIOL SERPL-MCNC: 1.27 NG/ML
VALPROIC/CARBAMAZEPINE STATUS: NO
WEIGHT UNITS: NORMAL

## 2018-08-16 PROCEDURE — 99207 ZZC FIRST OB VISIT: CPT | Performed by: ADVANCED PRACTICE MIDWIFE

## 2018-08-16 RX ORDER — FAMOTIDINE 20 MG/1
20 TABLET, FILM COATED ORAL 2 TIMES DAILY
Qty: 60 TABLET | Refills: 1 | Status: ON HOLD | OUTPATIENT
Start: 2018-08-16 | End: 2019-01-16

## 2018-08-16 NOTE — PROGRESS NOTES
17w6d  NOB here with Dereck.  Their 1st child together. Gr 9 Para 4135  Pt. Last pregnancy had Twins pregnancy with C/S at 26 weeks, 1 twin a boy was diagnosed post delivery with down syndrome and a heart defect.  He  at 5 months of age during heart surgery.  Pt had 4  before C/S delivery.  Will send to [facility] for fetal survey due to previous down syndrome.   Will have MD consult for TOLAC.  In CareEverywhere noted on surgery 13 notes low segment transverse C/S.  Pt having issues with ptylism which causes more nausea and heartburn. Has been seen multiple times in ER for IV hydration.  Discussed at length morning sickness remedies.  Will give prescription for heartburn to help with ptylism.  Will send to [facility] for fetal survey in next 2 weeks and rtc in 4 weeks mrb  Please ask at next visit about smoking.    Akua Gr is a  co-habitating   9 para 4 1 3 5  with a LMP of of 18 giving an EDC by Nakia's rule of 19 who presents for a new OB Visit.  The first positive pregnancy test was obtained on unknown.  Conception date is unknown.  She has not had bleeding since her LMP.  She has had lots of nausea, excessive spit. Weigh loss   has occurred, for a total of 1 pounds.. She denies fever, chills and viral infections since her last LMP.  She stopped taking  (medications) when she suspected pregnancy.  There is mildfatigue.  This was a planned pregnancy.  She is a previous CNM patient.  FOB is Dereck, it is their first child together. .  =========================================    INFECTION HISTORY  HIV: no  Hepatitis B: no  Hepatitis C: no  Tuberculosis: no  Last PPD   Herpes self: no  Herpes partner:  no  Chlamydia:  no  Gonorrhea:  no  HPV: no  BV:  no  Trichomonis:  no  Syphilis:  no  Chicken Pox:  no  ============================================    PERSONAL/SOCIAL HISTORY  Lives lives with their family.  Employment: Part time.  Her job involves moderate activity and long  periods of standing with little potential for toxic exposure.  Additional items: None  =============================================    REVIEW OF SYSTEMS  CONSTITUTIONAL: Denies fever, chills and night sweats  DIET: Appetite is decrease.  Eats a regular.  Caffeine intake daily is 1-2.    Plans to gain 25-35 pounds with her pregnancy.  SKIN: Denies changes and suspicious moles or lesions.  ENT: Denies blurred vision, hearing loss, tinnitus, frequent colds, epistaxis, hoarseness and tooth pain.    ENDOCRINE: Denies heat and cold intolerance, and polydypsia.    BREASTS:  Intermittent tenderness since LMP.  Denies discharge and lumps.   HEART/LUNGS: Denies dyspnea, wheezing, coughing and chest pain.  HEMATOLOGIC: Denies tendency to bruise and history of blood clots.  GASTROINTESTINAL: Denies heartburn, indigestion and change of color in stools.    GENITOURINARY:  Denies urgency, dysuria and hematuria.  Has frequency of urination since LMP.   NEUROLOGIC:  Denies seizures, weakness and fainting.  PSYCHIATRIC:  Denies depression or anxiety  ===========================================    PHYSICAL EXAM  GENERAL:   pleasant pregnant female, alert, cooperative  and well groomed.  SKIN:  Warm and dry, without lesions or tenderness.    HEAD: Symmetrical features.  EYES:  PERRLA, wears no corective lenses.  EARS: Tympanic membranes gray, translucent and intact.  NOSE: No flaring, patent  MOUTH:  Buccal mucosa pink, moist without lesions.  Teeth in good repair.    NECK:  Thyroid without enlargement and nodules.  Lymph nodes not palpable.   LUNGS:  Clear to auscultation.  BREAST:  Symmetrical without lesions or nodes.  Nipples everted.  Areolas symmetric.  No palpable axillary nodes.  HEART:  RRR without murmur.  ABDOMEN: Soft without masses or tenderness.  No CVA tenderness.  Uterus palpable at size equal to dates.    .Well healed scar from  section.  MUSCULOSKELETAL:  Full range of motion  GENITALIA: Secondary genital  hair growth pattern is in a normal female distribution.  [place] and [place] glands without tenderness and inflammation.  Perineum without lesions.  VAGINA:  Pink, normal ruga and discharge.  PELVIC deferred  PELVIS:   Pelvis proven to 6 pounds 13 ounces.  EXTREMITIES:  3+/3+ DTR, No edema. No significant varicosities.  ===================================================    PLAN:  Instructed on use of triage nurse line and contacting the on call CNM after hours in an emergency.    Discussed the indications, uses for and false positives for quad screen and fetal survey ultrasounds at 18-20 weeks gestation.  Will inform us at the next visit if she wished to avail herself of these screens.  Will return to the clinic in 4 weeks for her next routine prenatal check.  Will call to be seen sooner if problem arise.  Discussed the risks, benefits, side effects and alternative therapies for current prescribed and OTC medications.

## 2018-08-16 NOTE — NURSING NOTE
"Chief Complaint   Patient presents with     Prenatal Care       Initial LMP 2018 Estimated body mass index is 25.84 kg/(m^2) as calculated from the following:    Height as of 8/15/18: 5' 2\" (1.575 m).    Weight as of 8/15/18: 141 lb 4.8 oz (64.1 kg).  BP completed using cuff size: regular        The following HM Due: pap smear      The following patient reported/Care Every where data was sent to:  P ABSTRACT QUALITY INITIATIVES [24313]  none      n/a              "

## 2018-08-16 NOTE — MR AVS SNAPSHOT
After Visit Summary   2018    Akua Gr    MRN: [id number]           Patient Information     Date Of Birth          1985        Visit Information        Provider Department      2018 10:30 AM Sarah Alves APRN CNM [facility]        Today's Diagnoses     Heartburn in pregnancy in second trimester    -  1    Previous  delivery, antepartum condition or complication           Follow-ups after your visit        Additional Services     MAT FETAL MED CTR REFERRAL-PREGNANCY       Body mass index is 25.61 kg/(m^2).    >> Patient may proceed with recommendations for further testing as directed by the Maternal Fetal Medicine Specialist >>    >> If requesting Fetal Echo: MFM will determine appropriate location for exam due to indication.    >> If requesting Lung Maturity Amnio:  If results indicate fetal lung maturity, induction or C/S is recommended within 36 hours.  Please schedule accordingly.     Dear Patient:   Please be aware that coverage of these services is subject to the terms and limitations of your health insurance plan.  Call member services at your health plan with any benefit or coverage questions.      Please bring the following to your appointment:    >>  Any x-rays, CTs or MRIs which have been performed.  Contact the facility where they were done to arrange for  prior to your scheduled appointment.  Any new CT, MRI or other procedures ordered by your specialist must be performed at a [facility] facility or coordinated by your clinic's referral office.  >>  List of current medications   >>  This referral request   >>  Any documents/labs given to you for this referral                  Your next 10 appointments already scheduled     Sep 11, 2018 10:15 AM CDT   ESTABLISHED PRENATAL with CHICHI Briscoe CNM   [facility] [place]    [address] [place] [address]  "  [phone]              Who to contact     If you have questions or need follow up information about today's clinic visit or your schedule please contact [facility] directly at [phone].  Normal or non-critical lab and imaging results will be communicated to you by MyChart, letter or phone within 4 business days after the clinic has received the results. If you do not hear from us within 7 days, please contact the clinic through MyChart or phone. If you have a critical or abnormal lab result, we will notify you by phone as soon as possible.  Submit refill requests through [employer] or call your pharmacy and they will forward the refill request to us. Please allow 3 business days for your refill to be completed.          Additional Information About Your Visit        [email or web address]     [employer] lets you send messages to your doctor, view your test results, renew your prescriptions, schedule appointments and more. To sign up, go to [email or web address] . Click on \"Log in\" on the left side of the screen, which will take you to the Welcome page. Then click on \"Sign up Now\" on the right side of the page.     You will be asked to enter the access code listed below, as well as some personal information. Please follow the directions to create your username and password.     Your access code is: [email or web address]  Expires: 2018 10:09 AM     Your access code will  in 90 days. If you need help or a new code, please call your [place] clinic or [phone].        Care EveryWhere ID     This is your Care EveryWhere ID. This could be used by other organizations to access your [place] medical records  [id number]        Your Vitals Were     Pulse Last Period Pulse Oximetry Breastfeeding? BMI (Body Mass Index)       88 2018 (Exact Date) 100% No 25.61 kg/m2        Blood Pressure from Last 3 Encounters:   18 94/63   08/15/18 104/54   18 93/52    Weight from Last 3 Encounters: "   08/16/18 140 lb (63.5 kg)   08/15/18 141 lb 4.8 oz (64.1 kg)   08/12/18 143 lb (64.9 kg)              We Performed the Following     MAT FETAL MED CTR REFERRAL-PREGNANCY          Today's Medication Changes          These changes are accurate as of 8/16/18  1:11 PM.  If you have any questions, ask your nurse or doctor.               Start taking these medicines.        Dose/Directions    famotidine 20 MG tablet   Commonly known as:  PEPCID   Used for:  Heartburn in pregnancy in second trimester   Started by:  Sarah Alves APRN CNSANDRA        Dose:  20 mg   Take 1 tablet (20 mg) by mouth 2 times daily   Quantity:  60 tablet   Refills:  1            Where to get your medicines      These medications were sent to [facility] [address] [place] [address] [address] AT [address] [place] [address] & [facility]  [address] [place] [address]    Hours:  24-hours Phone:  [phone]     famotidine 20 MG tablet                Primary Care Provider Office Phone # Fax #    [facility] [phone]       [address] [facility] [address]  [facility] [address]        Equal Access to Services     CASSANDRA MCNEAL AH: Hadii dana ku hadasho Soomaali, waaxda luqadaha, qaybta kaalmada adeegyada, waxay delmyin haykelin riggins. So [facility] [phone].    ATENCIÓN: Si habla español, tiene a lopez disposición servicios gratuitos de asistencia lingüística. Brianda al [phone].    We comply with applicable federal civil rights laws and [place] laws. We do not discriminate on the basis of race, color, national origin, age, disability, sex, sexual orientation, or gender identity.            Thank you!     Thank you for choosing [facility]  for your [place]. Our goal is always to provide you with excellent care. Hearing back from our patients is one way we can continue to improve our services. Please take a few minutes to complete the written survey that you may receive in the mail after your visit with  us. Thank you!             Your Updated Medication List - Protect others around you: Learn how to safely use, store and throw away your medicines at www.disposemymeds.org.          This list is accurate as of 8/16/18  1:11 PM.  Always use your most recent med list.                   Brand Name Dispense Instructions for use Diagnosis    albuterol 108 (90 Base) MCG/ACT inhaler    PROAIR HFA/PROVENTIL HFA/VENTOLIN HFA    1 Inhaler    Inhale 2 puffs into the lungs every 6 hours as needed for shortness of breath / dyspnea or wheezing        doxylamine 25 MG Tabs tablet    UNISOM    30 each    Take 1 tablet (25 mg) by mouth every 8 hours as needed For nausea        * Doxylamine-Pyridoxine 10-10 MG Tbec     30 tablet    Take 10 mg by mouth At Bedtime        * Doxylamine-Pyridoxine 10-10 MG Tbec     30 tablet    Take 10 mg by mouth At Bedtime        * Doxylamine-Pyridoxine 10-10 MG Tbec     30 tablet    Take 10 mg by mouth At Bedtime        * Doxylamine-Pyridoxine 10-10 MG Tbec     30 tablet    Take 10 mg by mouth At Bedtime        famotidine 20 MG tablet    PEPCID    60 tablet    Take 1 tablet (20 mg) by mouth 2 times daily    Heartburn in pregnancy in second trimester       metroNIDAZOLE 500 MG tablet    FLAGYL    14 tablet    Take 1 tablet (500 mg) by mouth 2 times daily for 7 days        prenatal multivitamin  with iron 28-0.8 MG Tabs     30 each    Take 1 tablet by mouth daily        pyridOXINE 25 MG tablet    vitamin B-6    30 tablet    Take 1 tablet (25 mg) by mouth every 8 hours as needed For nausea        * Notice:  This list has 4 medication(s) that are the same as other medications prescribed for you. Read the directions carefully, and ask your doctor or other care provider to review them with you.

## 2018-08-17 LAB
HGB A1 MFR BLD: 97 % (ref 95–97.9)
HGB A2 MFR BLD: 2.6 % (ref 2–3.5)
HGB C MFR BLD: 0 % (ref 0–0)
HGB E MFR BLD: 0 % (ref 0–0)
HGB F MFR BLD: 0.4 % (ref 0–2.1)
HGB FRACT BLD ELPH-IMP: NORMAL
HGB OTHER MFR BLD: 0 % (ref 0–0)
HGB S BLD QL SOLY: NORMAL
HGB S MFR BLD: 0 % (ref 0–0)
PATH INTERP BLD-IMP: NORMAL

## 2018-08-18 LAB
BACTERIA SPEC CULT: ABNORMAL
SPECIMEN SOURCE: ABNORMAL

## 2018-08-18 RX ORDER — NITROFURANTOIN 25; 75 MG/1; MG/1
100 CAPSULE ORAL 2 TIMES DAILY
Qty: 20 CAPSULE | Refills: 0 | Status: ON HOLD | OUTPATIENT
Start: 2018-08-18 | End: 2019-01-16

## 2018-08-23 ENCOUNTER — TELEPHONE (OUTPATIENT)
Dept: MIDWIFE SERVICES | Facility: CLINIC | Age: 33
End: 2018-08-23

## 2018-08-27 ENCOUNTER — PRE VISIT (OUTPATIENT)
Dept: MATERNAL FETAL MEDICINE | Facility: CLINIC | Age: 33
End: 2018-08-27

## 2018-08-28 ENCOUNTER — OFFICE VISIT (OUTPATIENT)
Dept: MATERNAL FETAL MEDICINE | Facility: CLINIC | Age: 33
End: 2018-08-28
Attending: ADVANCED PRACTICE MIDWIFE
Payer: COMMERCIAL

## 2018-08-28 ENCOUNTER — HOSPITAL ENCOUNTER (OUTPATIENT)
Dept: ULTRASOUND IMAGING | Facility: CLINIC | Age: 33
Discharge: HOME | End: 2018-08-28
Attending: ADVANCED PRACTICE MIDWIFE | Admitting: ADVANCED PRACTICE MIDWIFE
Payer: COMMERCIAL

## 2018-08-28 DIAGNOSIS — O28.3 ABNORMAL ANTENATAL ULTRASOUND: ICD-10-CM

## 2018-08-28 DIAGNOSIS — O26.90 PREGNANCY RELATED CONDITION, ANTEPARTUM: ICD-10-CM

## 2018-08-28 DIAGNOSIS — O44.42 LOW-LYING PLACENTA IN SECOND TRIMESTER: ICD-10-CM

## 2018-08-28 DIAGNOSIS — Z87.59 HISTORY OF PRIOR PREGNANCY WITH SGA NEWBORN: ICD-10-CM

## 2018-08-28 DIAGNOSIS — O28.3 ABNORMAL ANTENATAL ULTRASOUND: Primary | ICD-10-CM

## 2018-08-28 DIAGNOSIS — O28.0 ABNORMAL MATERNAL SERUM SCREENING TEST: Primary | ICD-10-CM

## 2018-08-28 PROCEDURE — 40000791 ZZHCL STATISTIC VERIFI PRENATAL TRISOMY 21,18,13: Performed by: OBSTETRICS & GYNECOLOGY

## 2018-08-28 PROCEDURE — 96040 ZZH GENETIC COUNSELING, EACH 30 MINUTES: CPT | Mod: ZF | Performed by: GENETIC COUNSELOR, MS

## 2018-08-28 PROCEDURE — 76811 OB US DETAILED SNGL FETUS: CPT

## 2018-08-28 PROCEDURE — 36415 COLL VENOUS BLD VENIPUNCTURE: CPT | Performed by: OBSTETRICS & GYNECOLOGY

## 2018-08-28 NOTE — MR AVS SNAPSHOT
After Visit Summary   2018    Akua Gr    MRN: [id number]           Patient Information     Date Of Birth          1985        Visit Information        Provider Department      2018 2:00 PM Lolis Muhammad MD [facility] Maternal Fetal Medicine [facility]        Today's Diagnoses     Abnormal maternal serum screening test    -  1    History of prior pregnancy with SGA         Low-lying placenta in second trimester           Follow-ups after your visit        Your next 10 appointments already scheduled     Sep 11, 2018 10:15 AM CDT   ESTABLISHED PRENATAL with CHICHI Briscoe CNM   [facility] ([facility])    [address] [facility] [address]  [facility] [address]   [phone]            Sep 19, 2018 10:15 AM CDT   MFM US COMPRE SINGLE F/U with [id number]   [facility] Maternal Fetal Medicine Ultrasound [facility] ([facility])    [address]  [facility] [id number] [phone]           Wear comfortable clothes and leave your valuables at home.            Sep 19, 2018 10:45 AM CDT   Radiology MD with UR JERSON LINTON   [facility] Maternal Fetal Medicine [facility])    [address]  [facility] [address]   [phone]           Please arrive at the time given for your first appointment. This visit is used internally to schedule the physician's time during your ultrasound.              Future tests that were ordered for you today     Open Future Orders        Priority Expected Expires Ordered    [facility] US Comprehensive Single F/U Routine  2019    MF US Comprehensive Single F/U Routine 10/23/2018 2019 2018            Who to contact     If you have questions or need follow up information about today's clinic visit or your schedule please contact [facility] MATERNAL FETAL MEDICINE [facility] directly at  "[phone].  Normal or non-critical lab and imaging results will be communicated to you by MyChart, letter or phone within 4 business days after the clinic has received the results. If you do not hear from us within 7 days, please contact the clinic through [employer] or phone. If you have a critical or abnormal lab result, we will notify you by phone as soon as possible.  Submit refill requests through [employer] or call your pharmacy and they will forward the refill request to us. Please allow 3 business days for your refill to be completed.          Additional Information About Your Visit        [employer] Information     [employer] lets you send messages to your doctor, view your test results, renew your prescriptions, schedule appointments and more. To sign up, go to [email or web address] . Click on \"Log in\" on the left side of the screen, which will take you to the Welcome page. Then click on \"Sign up Now\" on the right side of the page.     You will be asked to enter the access code listed below, as well as some personal information. Please follow the directions to create your username and password.     Your access code is: [email or web address]  Expires: 2018 10:09 AM     Your access code will  in 90 days. If you need help or a new code, please call your [place] clinic or [phone].        Care EveryWhere ID     This is your Care EveryWhere ID. This could be used by other organizations to access your [place] medical records  [id number]        Your Vitals Were     Last Period                   2018 (Exact Date)            Blood Pressure from Last 3 Encounters:   18 94/63   08/15/18 104/54   18 93/52    Weight from Last 3 Encounters:   18 63.5 kg (140 lb)   08/15/18 64.1 kg (141 lb 4.8 oz)   18 64.9 kg (143 lb)               Primary Care Provider Office Phone # Fax #    [facility] [phone]       [address] [place] [address]      "   Equal Access to Services     [facility] : Hadii dana hester bernadettesonja Jeanie, waaxda luqadaha, qaybta kazelalemmelanie gómez, av gonzalesgeovanyriley tony . So [facility] [phone].    ATENCIÓN: Si habla español, tiene a lopez disposición servicios gratuitos de asistencia lingüística. Svetaame al [phone].    We comply with applicable federal civil rights laws and [place] laws. We do not discriminate on the basis of race, color, national origin, age, disability, sex, sexual orientation, or gender identity.            Thank you!     Thank you for choosing [facility]  for your care. Our goal is always to provide you with excellent care. Hearing back from our patients is one way we can continue to improve our services. Please take a few minutes to complete the written survey that you may receive in the mail after your visit with us. Thank you!             Your Updated Medication List - Protect others around you: Learn how to safely use, store and throw away your medicines at www.disposemymeds.org.          This list is accurate as of 8/28/18  8:41 PM.  Always use your most recent med list.                   Brand Name Dispense Instructions for use Diagnosis    albuterol 108 (90 Base) MCG/ACT inhaler    PROAIR HFA/PROVENTIL HFA/VENTOLIN HFA    1 Inhaler    Inhale 2 puffs into the lungs every 6 hours as needed for shortness of breath / dyspnea or wheezing        doxylamine 25 MG Tabs tablet    UNISOM    30 each    Take 1 tablet (25 mg) by mouth every 8 hours as needed For nausea        * Doxylamine-Pyridoxine 10-10 MG Tbec     30 tablet    Take 10 mg by mouth At Bedtime        * Doxylamine-Pyridoxine 10-10 MG Tbec     30 tablet    Take 10 mg by mouth At Bedtime        * Doxylamine-Pyridoxine 10-10 MG Tbec     30 tablet    Take 10 mg by mouth At Bedtime        * Doxylamine-Pyridoxine 10-10 MG Tbec     30 tablet    Take 10 mg by mouth At Bedtime        famotidine 20 MG tablet    PEPCID     60 tablet    Take 1 tablet (20 mg) by mouth 2 times daily    Heartburn in pregnancy in second trimester       nitroFURantoin (macrocrystal-monohydrate) 100 MG capsule    MACROBID    20 capsule    Take 1 capsule (100 mg) by mouth 2 times daily    Dysuria       prenatal multivitamin  with iron 28-0.8 MG Tabs     30 each    Take 1 tablet by mouth daily        pyridOXINE 25 MG tablet    vitamin B-6    30 tablet    Take 1 tablet (25 mg) by mouth every 8 hours as needed For nausea        * Notice:  This list has 4 medication(s) that are the same as other medications prescribed for you. Read the directions carefully, and ask your doctor or other care provider to review them with you.

## 2018-08-28 NOTE — PROGRESS NOTES
[facility]  Genetic Counseling Consult    Patient: Akua Gr YOB: 1985   Date of Service:  18      Akua Gr was seen at the [facility] for genetic consultation given abnormal quad screen results.      Impression/Plan:   Akua had an ultrasound and blood draw for NIPT (Innatal test through [employer]).  Results are expected within 7-10 days, and will be available in [employer]. We will contact her to discuss the results, and a copy will be forwarded to the office of the referring OB provider.      Pregnancy History:   /Parity:                             Age at Delivery: 33 year old  BENNETT: 2019, by Last Menstrual Period            Gestational Age: 19w4d  - No significant complications were reported in the current pregnancy.  - Akua is smoking 4-5 cigarettes a day in pregnancy.  - Pregnancy history (by patient report):    2000: term, , male    2001: term, , female    EAB    2005: term, , female    2012: term, , female    2013: 26 week, , di-di twins, male and female. Male diagnosed postnatally with congenital heart defect and Down syndrome,  at 5 months.    2013: EAB       Family History:   A three-generation pedigree was obtained, and is scanned under the  Media  tab.   The following significant findings were reported by Akua:    As stated above, Akua's son Ezra was diagnosed postnatally with Down syndrome. Akua reports that testing was performed at [facility]. She is uncertain if Ezra had Down syndrome due nondisjunction of chromosome 21 or due to a translocation form. We reviewed that most cases of Down syndrome are caused by nondisjunction of chromosome 21. However, approximately 5% of individuals with Down syndrome can have an unbalanced translocation form of Down syndrome which may carry implications for subsequent. Akua signed a  medical record release so that genetic testing results could be obtained.    Akua reported that her paternal half sister also had twins which may have Down syndrome. She does not have any additional information regarding her paternal half sister's children.    Otherwise, the reported family history is negative for multiple miscarriages, stillbirths, birth defects, mental retardation, known genetic conditions, and consanguinity.       Carrier Screening:   The patient reports that she and the father of the pregnancy have  ancestry:      We reviewed the clinical features, autosomal recessive inheritance, and options for carrier screening and  screening for hemoglobinopathies. The patient had a normal hemoglobin electrophoresis through her primary OB clinic. A normal hemoglobin electrophoresis significantly reduces the chance that she is a carrier for sickle cell disease (has sickle cell trait). Additional screening was not discussed today.    Risk Assessment:   We explained that the risk for fetal chromosome abnormalities increases with maternal age. We discussed specific features of common chromosome abnormalities, including Down syndrome, trisomy 13, trisomy 18, and sex chromosome trisomies.      At age 33 at delivery, the midtrimester risk to have a baby with Down syndrome is 1 in 342.     At age 33 at delivery, the midtrimester risk to have a baby with any chromosome abnormality is 1 in  172.     Women with a previous pregnancy with aneuploidy (due to nondisjunction of chromosome) have a higher than age related risk for aneuploidy in a subsequent pregnancy. More specifically, the recurrence risk is 1%. The recurrence risk could be higher if Down syndrome were caused by a chromosome rearrangement.    The patient had a quad screen earlier in pregnancy.     The results were Screen Positive.     Chemical values were as follows:    AFP 0.78 MoM, hCG 1.74 MoM, estriol 0.95 MoM, and CODEY 1.78  MoM    This screen gave the following risk assessments:  o Down syndrome risk= 1:85  o Trisomy 18 risk= 1:10,800  o Open neural tube defects risk=  < 1:10,000  o We discussed the limitation of quad screen and that an abnormal result does not indicate that the pregnancy is affected.      Testing Options:   We discussed the following options:   Non-invasive Prenatal Testing (NIPT)    Maternal plasma cell-free DNA testing; first trimester ultrasound with nuchal translucency and nasal bone assessment is recommended, when appropriate    Screens for fetal trisomy 21, trisomy 13, trisomy 18, and sex chromosome aneuploidy    Cannot screen for open neural tube defects; maternal serum AFP after 15 weeks is recommended     Genetic Amniocentesis    Invasive procedure typically performed in the second trimester by which amniotic fluid is obtained for the purpose of chromosome analysis and/or other prenatal genetic analysis    Diagnostic results; >99% sensitivity for fetal chromosome abnormalities    AFAFP measurement tests for open neural tube defects     Comprehensive (Level II) ultrasound: Detailed ultrasound performed between 18-22 weeks gestation to screen for major birth defects and markers for aneuploidy.    We reviewed the benefits and limitations of this testing.  Screening tests provide a risk assessment specific to the pregnancy for certain fetal chromosome abnormalities, but cannot definitively diagnose or exclude a fetal chromosome abnormality.  Follow-up genetic counseling and consideration of diagnostic testing is recommended with any abnormal screening result.     Diagnostic tests carry inherent risks- including risk of miscarriage- that require careful consideration.  These tests can detect fetal chromosome abnormalities with greater than 99% certainty.  Results can be compromised by maternal cell contamination or mosaicism, and are limited by the resolution of cytogenetic G-banding technology.  There is no  screening nor diagnostic test that can detect all forms of birth defects or mental disability.    It was a pleasure to be involved with Akua scott care. Face-to-face time of the meeting was 35 minutes.  Kaila Luevano MS, [facility]  Maternal Fetal Medicine  [facility]  Phone:[phone]  Email: [email or web address]

## 2018-08-29 NOTE — PROGRESS NOTES
Please see ultrasound report under imaging tab for details on ultrasound performed today.    Lolis Muhammad MD  , OB/GYN  Maternal-Fetal Medicine  [email or web address]  [phone] (Academic office)  [phone] (Pager)

## 2018-09-04 ENCOUNTER — TELEPHONE (OUTPATIENT)
Dept: MATERNAL FETAL MEDICINE | Facility: CLINIC | Age: 33
End: 2018-09-04

## 2018-09-04 LAB — LAB SCANNED RESULT: NORMAL

## 2018-09-04 NOTE — TELEPHONE ENCOUNTER
Called and discussed normal NIPT results with Akua. Results indicate NO ANEUPLOIDY DETECTED for chromosomes 21, 18, 13, or sex chromosomes (XX). Fetal sex (female) was disclosed. This puts her current pregnancy at low risk for Down syndrome, trisomy 18, trisomy 13 and sex chromosome abnormalities. This test is reported to have the following sensitivities: Down syndrome- 99%, trisomy 18- 98%, and trisomy 13- 98%. Although these results are reassuring, this does not replace a standard chromosome analysis from an amniocentesis.  Her results are available in her Epic chart for her primary OB to review.      Kaila Luevano MS, [facility]  Maternal Fetal Medicine  [phone]

## 2018-09-11 ENCOUNTER — PRENATAL OFFICE VISIT (OUTPATIENT)
Dept: MIDWIFE SERVICES | Facility: CLINIC | Age: 33
End: 2018-09-11
Payer: COMMERCIAL

## 2018-09-11 VITALS
SYSTOLIC BLOOD PRESSURE: 101 MMHG | OXYGEN SATURATION: 95 % | HEIGHT: 62 IN | DIASTOLIC BLOOD PRESSURE: 68 MMHG | BODY MASS INDEX: 26.87 KG/M2 | WEIGHT: 146 LBS | HEART RATE: 77 BPM

## 2018-09-11 DIAGNOSIS — O34.219 PREVIOUS CESAREAN DELIVERY, ANTEPARTUM CONDITION OR COMPLICATION: Primary | ICD-10-CM

## 2018-09-11 PROCEDURE — 99207 ZZC PRENATAL VISIT: CPT | Performed by: ADVANCED PRACTICE MIDWIFE

## 2018-09-11 NOTE — Clinical Note
Hi, I would like patient to come  Next month for education on testing for 1-2 weeks as she will decline her 1 hour GCT. Thank you, Sarah Alves CNM

## 2018-09-11 NOTE — PROGRESS NOTES
Akua Gr  at 21w4d by US reports feeling well overall. Good fetal movement. Denies cramping/ctx, LOF/discharge, bleeding, dysuria, headaches. She reports increase in stress incontinence. She would like to review labs and ultrasound.     Reviewed labs. Ultrasound showed EIVF, otherwise normal anatomy. NIPT showed no increased risk.    RTC 4 weeks with OB for TOLAC consent. GCT at that time.     Amadou Tsai RN, MPH, Student [place]

## 2018-09-11 NOTE — PROGRESS NOTES
21w4d  Patient wants to review U/S and discussed no increased risk of downs syndrome based upon NIPT results.  Pt tends to have 6 lbs babies and all do fine so she is ok with watching fetal growth with U/S but is reassured.   Pt having some stress incontinence this pregnancy, enc. kegals and reviewed foods that are bladder irritants.  At end of visit patient states she has declined 1 hour GCT and states she has always declined, discussed that will send to Diabetic education for 1-2 weeks for monitoring.  Pt questions if she needs it.  rtc in 4 weeks dina

## 2018-09-11 NOTE — MR AVS SNAPSHOT
"              After Visit Summary   2018    Akua Gr    MRN: [id number]           Patient Information     Date Of Birth          1985        Visit Information        Provider Department      2018 10:15 AM [facility]        Today's Diagnoses     Previous  delivery, antepartum condition or complication    -  1       Follow-ups after your visit        Additional Services     DIABETES EDUCATOR REFERRAL       DIABETES SELF MANAGEMENT TRAINING (DSMT)      Patient is declining 1 hour GCT states has always refused and \"babies are fine\"  Would like patient to be set up for 1-2 weeks blood sugar testing instead of 1 hour GCT.      Your provider has referred you to Diabetes Education: FMG: Diabetes Education - All [facility] [phone]   [email or web address]     If an urgent visit is needed or A1C is above 12, Care Team to call the Diabetes  Education Team at [phone] or send an In Basket message to the Diabetes Education Pool (P DIAB ED-PATIENT CARE).    A  will call you to make your appointment. If it has been more than 3 business days since your referral was placed, please call the above phone number to schedule.    Type of training and number of hours:     Diabetes Type: Uncertain   Medicare covers: 10 hours of initial DSMT in 12 month period from the time of first visit, plus 2 hours of follow-up DSMT annually, and additional hours as requested for insulin training.         Diabetes Co-Morbidities: none               A1C Goal:  None        A1C is: No results found for: A1C    MEDICAL NUTRITION THERAPY (MNT) for Diabetes    Medical Nutrition Therapy with a Registered Dietitian can be provided in coordination with Diabetes Self-Management Training to assist in achieving optimal diabetes management.    MNT Type and Hours: Do not initiate MNT at this time.                     "   Medicare will cover: 3 hours initial MNT in 12 month period after first visit, plus 2 hours of follow-up MNT annually        Diabetes Education Topics: Knowledge: Monitoring Blood Sugar    Special Educational Needs Requiring Individual DSMT: None      Please be aware that coverage of these services is subject to the terms and limitations of your health insurance plan.  Call member services at your health plan to determine Diabetes Self-Management Training (Codes  and ) and Medical Nutrition Therapy (Codes 28654 and 71557) benefits and ask which blood glucose monitor brands are covered by your plan.  Please bring the following with you to your appointment:    (1)  List of current medications   (2)  List of Blood Glucose Monitor brands that are covered by your insurance plan  (3)  Blood Glucose Monitor and log book  (4)   Food records for the 3 days prior to your visit    The Certified Diabetes Educator may make diabetes medication adjustments per the CDE Protocol and Collaborative Practice Agreement.                  Your next 10 appointments already scheduled     Sep 19, 2018 10:15 AM CDT   MFM US COMPRE SINGLE F/U with [id number]   [facility] Ultrasound [facility])    [address]  [facility] [id number] [phone]           Wear comfortable clothes and leave your valuables at home.            Sep 19, 2018 10:45 AM CDT   Radiology MD with UR JERSON LINTON   [facility])    [address]  [facility] [id number] [phone]           Please arrive at the time given for your first appointment. This visit is used internally to schedule the physician's time during your ultrasound.              Who to contact     If you have questions or need follow up information about today's clinic visit or your schedule please contact [facility]  "[place] directly at [phone].  Normal or non-critical lab and imaging results will be communicated to you by MyChart, letter or phone within 4 business days after the clinic has received the results. If you do not hear from us within 7 days, please contact the clinic through [employer] or phone. If you have a critical or abnormal lab result, we will notify you by phone as soon as possible.  Submit refill requests through [employer] or call your pharmacy and they will forward the refill request to us. Please allow 3 business days for your refill to be completed.          Additional Information About Your Visit        [employer] Information     [employer] lets you send messages to your doctor, view your test results, renew your prescriptions, schedule appointments and more. To sign up, go to [email or web address] . Click on \"Log in\" on the left side of the screen, which will take you to the Welcome page. Then click on \"Sign up Now\" on the right side of the page.     You will be asked to enter the access code listed below, as well as some personal information. Please follow the directions to create your username and password.     Your access code is: [email or web address]  Expires: 2018 10:09 AM     Your access code will  in 90 days. If you need help or a new code, please call your [place] clinic or [phone].        Care EveryWhere ID     This is your Care EveryWhere ID. This could be used by other organizations to access your [place] medical records  [id number]        Your Vitals Were     Pulse Height Last Period Pulse Oximetry Breastfeeding? BMI (Body Mass Index)    77 5' 2\" (1.575 m) 2018 (Exact Date) 95% No 26.7 kg/m2       Blood Pressure from Last 3 Encounters:   18 101/68   18 94/63   08/15/18 104/54    Weight from Last 3 Encounters:   18 146 lb (66.2 kg)   18 140 lb (63.5 kg)   08/15/18 141 lb 4.8 oz (64.1 kg)              We Performed the Following     DIABETES EDUCATOR " REFERRAL        Primary Care Provider Office Phone # Fax #    [facility] [phone]       [address] [address]        Equal Access to Services     CASSANDRA MCNEAL : Hadii aad ku hadalexsandero Soomaali, waaxda luqadaha, qaybta kaalmada adeegyada, waxchante banuelosn celso colindres laDarrenkelin riggins. So [facility] [phone].    ATENCIÓN: Si habla español, tiene a lopez disposición servicios gratuitos de asistencia lingüística. Brianda al [phone].    We comply with applicable federal civil rights laws and [place] laws. We do not discriminate on the basis of race, color, national origin, age, disability, sex, sexual orientation, or gender identity.            Thank you!     Thank you for choosing [facility]  for your care. Our goal is always to provide you with excellent care. Hearing back from our patients is one way we can continue to improve our services. Please take a few minutes to complete the written survey that you may receive in the mail after your visit with us. Thank you!             Your Updated Medication List - Protect others around you: Learn how to safely use, store and throw away your medicines at www.disposemymeds.org.          This list is accurate as of 9/11/18  1:05 PM.  Always use your most recent med list.                   Brand Name Dispense Instructions for use Diagnosis    albuterol 108 (90 Base) MCG/ACT inhaler    PROAIR HFA/PROVENTIL HFA/VENTOLIN HFA    1 Inhaler    Inhale 2 puffs into the lungs every 6 hours as needed for shortness of breath / dyspnea or wheezing        doxylamine 25 MG Tabs tablet    UNISOM    30 each    Take 1 tablet (25 mg) by mouth every 8 hours as needed For nausea        * Doxylamine-Pyridoxine 10-10 MG Tbec     30 tablet    Take 10 mg by mouth At Bedtime        * Doxylamine-Pyridoxine 10-10 MG Tbec     30 tablet    Take 10 mg by mouth At Bedtime        * Doxylamine-Pyridoxine 10-10 MG Tbec     30 tablet    Take 10  mg by mouth At Bedtime        * Doxylamine-Pyridoxine 10-10 MG Tbec     30 tablet    Take 10 mg by mouth At Bedtime        famotidine 20 MG tablet    PEPCID    60 tablet    Take 1 tablet (20 mg) by mouth 2 times daily    Heartburn in pregnancy in second trimester       nitroFURantoin (macrocrystal-monohydrate) 100 MG capsule    MACROBID    20 capsule    Take 1 capsule (100 mg) by mouth 2 times daily    Dysuria       prenatal multivitamin  with iron 28-0.8 MG Tabs     30 each    Take 1 tablet by mouth daily        pyridOXINE 25 MG tablet    vitamin B-6    30 tablet    Take 1 tablet (25 mg) by mouth every 8 hours as needed For nausea        * Notice:  This list has 4 medication(s) that are the same as other medications prescribed for you. Read the directions carefully, and ask your doctor or other care provider to review them with you.

## 2018-09-19 ENCOUNTER — HOSPITAL ENCOUNTER (OUTPATIENT)
Dept: ULTRASOUND IMAGING | Facility: CLINIC | Age: 33
Discharge: HOME | End: 2018-09-19
Attending: OBSTETRICS & GYNECOLOGY | Admitting: OBSTETRICS & GYNECOLOGY
Payer: COMMERCIAL

## 2018-09-19 ENCOUNTER — OFFICE VISIT (OUTPATIENT)
Dept: MATERNAL FETAL MEDICINE | Facility: CLINIC | Age: 33
End: 2018-09-19
Attending: OBSTETRICS & GYNECOLOGY
Payer: COMMERCIAL

## 2018-09-19 DIAGNOSIS — O44.42 LOW-LYING PLACENTA IN SECOND TRIMESTER: ICD-10-CM

## 2018-09-19 DIAGNOSIS — Z87.59 HISTORY OF PRIOR PREGNANCY WITH SGA NEWBORN: Primary | ICD-10-CM

## 2018-09-19 DIAGNOSIS — O36.5920 POOR FETAL GROWTH AFFECTING MANAGEMENT OF MOTHER IN SECOND TRIMESTER, SINGLE OR UNSPECIFIED FETUS: ICD-10-CM

## 2018-09-19 PROCEDURE — 76816 OB US FOLLOW-UP PER FETUS: CPT

## 2018-09-19 NOTE — MR AVS SNAPSHOT
After Visit Summary   2018    Akua Gr    MRN: [id number]           Patient Information     Date Of Birth          1985        Visit Information        Provider Department      2018 3:30 PM Mel Newton MD [facility] - [facility]        Today's Diagnoses     History of prior pregnancy with SGA     -  1    Low-lying placenta in second trimester        Poor fetal growth affecting management of mother in second trimester, single or unspecified fetus           Follow-ups after your visit        Your next 10 appointments already scheduled     Oct 10, 2018 10:00 AM CDT   Diabetes Education with RD DIABETES ED RESOURCE   [facility] ([facility])    [address] [place] [address]  [address]  [facility] [address] [phone]           [facility], [address] [place] [address], 7th floor, [place] [address]            Oct 12, 2018 11:00 AM CDT   MFM US COMPRE SINGLE F/U with [id number]   [facility] Ultrasound - [facility])    [address]  [facility] [address] [phone]           Wear comfortable clothes and leave your valuables at home.            Oct 12, 2018 11:30 AM CDT   Radiology MD with [facility]   [facility])    [address]  [facility] [address] [phone]           Please arrive at the time given for your first appointment. This visit is used internally to schedule the physician's time during your ultrasound.              Future tests that were ordered for you today     Open Future Orders        Priority Expected Expires Ordered    MFM US Comprehensive Single F/U Routine  2019            Who to contact     If you have questions or need follow up information about today's clinic visit or your schedule  please contact [facility] directly at [phone].  Normal or non-critical lab and imaging results will be communicated to you by MyChart, letter or phone within 4 business days after the clinic has received the results. If you do not hear from us within 7 days, please contact the clinic through MyChart or phone. If you have a critical or abnormal lab result, we will notify you by phone as soon as possible.  Submit refill requests through [employer] or call your pharmacy and they will forward the refill request to us. Please allow 3 business days for your refill to be completed.          Additional Information About Your Visit        Care EveryWhere ID     This is your Care EveryWhere ID. This could be used by other organizations to access your [facility] medical records  [id number]        Your Vitals Were     Last Period                   04/13/2018 (Exact Date)            Blood Pressure from Last 3 Encounters:   09/11/18 101/68   08/16/18 94/63   08/15/18 104/54    Weight from Last 3 Encounters:   09/11/18 66.2 kg (146 lb)   08/16/18 63.5 kg (140 lb)   08/15/18 64.1 kg (141 lb 4.8 oz)               Primary Care Provider Office Phone # Fax #    [facility] [phone]       [address] [place] [id number]        Equal Access to Services     CASSANDAR MCNEAL : Hadii dana hester hadasho Sotyler, waaxda luqadaha, qaybta kaalmada dalia, av riggins. So [facility] [phone].    ATENCIÓN: Si habla español, tiene a lopez disposición servicios gratuitos de asistencia lingüística. Llame al [phone].    We comply with applicable federal civil rights laws and [place] laws. We do not discriminate on the basis of race, color, national origin, age, disability, sex, sexual orientation, or gender identity.            Thank you!     Thank you for choosing [facility]  for your care. Our goal is always to  provide you with excellent care. Hearing back from our patients is one way we can continue to improve our services. Please take a few minutes to complete the written survey that you may receive in the mail after your visit with us. Thank you!             Your Updated Medication List - Protect others around you: Learn how to safely use, store and throw away your medicines at www.disposemymeds.org.          This list is accurate as of 9/19/18  3:49 PM.  Always use your most recent med list.                   Brand Name Dispense Instructions for use Diagnosis    albuterol 108 (90 Base) MCG/ACT inhaler    PROAIR HFA/PROVENTIL HFA/VENTOLIN HFA    1 Inhaler    Inhale 2 puffs into the lungs every 6 hours as needed for shortness of breath / dyspnea or wheezing        doxylamine 25 MG Tabs tablet    UNISOM    30 each    Take 1 tablet (25 mg) by mouth every 8 hours as needed For nausea        * Doxylamine-Pyridoxine 10-10 MG Tbec     30 tablet    Take 10 mg by mouth At Bedtime        * Doxylamine-Pyridoxine 10-10 MG Tbec     30 tablet    Take 10 mg by mouth At Bedtime        * Doxylamine-Pyridoxine 10-10 MG Tbec     30 tablet    Take 10 mg by mouth At Bedtime        * Doxylamine-Pyridoxine 10-10 MG Tbec     30 tablet    Take 10 mg by mouth At Bedtime        famotidine 20 MG tablet    PEPCID    60 tablet    Take 1 tablet (20 mg) by mouth 2 times daily    Heartburn in pregnancy in second trimester       nitroFURantoin (macrocrystal-monohydrate) 100 MG capsule    MACROBID    20 capsule    Take 1 capsule (100 mg) by mouth 2 times daily    Dysuria       prenatal multivitamin  with iron 28-0.8 MG Tabs     30 each    Take 1 tablet by mouth daily        pyridOXINE 25 MG tablet    vitamin B-6    30 tablet    Take 1 tablet (25 mg) by mouth every 8 hours as needed For nausea        * Notice:  This list has 4 medication(s) that are the same as other medications prescribed for you. Read the directions carefully, and ask your doctor or  other care provider to review them with you.

## 2018-09-19 NOTE — PROGRESS NOTES
Please see full imaging report from [facility] program under imaging tab.    Findings reviewed with Akua. She is still struggling with PO intake but feels like it is starting to get better. Given her complications in both this pregnancy and her prior pregnancies, I recommend ongoing close surveillance of fetal growth every 3-4 weeks. Can be spaced out if growth remains reassuring and she is doing well. Will need to reassess placenta at ~28-30 weeks.     Mel Newton MD  Maternal Fetal Medicine

## 2018-10-01 ENCOUNTER — TELEPHONE (OUTPATIENT)
Dept: MIDWIFE SERVICES | Facility: CLINIC | Age: 33
End: 2018-10-01

## 2018-10-01 NOTE — TELEPHONE ENCOUNTER
I don't see what makes her pregnancy high risk other than that she has had a previous CS and desires a TOLAC. Also, I am guessing that the diet modifications she is referring to were discussed with Sarah and in relation to the fact that she declined the one hour GCT and needs to check her blood sugar 4 times daily for two weeks. I would have Sarah review this patient's chart and write a letter if she feels that it is appropriate. Thanks, Ruma Orantes, BRAD

## 2018-10-01 NOTE — TELEPHONE ENCOUNTER
Pt is calling to request a letter stating that she has a high risk pregnancy.  Says she needs it for a visit with the county tomorrow.  Also asked about including information about having to modify her diet, which I didn't see any mention of except that she declined a 1 hr glucose so she needs to monitor blood sugar for 2 weeks instead.  Letter should be e-mailed to address on file and pt would like a phone call once it has been sent so she knows to look for the letter.  Are you able to write a letter for her today?  Corie Doll, RN

## 2018-10-01 NOTE — TELEPHONE ENCOUNTER
Pt is stating that she is high risk because she was told that by [facility] and that her baby was in the 27th %tile for growth.  I discussed with her that Sarah would need to write the letter and that she is not in the office until Wednesday.  She is requesting that you take another look at her chart and then call her to discuss further.  Corie Doll RN

## 2018-10-10 ENCOUNTER — PRENATAL OFFICE VISIT (OUTPATIENT)
Dept: MIDWIFE SERVICES | Facility: CLINIC | Age: 33
End: 2018-10-10
Payer: COMMERCIAL

## 2018-10-10 ENCOUNTER — HOSPITAL ENCOUNTER (OUTPATIENT)
Dept: ULTRASOUND IMAGING | Facility: CLINIC | Age: 33
Discharge: HOME | End: 2018-10-10
Attending: OBSTETRICS & GYNECOLOGY | Admitting: OBSTETRICS & GYNECOLOGY
Payer: COMMERCIAL

## 2018-10-10 ENCOUNTER — OFFICE VISIT (OUTPATIENT)
Dept: MATERNAL FETAL MEDICINE | Facility: CLINIC | Age: 33
End: 2018-10-10
Attending: OBSTETRICS & GYNECOLOGY
Payer: COMMERCIAL

## 2018-10-10 VITALS
BODY MASS INDEX: 27.07 KG/M2 | DIASTOLIC BLOOD PRESSURE: 76 MMHG | HEART RATE: 96 BPM | WEIGHT: 148 LBS | TEMPERATURE: 97.4 F | SYSTOLIC BLOOD PRESSURE: 112 MMHG

## 2018-10-10 DIAGNOSIS — D50.9 IRON DEFICIENCY ANEMIA, UNSPECIFIED IRON DEFICIENCY ANEMIA TYPE: ICD-10-CM

## 2018-10-10 DIAGNOSIS — O09.292 HISTORY OF IUGR (INTRAUTERINE GROWTH RETARDATION) AND STILLBIRTH, CURRENTLY PREGNANT, SECOND TRIMESTER: Primary | ICD-10-CM

## 2018-10-10 DIAGNOSIS — O44.42 LOW-LYING PLACENTA IN SECOND TRIMESTER: ICD-10-CM

## 2018-10-10 DIAGNOSIS — O44.40 LOW-LYING PLACENTA: ICD-10-CM

## 2018-10-10 DIAGNOSIS — Z36.89 ENCOUNTER FOR ULTRASOUND TO CHECK FETAL GROWTH: ICD-10-CM

## 2018-10-10 DIAGNOSIS — Z34.82 ENCOUNTER FOR SUPERVISION OF OTHER NORMAL PREGNANCY IN SECOND TRIMESTER: Primary | ICD-10-CM

## 2018-10-10 DIAGNOSIS — Z71.6 ENCOUNTER FOR SMOKING CESSATION COUNSELING: ICD-10-CM

## 2018-10-10 DIAGNOSIS — Z87.59 HISTORY OF PRIOR PREGNANCY WITH SGA NEWBORN: ICD-10-CM

## 2018-10-10 LAB
GLUCOSE 1H P 50 G GLC PO SERPL-MCNC: 104 MG/DL (ref 60–129)
HGB BLD-MCNC: 9.4 G/DL (ref 11.7–15.7)

## 2018-10-10 PROCEDURE — 00000218 ZZHCL STATISTIC OBHBG - HEMOGLOBIN: Performed by: ADVANCED PRACTICE MIDWIFE

## 2018-10-10 PROCEDURE — 99207 ZZC PRENATAL VISIT: CPT | Performed by: ADVANCED PRACTICE MIDWIFE

## 2018-10-10 PROCEDURE — 36415 COLL VENOUS BLD VENIPUNCTURE: CPT | Performed by: ADVANCED PRACTICE MIDWIFE

## 2018-10-10 PROCEDURE — 76816 OB US FOLLOW-UP PER FETUS: CPT

## 2018-10-10 PROCEDURE — 82950 GLUCOSE TEST: CPT | Performed by: ADVANCED PRACTICE MIDWIFE

## 2018-10-10 RX ORDER — FERROUS SULFATE 325(65) MG
325 TABLET ORAL
Qty: 60 TABLET | Refills: 2 | Status: ON HOLD | OUTPATIENT
Start: 2018-10-10 | End: 2019-01-16

## 2018-10-10 RX ORDER — NICOTINE 21 MG/24HR
1 PATCH, TRANSDERMAL 24 HOURS TRANSDERMAL EVERY 24 HOURS
Qty: 42 PATCH | Refills: 0 | Status: SHIPPED | OUTPATIENT
Start: 2018-10-10

## 2018-10-10 NOTE — MR AVS SNAPSHOT
After Visit Summary   10/10/2018    Akua Gr    MRN: [id number]           Patient Information     Date Of Birth          1985        Visit Information        Provider Department      10/10/2018 10:00 AM Libia Guido APRN CNM [facility]        Today's Diagnoses     Encounter for supervision of other normal pregnancy in second trimester    -  1    Low-lying placenta           Follow-ups after your visit        Your next 10 appointments already scheduled     Oct 10, 2018  2:15 PM CDT   MFM US COMPRE SINGLE F/U with [id number]   [facility] - [facility])    [address]  [facility] [address] [phone]           Wear comfortable clothes and leave your valuables at home.            Oct 10, 2018  2:45 PM CDT   Radiology MD with UR MFSANDRA LINTON   [facility] - [facility])    [address]  [facility] [id number] [phone]           Please arrive at the time given for your first appointment. This visit is used internally to schedule the physician's time during your ultrasound.              Who to contact     If you have questions or need follow up information about today's clinic visit or your schedule please contact [facility] directly at [phone].  Normal or non-critical lab and imaging results will be communicated to you by MyChart, letter or phone within 4 business days after the clinic has received the results. If you do not hear from us within 7 days, please contact the clinic through MyChart or phone. If you have a critical or abnormal lab result, we will notify you by phone as soon as possible.  Submit refill requests through [employer] or call your pharmacy and they will forward the refill request to us. Please allow 3 business days for your refill to be completed.          Additional  Information About Your Visit        Care EveryWhere ID     This is your Care EveryWhere ID. This could be used by other organizations to access your [facility] medical records  [id number]        Your Vitals Were     Pulse Temperature Last Period BMI (Body Mass Index)          96 97.4  F (36.3  C) (Oral) 04/13/2018 (Exact Date) 27.07 kg/m2         Blood Pressure from Last 3 Encounters:   10/10/18 112/76   09/11/18 101/68   08/16/18 94/63    Weight from Last 3 Encounters:   10/10/18 148 lb (67.1 kg)   09/11/18 146 lb (66.2 kg)   08/16/18 140 lb (63.5 kg)              We Performed the Following     Glucose tolerance gest screen 1 hour     OB hemoglobin        Primary Care Provider Office Phone # Fax #    [facility] [phone]       [address] [facility] [address]  [facility] [address]        Equal Access to Services     CASSANDRA MCNEAL : Hadii dana ku hadasho Soveronicaali, waaxda luqadaha, qaybta kaalmada adeegyada, waxchante brockin hayrosan celso tony . So [facility] [phone].    ATENCIÓN: Si habla español, tiene a lopez disposición servicios gratuitos de asistencia lingüística. Llame al [phone].    We comply with applicable federal civil rights laws and [place] laws. We do not discriminate on the basis of race, color, national origin, age, disability, sex, sexual orientation, or gender identity.            Thank you!     Thank you for choosing [facility]  for your care. Our goal is always to provide you with excellent care. Hearing back from our patients is one way we can continue to improve our services. Please take a few minutes to complete the written survey that you may receive in the mail after your visit with us. Thank you!             Your Updated Medication List - Protect others around you: Learn how to safely use, store and throw away your medicines at www.disposemymeds.org.          This list is accurate as of 10/10/18 10:24 AM.  Always use your most recent med list.                    Brand Name Dispense Instructions for use Diagnosis    albuterol 108 (90 Base) MCG/ACT inhaler    PROAIR HFA/PROVENTIL HFA/VENTOLIN HFA    1 Inhaler    Inhale 2 puffs into the lungs every 6 hours as needed for shortness of breath / dyspnea or wheezing        doxylamine 25 MG Tabs tablet    UNISOM    30 each    Take 1 tablet (25 mg) by mouth every 8 hours as needed For nausea        Doxylamine-Pyridoxine 10-10 MG Tbec     30 tablet    Take 10 mg by mouth At Bedtime        famotidine 20 MG tablet    PEPCID    60 tablet    Take 1 tablet (20 mg) by mouth 2 times daily    Heartburn in pregnancy in second trimester       nitroFURantoin (macrocrystal-monohydrate) 100 MG capsule    MACROBID    20 capsule    Take 1 capsule (100 mg) by mouth 2 times daily    Dysuria       prenatal multivitamin  with iron 28-0.8 MG Tabs     30 each    Take 1 tablet by mouth daily        pyridOXINE 25 MG tablet    vitamin B-6    30 tablet    Take 1 tablet (25 mg) by mouth every 8 hours as needed For nausea

## 2018-10-10 NOTE — LETTER
October 19, 2018      Akua Gr  [address] [facility] [address]        Dear ,    We are writing to inform you of your test results.    Your hemoglobin level is low so I have ordered an iron supplement for you.  Please take 1 tablet every day.  The prescription has been sent to the [facility].    Resulted Orders   OB hemoglobin   Result Value Ref Range    Hemoglobin 9.4 (L) 11.7 - 15.7 g/dL   Glucose tolerance gest screen 1 hour   Result Value Ref Range    Glu Gest Screen 1hr 50g 104 60 - 129 mg/dL       If you have any questions or concerns, please call the clinic at [phone].       Sincerely,        CHICHI Lindsay CNM

## 2018-10-10 NOTE — PROGRESS NOTES
25w5d  Patient feeling well. Positive fetal movement. Denies water leaking, vaginal bleeding, decreased fetal movement, contraction pain, or headaches.   Asking to review her ultrasound with [facility] - ultrasound WNL, would like repeat for growth monthly throughout pregnancy, patient was aware of that and okay with that plan. Discussed placenta is low lying and need for pelvic rest and to call with any bleeding. JERSON is following low lying placenta with growths.   She would like TOLAC, discussed she is a great candidate. She is going to meet with the physicians next visit. She would like a water birth, discussed she cannot due to history of  which she understands.   Declines flu vaccination, discussed why it is recommended, declines.   Discussed smoking. Not cutting back on cigarettes, increasing use slightly to about 5-7 cigs per day. Interested in quitting and asking about options. She is willing to try patches. Discussed quitting smoking while using the patch and process. RX sent.   GCT and HGB today.   Danger signs reviewed, pre-eclampsia signs and symptoms discussed.   Knows when to call triage and has phone numbers.   Follow up in 4 weeks.   Libia Guido CNM

## 2018-10-10 NOTE — PROGRESS NOTES
"Please see \"Imaging\" tab under \"Chart Review\" for details of today's US at the [facility].    Simeon Thomas MD  Maternal-Fetal Medicine      "

## 2018-10-11 ASSESSMENT — PATIENT HEALTH QUESTIONNAIRE - PHQ9: SUM OF ALL RESPONSES TO PHQ QUESTIONS 1-9: 6

## 2018-10-31 ENCOUNTER — PRENATAL OFFICE VISIT (OUTPATIENT)
Dept: MIDWIFE SERVICES | Facility: CLINIC | Age: 33
End: 2018-10-31
Payer: COMMERCIAL

## 2018-10-31 VITALS
DIASTOLIC BLOOD PRESSURE: 69 MMHG | BODY MASS INDEX: 26.89 KG/M2 | SYSTOLIC BLOOD PRESSURE: 128 MMHG | WEIGHT: 147 LBS | HEART RATE: 60 BPM

## 2018-10-31 DIAGNOSIS — O44.40 LOW-LYING PLACENTA: ICD-10-CM

## 2018-10-31 DIAGNOSIS — Z34.83 ENCOUNTER FOR SUPERVISION OF OTHER NORMAL PREGNANCY IN THIRD TRIMESTER: Primary | ICD-10-CM

## 2018-10-31 DIAGNOSIS — Z23 NEED FOR TDAP VACCINATION: ICD-10-CM

## 2018-10-31 DIAGNOSIS — R12 HEART BURN: ICD-10-CM

## 2018-10-31 PROCEDURE — 90715 TDAP VACCINE 7 YRS/> IM: CPT | Performed by: ADVANCED PRACTICE MIDWIFE

## 2018-10-31 PROCEDURE — 99207 ZZC PRENATAL VISIT: CPT | Performed by: ADVANCED PRACTICE MIDWIFE

## 2018-10-31 PROCEDURE — 90471 IMMUNIZATION ADMIN: CPT | Performed by: ADVANCED PRACTICE MIDWIFE

## 2018-10-31 RX ORDER — FAMOTIDINE 20 MG/1
20 TABLET, FILM COATED ORAL 2 TIMES DAILY
Qty: 60 TABLET | Refills: 1 | Status: ON HOLD | OUTPATIENT
Start: 2018-10-31 | End: 2019-01-16

## 2018-10-31 NOTE — PROGRESS NOTES
28w5d  Patient feeling well. Positive fetal movement. Denies water leaking, vaginal bleeding, decreased fetal movement, contraction pain, or headaches.   Overall doing okay but having a lot of discomforts. She is reporting back pain, mostly after moving around a lot throughout the day, cramping a few times per day, and increased mucus discharge which is making her concerned about losing her mucus plug. After discussion about her symptoms seems related to normal pregnancy discomforts. She is unable to rest really throughout the day because she cares for her children. We discussed no need for bed rest. We discussed comfort measures and ways to help and signs to watch for  labor. She will get a maternity support belt today. Cervix was L/C/P.   We also discussed her low lying placenta and the process for that the rest of the pregnancy. She has a follow up ultrasound with M tomorrow. If placenta moves out of the way she still needs a TOLAC consent done. She has hired a  who is going to come to the rest of her appointments. She asked about incontinence when sneezing. Discussed stress incontinence with pregnancy and due to her having multiple children. Recommendation to do pelvic floor physical therapy after the baby to help.    Tdap vaccination today. Declined flu vaccination.   Danger signs reviewed, pre-eclampsia signs and symptoms discussed.   Knows when to call triage and has phone numbers.   Follow up in 4 weeks.   Libia Guido CNM

## 2018-10-31 NOTE — MR AVS SNAPSHOT
After Visit Summary   10/31/2018    Akua Gr    MRN: [id number]           Patient Information     Date Of Birth          1985        Visit Information        Provider Department      10/31/2018 11:30 AM Libia Guido APRN CNM [facility]        Today's Diagnoses     Encounter for supervision of other normal pregnancy in third trimester    -  1    Need for Tdap vaccination        Heart burn        Low-lying placenta           Follow-ups after your visit        Your next 10 appointments already scheduled     Nov 01, 2018 10:15 AM CDT   MFM US COMPRE SINGLE F/U with [id number]   [facility] Maternal Fetal Medicine Ultrasound - [facility])    [address]  [facility] [address] [phone]           Wear comfortable clothes and leave your valuables at home.            Nov 01, 2018 10:45 AM CDT   Radiology MD with UR JERSON LINTON   [facility] Maternal Fetal Medicine - [facility])    [address]  [facility] [address]   [phone]           Please arrive at the time given for your first appointment. This visit is used internally to schedule the physician's time during your ultrasound.              Who to contact     If you have questions or need follow up information about today's clinic visit or your schedule please contact [facility] directly at [phone].  Normal or non-critical lab and imaging results will be communicated to you by MyChart, letter or phone within 4 business days after the clinic has received the results. If you do not hear from us within 7 days, please contact the clinic through MyChart or phone. If you have a critical or abnormal lab result, we will notify you by phone as soon as possible.  Submit refill requests through [employer] or call your pharmacy and they will forward the refill request to us. Please allow 3 business days for  your refill to be completed.          Additional Information About Your Visit        Care EveryWhere ID     This is your Care EveryWhere ID. This could be used by other organizations to access your [place] medical records  [id number]        Your Vitals Were     Pulse Last Period BMI (Body Mass Index)             60 04/13/2018 (Exact Date) 26.89 kg/m2          Blood Pressure from Last 3 Encounters:   10/31/18 128/69   10/10/18 112/76   09/11/18 101/68    Weight from Last 3 Encounters:   10/31/18 147 lb (66.7 kg)   10/10/18 148 lb (67.1 kg)   09/11/18 146 lb (66.2 kg)              We Performed the Following     TDAP, IM (10 - 64 YRS) - Adacel          Today's Medication Changes          These changes are accurate as of 10/31/18 12:27 PM.  If you have any questions, ask your nurse or doctor.               Start taking these medicines.        Dose/Directions    order for DME   Used for:  Encounter for supervision of other normal pregnancy in third trimester   Started by:  Libia Guido APRN CNM        Equipment being ordered: maternity support belt   Quantity:  1 Device   Refills:  0         These medicines have changed or have updated prescriptions.        Dose/Directions    * famotidine 20 MG tablet   Commonly known as:  PEPCID   This may have changed:  Another medication with the same name was added. Make sure you understand how and when to take each.   Used for:  Heartburn in pregnancy in second trimester   Changed by:  Libia Guido APRN CNM        Dose:  20 mg   Take 1 tablet (20 mg) by mouth 2 times daily   Quantity:  60 tablet   Refills:  1       * famotidine 20 MG tablet   Commonly known as:  PEPCID   This may have changed:  You were already taking a medication with the same name, and this prescription was added. Make sure you understand how and when to take each.   Used for:  Heart burn   Changed by:  Libia Guido APRN CNM        Dose:  20 mg   Take 1 tablet (20 mg) by mouth 2 times  daily   Quantity:  60 tablet   Refills:  1       * Notice:  This list has 2 medication(s) that are the same as other medications prescribed for you. Read the directions carefully, and ask your doctor or other care provider to review them with you.         Where to get your medicines      These medications were sent to [id number] - [place], [place] - [address] AT [facility] [address]  [address] [facility], [facility] [address]     Phone:  [phone]     famotidine 20 MG tablet         Some of these will need a paper prescription and others can be bought over the counter.  Ask your nurse if you have questions.     You don't need a prescription for these medications     order for DME                Primary Care Provider Office Phone # Fax #    [facility] [phone]       [address] [address] [address]        Equal Access to Services     CASSANDRA MCNEAL AH: Hadii dana hester hadasho Soomaali, waaxda luqadaha, qaybta kaalmada adeegyada, waxay delmyin hayaan adeeunice tony . So [facility] [phone].    ATENCIÓN: Si habla español, tiene a lopez disposición servicios gratuitos de asistencia lingüística. Brianda al [phone].    We comply with applicable federal civil rights laws and [place] laws. We do not discriminate on the basis of race, color, national origin, age, disability, sex, sexual orientation, or gender identity.            Thank you!     Thank you for choosing [facility]  for your care. Our goal is always to provide you with excellent care. Hearing back from our patients is one way we can continue to improve our services. Please take a few minutes to complete the written survey that you may receive in the mail after your visit with us. Thank you!             Your Updated Medication List - Protect others around you: Learn how to safely use, store and throw away your medicines at www.disposemymeds.org.          This list is  accurate as of 10/31/18 12:27 PM.  Always use your most recent med list.                   Brand Name Dispense Instructions for use Diagnosis    albuterol 108 (90 Base) MCG/ACT inhaler    PROAIR HFA/PROVENTIL HFA/VENTOLIN HFA    1 Inhaler    Inhale 2 puffs into the lungs every 6 hours as needed for shortness of breath / dyspnea or wheezing        doxylamine 25 MG Tabs tablet    UNISOM    30 each    Take 1 tablet (25 mg) by mouth every 8 hours as needed For nausea        Doxylamine-Pyridoxine 10-10 MG Tbec     30 tablet    Take 10 mg by mouth At Bedtime        * famotidine 20 MG tablet    PEPCID    60 tablet    Take 1 tablet (20 mg) by mouth 2 times daily    Heartburn in pregnancy in second trimester       * famotidine 20 MG tablet    PEPCID    60 tablet    Take 1 tablet (20 mg) by mouth 2 times daily    Heart burn       ferrous sulfate 325 (65 Fe) MG tablet    IRON    60 tablet    Take 1 tablet (325 mg) by mouth daily (with breakfast)    Iron deficiency anemia, unspecified iron deficiency anemia type       * nicotine 14 MG/24HR 24 hr patch    NICODERM CQ    42 patch    Place 1 patch onto the skin every 24 hours    Encounter for smoking cessation counseling       * nicotine 7 MG/24HR 24 hr patch    NICODERM CQ    14 patch    Place 1 patch onto the skin every 24 hours    Encounter for smoking cessation counseling       nitroFURantoin (macrocrystal-monohydrate) 100 MG capsule    MACROBID    20 capsule    Take 1 capsule (100 mg) by mouth 2 times daily    Dysuria       order for DME     1 Device    Equipment being ordered: maternity support belt    Encounter for supervision of other normal pregnancy in third trimester       prenatal multivitamin  with iron 28-0.8 MG Tabs     30 each    Take 1 tablet by mouth daily        pyridOXINE 25 MG tablet    vitamin B-6    30 tablet    Take 1 tablet (25 mg) by mouth every 8 hours as needed For nausea        * Notice:  This list has 4 medication(s) that are the same as other  medications prescribed for you. Read the directions carefully, and ask your doctor or other care provider to review them with you.

## 2018-11-07 ENCOUNTER — OFFICE VISIT (OUTPATIENT)
Dept: MATERNAL FETAL MEDICINE | Facility: CLINIC | Age: 33
End: 2018-11-07
Attending: OBSTETRICS & GYNECOLOGY
Payer: COMMERCIAL

## 2018-11-07 ENCOUNTER — HOSPITAL ENCOUNTER (OUTPATIENT)
Dept: ULTRASOUND IMAGING | Facility: CLINIC | Age: 33
Discharge: HOME | End: 2018-11-07
Attending: OBSTETRICS & GYNECOLOGY | Admitting: OBSTETRICS & GYNECOLOGY
Payer: COMMERCIAL

## 2018-11-07 DIAGNOSIS — Z36.89 ENCOUNTER FOR ULTRASOUND TO CHECK FETAL GROWTH: ICD-10-CM

## 2018-11-07 DIAGNOSIS — O44.40 LOW-LYING PLACENTA: Primary | ICD-10-CM

## 2018-11-07 PROCEDURE — 76816 OB US FOLLOW-UP PER FETUS: CPT

## 2018-11-07 PROCEDURE — 76817 TRANSVAGINAL US OBSTETRIC: CPT | Performed by: OBSTETRICS & GYNECOLOGY

## 2018-11-07 NOTE — MR AVS SNAPSHOT
After Visit Summary   11/7/2018    Akua Gr    MRN: [id number]           Patient Information     Date Of Birth          1985        Visit Information        Provider Department      11/7/2018 10:45 AM Randall Salazar MD [facility] Maternal Fetal Medicine [facility]        Today's Diagnoses     Low-lying placenta    -  1    Encounter for ultrasound to check fetal growth           Follow-ups after your visit        Your next 10 appointments already scheduled     Nov 29, 2018 10:15 AM CST   MFM US COMPRE SINGLE F/U with [id number]   [facility] Maternal Fetal Medicine Ultrasound - [facility])    [address]  [facility] [place] [address]   [phone]           Wear comfortable clothes and leave your valuables at home.            Nov 29, 2018 10:45 AM CST   Radiology MD with UR JERSON LINTON   [facility] Maternal Fetal Medicine - [facility])    [address]  [facility] [address]   [phone]           Please arrive at the time given for your first appointment. This visit is used internally to schedule the physician's time during your ultrasound.              Future tests that were ordered for you today     Open Future Orders        Priority Expected Expires Ordered    MFM US Comprehensive Single F/U Routine  11/7/2019 11/7/2018            Who to contact     If you have questions or need follow up information about today's clinic visit or your schedule please contact [facility] MATERNAL FETAL MEDICINE [facility] directly at [phone].  Normal or non-critical lab and imaging results will be communicated to you by MyChart, letter or phone within 4 business days after the clinic has received the results. If you do not hear from us within 7 days, please contact the clinic through MyChart or phone. If you have a critical or abnormal lab result, we will notify you by phone as soon as possible.  Submit  refill requests through [employer] or call your pharmacy and they will forward the refill request to us. Please allow 3 business days for your refill to be completed.          Additional Information About Your Visit        Care EveryWhere ID     This is your Care EveryWhere ID. This could be used by other organizations to access your [place] medical records  [id number]        Your Vitals Were     Last Period                   04/13/2018 (Exact Date)            Blood Pressure from Last 3 Encounters:   10/31/18 128/69   10/10/18 112/76   09/11/18 101/68    Weight from Last 3 Encounters:   10/31/18 66.7 kg (147 lb)   10/10/18 67.1 kg (148 lb)   09/11/18 66.2 kg (146 lb)               Primary Care Provider Office Phone # Fax #    [facility] [phone]       [address] [address]        Equal Access to Services     CASSANDRA MCNEAL : Hadii aad ku hadasho Soomaali, waaxda luqadaha, qaybta kaalmada adeegyada, waxay idiin hayaan celso tony . So [facility] [phone].    ATENCIÓN: Si habla español, tiene a lopez disposición servicios gratuitos de asistencia lingüística. Brianda al [phone].    We comply with applicable federal civil rights laws and [place] laws. We do not discriminate on the basis of race, color, national origin, age, disability, sex, sexual orientation, or gender identity.            Thank you!     Thank you for choosing [facility]  for your care. Our goal is always to provide you with excellent care. Hearing back from our patients is one way we can continue to improve our services. Please take a few minutes to complete the written survey that you may receive in the mail after your visit with us. Thank you!             Your Updated Medication List - Protect others around you: Learn how to safely use, store and throw away your medicines at www.disposemymeds.org.          This list is accurate as of 11/7/18  1:16 PM.   Always use your most recent med list.                   Brand Name Dispense Instructions for use Diagnosis    albuterol 108 (90 Base) MCG/ACT inhaler    PROAIR HFA/PROVENTIL HFA/VENTOLIN HFA    1 Inhaler    Inhale 2 puffs into the lungs every 6 hours as needed for shortness of breath / dyspnea or wheezing        doxylamine 25 MG Tabs tablet    UNISOM    30 each    Take 1 tablet (25 mg) by mouth every 8 hours as needed For nausea        Doxylamine-Pyridoxine 10-10 MG Tbec     30 tablet    Take 10 mg by mouth At Bedtime        * famotidine 20 MG tablet    PEPCID    60 tablet    Take 1 tablet (20 mg) by mouth 2 times daily    Heartburn in pregnancy in second trimester       * famotidine 20 MG tablet    PEPCID    60 tablet    Take 1 tablet (20 mg) by mouth 2 times daily    Heart burn       ferrous sulfate 325 (65 Fe) MG tablet    IRON    60 tablet    Take 1 tablet (325 mg) by mouth daily (with breakfast)    Iron deficiency anemia, unspecified iron deficiency anemia type       * nicotine 14 MG/24HR 24 hr patch    NICODERM CQ    42 patch    Place 1 patch onto the skin every 24 hours    Encounter for smoking cessation counseling       * nicotine 7 MG/24HR 24 hr patch    NICODERM CQ    14 patch    Place 1 patch onto the skin every 24 hours    Encounter for smoking cessation counseling       nitroFURantoin (macrocrystal-monohydrate) 100 MG capsule    MACROBID    20 capsule    Take 1 capsule (100 mg) by mouth 2 times daily    Dysuria       order for DME     1 Device    Equipment being ordered: maternity support belt    Encounter for supervision of other normal pregnancy in third trimester       prenatal multivitamin  with iron 28-0.8 MG Tabs     30 each    Take 1 tablet by mouth daily        pyridOXINE 25 MG tablet    vitamin B-6    30 tablet    Take 1 tablet (25 mg) by mouth every 8 hours as needed For nausea        * Notice:  This list has 4 medication(s) that are the same as other medications prescribed for you. Read the  directions carefully, and ask your doctor or other care provider to review them with you.

## 2018-11-07 NOTE — PROGRESS NOTES
Please refer to ultrasound report under 'Imaging' Studies of 'Chart Review' tabs.    Randall Salazar M.D.

## 2018-11-08 ENCOUNTER — TELEPHONE (OUTPATIENT)
Dept: MIDWIFE SERVICES | Facility: CLINIC | Age: 33
End: 2018-11-08

## 2018-11-08 DIAGNOSIS — N89.8 VAGINAL ITCHING: Primary | ICD-10-CM

## 2018-11-08 NOTE — TELEPHONE ENCOUNTER
Patient calling regarding yeast infection. Has itching and some discharge - used a little bit of Monistat that she has left over and it helped - would like an rx sent in for Monistat to pharmacy that is teed up. Please send if okay. Thanks!  Leann Lizarraga

## 2018-11-29 ENCOUNTER — HOSPITAL ENCOUNTER (OUTPATIENT)
Dept: ULTRASOUND IMAGING | Facility: CLINIC | Age: 33
Discharge: HOME | End: 2018-11-29
Attending: OBSTETRICS & GYNECOLOGY | Admitting: SOCIAL WORKER
Payer: COMMERCIAL

## 2018-11-29 ENCOUNTER — PRENATAL OFFICE VISIT (OUTPATIENT)
Dept: MIDWIFE SERVICES | Facility: CLINIC | Age: 33
End: 2018-11-29
Payer: COMMERCIAL

## 2018-11-29 ENCOUNTER — OFFICE VISIT (OUTPATIENT)
Dept: MATERNAL FETAL MEDICINE | Facility: CLINIC | Age: 33
End: 2018-11-29
Attending: OBSTETRICS & GYNECOLOGY
Payer: COMMERCIAL

## 2018-11-29 VITALS
WEIGHT: 147 LBS | HEART RATE: 75 BPM | TEMPERATURE: 97.1 F | SYSTOLIC BLOOD PRESSURE: 115 MMHG | BODY MASS INDEX: 26.89 KG/M2 | DIASTOLIC BLOOD PRESSURE: 67 MMHG

## 2018-11-29 DIAGNOSIS — B96.89 BACTERIAL VAGINOSIS: Primary | ICD-10-CM

## 2018-11-29 DIAGNOSIS — Z87.59 HISTORY OF PRIOR PREGNANCY WITH SGA NEWBORN: ICD-10-CM

## 2018-11-29 DIAGNOSIS — N89.8 VAGINAL DISCHARGE: ICD-10-CM

## 2018-11-29 DIAGNOSIS — N76.0 BACTERIAL VAGINOSIS: Primary | ICD-10-CM

## 2018-11-29 DIAGNOSIS — O44.40 LOW-LYING PLACENTA: Primary | ICD-10-CM

## 2018-11-29 DIAGNOSIS — O34.219 HISTORY OF CESAREAN DELIVERY AFFECTING PREGNANCY: Primary | ICD-10-CM

## 2018-11-29 LAB
ALBUMIN UR-MCNC: NEGATIVE MG/DL
APPEARANCE UR: CLEAR
BACTERIA #/AREA URNS HPF: ABNORMAL /HPF
BILIRUB UR QL STRIP: NEGATIVE
COLOR UR AUTO: YELLOW
GLUCOSE UR STRIP-MCNC: NEGATIVE MG/DL
HGB UR QL STRIP: NEGATIVE
KETONES UR STRIP-MCNC: ABNORMAL MG/DL
LEUKOCYTE ESTERASE UR QL STRIP: NEGATIVE
NITRATE UR QL: NEGATIVE
NON-SQ EPI CELLS #/AREA URNS LPF: ABNORMAL /LPF
PH UR STRIP: 6.5 PH (ref 5–7)
RBC #/AREA URNS AUTO: ABNORMAL /HPF
SOURCE: ABNORMAL
SP GR UR STRIP: 1.02 (ref 1–1.03)
SPECIMEN SOURCE: ABNORMAL
UROBILINOGEN UR STRIP-ACNC: 1 EU/DL (ref 0.2–1)
WBC #/AREA URNS AUTO: ABNORMAL /HPF
WET PREP SPEC: ABNORMAL

## 2018-11-29 PROCEDURE — 99213 OFFICE O/P EST LOW 20 MIN: CPT | Performed by: ADVANCED PRACTICE MIDWIFE

## 2018-11-29 PROCEDURE — 76816 OB US FOLLOW-UP PER FETUS: CPT

## 2018-11-29 PROCEDURE — 76817 TRANSVAGINAL US OBSTETRIC: CPT

## 2018-11-29 PROCEDURE — 87210 SMEAR WET MOUNT SALINE/INK: CPT | Performed by: ADVANCED PRACTICE MIDWIFE

## 2018-11-29 PROCEDURE — 81001 URINALYSIS AUTO W/SCOPE: CPT | Performed by: ADVANCED PRACTICE MIDWIFE

## 2018-11-29 RX ORDER — METRONIDAZOLE 500 MG/1
500 TABLET ORAL 2 TIMES DAILY
Qty: 14 TABLET | Refills: 0 | Status: SHIPPED | OUTPATIENT
Start: 2018-11-29 | End: 2018-12-06

## 2018-11-29 NOTE — MR AVS SNAPSHOT
After Visit Summary   2018    Akua Gr    MRN: [id number]           Patient Information     Date Of Birth          1985        Visit Information        Provider Department      2018 11:15 AM Ruma Orantes APRN CNM [facility]        Today's Diagnoses     History of  delivery affecting pregnancy    -  1    Vaginal discharge           Follow-ups after your visit        Your next 10 appointments already scheduled     Dec 06, 2018 10:15 AM CST   ESTABLISHED PRENATAL with Lolis Pickett MD   [facility]    [address] [place] [address] [phone]              Who to contact     If you have questions or need follow up information about today's clinic visit or your schedule please contact [facility] directly at [phone].  Normal or non-critical lab and imaging results will be communicated to you by MyChart, letter or phone within 4 business days after the clinic has received the results. If you do not hear from us within 7 days, please contact the clinic through MyChart or phone. If you have a critical or abnormal lab result, we will notify you by phone as soon as possible.  Submit refill requests through [employer] or call your pharmacy and they will forward the refill request to us. Please allow 3 business days for your refill to be completed.          Additional Information About Your Visit        Care EveryWhere ID     This is your Care EveryWhere ID. This could be used by other organizations to access your [place] medical records  [id number]        Your Vitals Were     Pulse Temperature Last Period BMI (Body Mass Index)          75 97.1  F (36.2  C) (Oral) 2018 (Exact Date) 26.89 kg/m2         Blood Pressure from Last 3 Encounters:   18 115/67   10/31/18 128/69   10/10/18 112/76    Weight from Last 3 Encounters:   18 147 lb (66.7  kg)   10/31/18 147 lb (66.7 kg)   10/10/18 148 lb (67.1 kg)              We Performed the Following     UA with Microscopic reflex to Culture     Wet prep        Primary Care Provider Office Phone # Fax #    [facility] [phone]       [address] [address]        Equal Access to Services     CASSANDRA MCNEAL : Hadii aad ku hadasho Soomaali, waaxda luqadaha, qaybta kaalmada adeegyada, waxay idiin hayaan adeeg kharash la'aan ah. So [facility] [phone].    ATENCIÓN: Si habla español, tiene a lopez disposición servicios gratuitos de asistencia lingüística. Brianda al [phone].    We comply with applicable federal civil rights laws and [place] laws. We do not discriminate on the basis of race, color, national origin, age, disability, sex, sexual orientation, or gender identity.            Thank you!     Thank you for choosing [facility]  for your care. Our goal is always to provide you with excellent care. Hearing back from our patients is one way we can continue to improve our services. Please take a few minutes to complete the written survey that you may receive in the mail after your visit with us. Thank you!             Your Updated Medication List - Protect others around you: Learn how to safely use, store and throw away your medicines at www.disposemymeds.org.          This list is accurate as of 11/29/18 11:59 AM.  Always use your most recent med list.                   Brand Name Dispense Instructions for use Diagnosis    albuterol 108 (90 Base) MCG/ACT inhaler    PROAIR HFA/PROVENTIL HFA/VENTOLIN HFA    1 Inhaler    Inhale 2 puffs into the lungs every 6 hours as needed for shortness of breath / dyspnea or wheezing        doxylamine 25 MG Tabs tablet    UNISOM    30 each    Take 1 tablet (25 mg) by mouth every 8 hours as needed For nausea        Doxylamine-Pyridoxine 10-10 MG Tbec     30 tablet    Take 10 mg by mouth At Bedtime        *  famotidine 20 MG tablet    PEPCID    60 tablet    Take 1 tablet (20 mg) by mouth 2 times daily    Heartburn in pregnancy in second trimester       * famotidine 20 MG tablet    PEPCID    60 tablet    Take 1 tablet (20 mg) by mouth 2 times daily    Heart burn       ferrous sulfate 325 (65 Fe) MG tablet    FEROSUL    60 tablet    Take 1 tablet (325 mg) by mouth daily (with breakfast)    Iron deficiency anemia, unspecified iron deficiency anemia type       * nicotine 14 MG/24HR 24 hr patch    NICODERM CQ    42 patch    Place 1 patch onto the skin every 24 hours    Encounter for smoking cessation counseling       * nicotine 7 MG/24HR 24 hr patch    NICODERM CQ    14 patch    Place 1 patch onto the skin every 24 hours    Encounter for smoking cessation counseling       nitroFURantoin macrocrystal-monohydrate 100 MG capsule    MACROBID    20 capsule    Take 1 capsule (100 mg) by mouth 2 times daily    Dysuria       order for DME     1 Device    Equipment being ordered: maternity support belt    Encounter for supervision of other normal pregnancy in third trimester       prenatal multivitamin  with iron 28-0.8 MG Tabs     30 each    Take 1 tablet by mouth daily        vitamin B6 25 MG tablet    pyridOXINE    30 tablet    Take 1 tablet (25 mg) by mouth every 8 hours as needed For nausea        * Notice:  This list has 4 medication(s) that are the same as other medications prescribed for you. Read the directions carefully, and ask your doctor or other care provider to review them with you.

## 2018-11-29 NOTE — PROGRESS NOTES
Please see ultrasound report under imaging tab for details on ultrasound performed today.    Lolis Muhammad MD  , OB/GYN  Maternal-Fetal Medicine  [email or web address]  [phone] (Academic office)  [phone] (Pager)

## 2018-11-29 NOTE — MR AVS SNAPSHOT
After Visit Summary   2018    Akua Gr    MRN: [id number]           Patient Information     Date Of Birth          1985        Visit Information        Provider Department      2018 10:45 AM Lolis Muhammad MD [facility] Maternal Fetal Medicine [facility]        Today's Diagnoses     Low-lying placenta    -  1    History of prior pregnancy with SGA            Follow-ups after your visit        Your next 10 appointments already scheduled     Dec 06, 2018 10:15 AM CST   ESTABLISHED PRENATAL with Lolis Pickett MD   [facility] ([facility])    [address]  [address]  [facility] [id number] [phone]            Dec 27, 2018 10:15 AM CST   MFM US COMPRE SINGLE F/U with [id number]   [facility] Maternal Fetal Medicine Ultrasound - [facility])    [address]  [facility] [address] [phone]           Wear comfortable clothes and leave your valuables at home.            Dec 27, 2018 10:45 AM CST   Radiology MD with UR JERSON LINTON   [facility] Maternal Fetal Medicine - [facility])    [address]  [facility] [address] [phone]           Please arrive at the time given for your first appointment. This visit is used internally to schedule the physician's time during your ultrasound.              Future tests that were ordered for you today     Open Future Orders        Priority Expected Expires Ordered    MFM US Comprehensive Single F/U Routine 2018            Who to contact     If you have questions or need follow up information about today's clinic visit or your schedule please contact [facility] MATERNAL FETAL MEDICINE [facility] directly at [phone].  Normal or non-critical lab and imaging results will be communicated to you by MyChart, letter or phone within 4 business days  after the clinic has received the results. If you do not hear from us within 7 days, please contact the clinic through hyaqut or phone. If you have a critical or abnormal lab result, we will notify you by phone as soon as possible.  Submit refill requests through Pentaho or call your pharmacy and they will forward the refill request to us. Please allow 3 business days for your refill to be completed.          Additional Information About Your Visit        Care EveryWhere ID     This is your Care EveryWhere ID. This could be used by other organizations to access your [facility] medical records  [id number]        Your Vitals Were     Last Period                   04/13/2018 (Exact Date)            Blood Pressure from Last 3 Encounters:   11/29/18 115/67   10/31/18 128/69   10/10/18 112/76    Weight from Last 3 Encounters:   11/29/18 66.7 kg (147 lb)   10/31/18 66.7 kg (147 lb)   10/10/18 67.1 kg (148 lb)              We Performed the Following     [facility]        Primary Care Provider Office Phone # Fax #    [facility] [phone]       [address] [facility] [address]  [employer] [address]        Equal Access to Services     CASSANDRA MCNEAL : Hadii dana flemingo Sotyler, waaxda luqadaha, qaybta kaalmada adeegyada, va riggins. So [facility] [phone].    ATENCIÓN: Si habla español, tiene a lopez disposición servicios gratuitos de asistencia lingüística. Brianda al [phone].    We comply with applicable federal civil rights laws and [place] laws. We do not discriminate on the basis of race, color, national origin, age, disability, sex, sexual orientation, or gender identity.            Thank you!     Thank you for choosing [facility]  for your care. Our goal is always to provide you with excellent care. Hearing back from our patients is one way we can continue to improve our services. Please take a few minutes to  complete the written survey that you may receive in the mail after your visit with us. Thank you!             Your Updated Medication List - Protect others around you: Learn how to safely use, store and throw away your medicines at www.disposemymeds.org.          This list is accurate as of 11/29/18  1:59 PM.  Always use your most recent med list.                   Brand Name Dispense Instructions for use Diagnosis    albuterol 108 (90 Base) MCG/ACT inhaler    PROAIR HFA/PROVENTIL HFA/VENTOLIN HFA    1 Inhaler    Inhale 2 puffs into the lungs every 6 hours as needed for shortness of breath / dyspnea or wheezing        doxylamine 25 MG Tabs tablet    UNISOM    30 each    Take 1 tablet (25 mg) by mouth every 8 hours as needed For nausea        Doxylamine-Pyridoxine 10-10 MG Tbec     30 tablet    Take 10 mg by mouth At Bedtime        * famotidine 20 MG tablet    PEPCID    60 tablet    Take 1 tablet (20 mg) by mouth 2 times daily    Heartburn in pregnancy in second trimester       * famotidine 20 MG tablet    PEPCID    60 tablet    Take 1 tablet (20 mg) by mouth 2 times daily    Heart burn       ferrous sulfate 325 (65 Fe) MG tablet    FEROSUL    60 tablet    Take 1 tablet (325 mg) by mouth daily (with breakfast)    Iron deficiency anemia, unspecified iron deficiency anemia type       * nicotine 14 MG/24HR 24 hr patch    NICODERM CQ    42 patch    Place 1 patch onto the skin every 24 hours    Encounter for smoking cessation counseling       * nicotine 7 MG/24HR 24 hr patch    NICODERM CQ    14 patch    Place 1 patch onto the skin every 24 hours    Encounter for smoking cessation counseling       nitroFURantoin macrocrystal-monohydrate 100 MG capsule    MACROBID    20 capsule    Take 1 capsule (100 mg) by mouth 2 times daily    Dysuria       order for DME     1 Device    Equipment being ordered: maternity support belt    Encounter for supervision of other normal pregnancy in third trimester       prenatal multivitamin   with iron 28-0.8 MG Tabs     30 each    Take 1 tablet by mouth daily        vitamin B6 25 MG tablet    pyridOXINE    30 tablet    Take 1 tablet (25 mg) by mouth every 8 hours as needed For nausea        * Notice:  This list has 4 medication(s) that are the same as other medications prescribed for you. Read the directions carefully, and ask your doctor or other care provider to review them with you.

## 2018-11-29 NOTE — PROGRESS NOTES
Wet prep + for clue cells at prenatal visit today. Will treat with Flagyl 500mg BID x7d. Paperwork filled out and RN to call patient to notify that she can pick it up and also she should  and start taking Flagyl for BV. Ruma Orantes CNM

## 2018-11-29 NOTE — PROGRESS NOTES
Pt here with  and daughter. She is c/o increased contractions, lower abdominal cramping and pubic bone pain. She was laid off from her job due to her inability to complete the task. 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 Pt reports increased walking results in increased cramping and contractions. She was using a maternity support belt without relief. She is asking that we fill out paperwork for county assistance. In addition, she describes increased vaginal discharge that has a greenish tint. Wet prep collected, cervical exam revealed closed posterior cervix. Pt will leave urine sample for UA to rule out UTI. She has a f/u US today at [facility] for low lying placenta and is planning to meet with MD next week for TOLAC consult. Baby very active. No painful contractions currently, no LOF or bleeding. Will call patient with lab results. MML

## 2018-12-06 ENCOUNTER — PRENATAL OFFICE VISIT (OUTPATIENT)
Dept: OBGYN | Facility: CLINIC | Age: 33
End: 2018-12-06
Payer: COMMERCIAL

## 2018-12-06 VITALS
BODY MASS INDEX: 27.25 KG/M2 | SYSTOLIC BLOOD PRESSURE: 118 MMHG | DIASTOLIC BLOOD PRESSURE: 68 MMHG | HEART RATE: 92 BPM | TEMPERATURE: 97.2 F | WEIGHT: 149 LBS

## 2018-12-06 DIAGNOSIS — O34.219 PREVIOUS CESAREAN DELIVERY, ANTEPARTUM CONDITION OR COMPLICATION: ICD-10-CM

## 2018-12-06 DIAGNOSIS — O09.43 SUPERVISION OF PREGNANCY WITH GRAND MULTIPARITY IN THIRD TRIMESTER: Primary | ICD-10-CM

## 2018-12-06 DIAGNOSIS — R45.89 DEPRESSED MOOD: ICD-10-CM

## 2018-12-06 DIAGNOSIS — O44.40 LOW-LYING PLACENTA: ICD-10-CM

## 2018-12-06 DIAGNOSIS — O34.219 DESIRES VBAC (VAGINAL BIRTH AFTER CESAREAN) TRIAL: ICD-10-CM

## 2018-12-06 PROCEDURE — 99207 ZZC PRENATAL VISIT: CPT | Performed by: OBSTETRICS & GYNECOLOGY

## 2018-12-06 ASSESSMENT — ANXIETY QUESTIONNAIRES
IF YOU CHECKED OFF ANY PROBLEMS ON THIS QUESTIONNAIRE, HOW DIFFICULT HAVE THESE PROBLEMS MADE IT FOR YOU TO DO YOUR WORK, TAKE CARE OF THINGS AT HOME, OR GET ALONG WITH OTHER PEOPLE: SOMEWHAT DIFFICULT
6. BECOMING EASILY ANNOYED OR IRRITABLE: NEARLY EVERY DAY
7. FEELING AFRAID AS IF SOMETHING AWFUL MIGHT HAPPEN: NEARLY EVERY DAY
3. WORRYING TOO MUCH ABOUT DIFFERENT THINGS: NEARLY EVERY DAY
1. FEELING NERVOUS, ANXIOUS, OR ON EDGE: NEARLY EVERY DAY
2. NOT BEING ABLE TO STOP OR CONTROL WORRYING: MORE THAN HALF THE DAYS
GAD7 TOTAL SCORE: 19
5. BEING SO RESTLESS THAT IT IS HARD TO SIT STILL: MORE THAN HALF THE DAYS

## 2018-12-06 ASSESSMENT — PATIENT HEALTH QUESTIONNAIRE - PHQ9
SUM OF ALL RESPONSES TO PHQ QUESTIONS 1-9: 14
5. POOR APPETITE OR OVEREATING: NEARLY EVERY DAY

## 2018-12-06 NOTE — MR AVS SNAPSHOT
After Visit Summary   2018    Akua Gr    MRN: [id number]           Patient Information     Date Of Birth          1985        Visit Information        Provider Department      2018 10:15 AM Lolis Pickett MD [facility]        Today's Diagnoses     Supervision of pregnancy with grand multiparity in third trimester    -  1    Depressed mood        Desires  (vaginal birth after ) trial        Low-lying placenta        Previous  delivery, antepartum condition or complication           Follow-ups after your visit        Additional Services     MENTAL HEALTH REFERRAL  - Adult; Outpatient Treatment; Individual/Couples/Family/Group Therapy/Health Psychology; FMG: [facility] [phone]; We will contact you to schedule the appointment or please call with any questions       All scheduling is subject to the client's specific insurance plan & benefits, provider/location availability, and provider clinical specialities.  Please arrive 15 minutes early for your first appointment and bring your completed paperwork.    Please be aware that coverage of these services is subject to the terms and limitations of your health insurance plan.  Call member services at your health plan with any benefit or coverage questions.                            Follow-up notes from your care team     Return in about 2 weeks (around 2018).      Your next 10 appointments already scheduled     Dec 27, 2018 10:15 AM SHAD ARTHUR US COMPRE SINGLE F/U with [id number]   [facility])    [address]  [facility] [address] [phone]           Wear comfortable clothes and leave your valuables at home.            Dec 27, 2018 10:45 AM SHAD   Radiology MD with UR JERSON LINTON   [facility]  [place])    [address]  [facility] [address]   [phone]           Please arrive at the time given for your first appointment. This visit is used internally to schedule the physician's time during your ultrasound.              Who to contact     If you have questions or need follow up information about today's clinic visit or your schedule please contact [facility] directly at [phone].  Normal or non-critical lab and imaging results will be communicated to you by MyChart, letter or phone within 4 business days after the clinic has received the results. If you do not hear from us within 7 days, please contact the clinic through MyChart or phone. If you have a critical or abnormal lab result, we will notify you by phone as soon as possible.  Submit refill requests through Ecoviate or call your pharmacy and they will forward the refill request to us. Please allow 3 business days for your refill to be completed.          Additional Information About Your Visit        Care EveryWhere ID     This is your Care EveryWhere ID. This could be used by other organizations to access your [place] medical records  [id number]        Your Vitals Were     Pulse Temperature Last Period BMI (Body Mass Index)          92 97.2  F (36.2  C) (Oral) 04/13/2018 (Exact Date) 27.25 kg/m2         Blood Pressure from Last 3 Encounters:   12/06/18 118/68   11/29/18 115/67   10/31/18 128/69    Weight from Last 3 Encounters:   12/06/18 149 lb (67.6 kg)   11/29/18 147 lb (66.7 kg)   10/31/18 147 lb (66.7 kg)              We Performed the Following     MENTAL HEALTH REFERRAL  - Adult; Outpatient Treatment; Individual/Couples/Family/Group Therapy/Health Psychology; FMG: [facility] [phone]; We will contact you to schedule the appointment or please call with any questions        Primary Care Provider Office Phone # Fax #    [facility] [phone]       [address]  [address] [address]        Equal Access to Services     CASSANDRA MCNEAL : Hadii dana hester hadalexsandersonja Elzaali, waaxda luqadaha, qaybta kazelalemmelanie houstonbestmelanie, av gonzalesgeovanyriley riggins. So [facility] [phone].    ATENCIÓN: Si habla español, tiene a lopez disposición servicios gratuitos de asistencia lingüística. Llame al [phone].    We comply with applicable federal civil rights laws and [place] laws. We do not discriminate on the basis of race, color, national origin, age, disability, sex, sexual orientation, or gender identity.            Thank you!     Thank you for choosing [facility]  for your care. Our goal is always to provide you with excellent care. Hearing back from our patients is one way we can continue to improve our services. Please take a few minutes to complete the written survey that you may receive in the mail after your visit with us. Thank you!             Your Updated Medication List - Protect others around you: Learn how to safely use, store and throw away your medicines at www.disposemymeds.org.          This list is accurate as of 12/6/18 10:48 AM.  Always use your most recent med list.                   Brand Name Dispense Instructions for use Diagnosis    albuterol 108 (90 Base) MCG/ACT inhaler    PROAIR HFA/PROVENTIL HFA/VENTOLIN HFA    1 Inhaler    Inhale 2 puffs into the lungs every 6 hours as needed for shortness of breath / dyspnea or wheezing        doxylamine 25 MG Tabs tablet    UNISOM    30 each    Take 1 tablet (25 mg) by mouth every 8 hours as needed For nausea        Doxylamine-Pyridoxine 10-10 MG Tbec     30 tablet    Take 10 mg by mouth At Bedtime        * famotidine 20 MG tablet    PEPCID    60 tablet    Take 1 tablet (20 mg) by mouth 2 times daily    Heartburn in pregnancy in second trimester       * famotidine 20 MG tablet    PEPCID    60 tablet    Take 1 tablet (20 mg) by mouth 2 times daily    Heart burn       ferrous sulfate 325  (65 Fe) MG tablet    FEROSUL    60 tablet    Take 1 tablet (325 mg) by mouth daily (with breakfast)    Iron deficiency anemia, unspecified iron deficiency anemia type       * nicotine 14 MG/24HR 24 hr patch    NICODERM CQ    42 patch    Place 1 patch onto the skin every 24 hours    Encounter for smoking cessation counseling       * nicotine 7 MG/24HR 24 hr patch    NICODERM CQ    14 patch    Place 1 patch onto the skin every 24 hours    Encounter for smoking cessation counseling       nitroFURantoin macrocrystal-monohydrate 100 MG capsule    MACROBID    20 capsule    Take 1 capsule (100 mg) by mouth 2 times daily    Dysuria       order for DME     1 Device    Equipment being ordered: maternity support belt    Encounter for supervision of other normal pregnancy in third trimester       prenatal multivitamin  with iron 28-0.8 MG Tabs     30 each    Take 1 tablet by mouth daily        vitamin B6 25 MG tablet    pyridOXINE    30 tablet    Take 1 tablet (25 mg) by mouth every 8 hours as needed For nausea        * Notice:  This list has 4 medication(s) that are the same as other medications prescribed for you. Read the directions carefully, and ask your doctor or other care provider to review them with you.

## 2018-12-06 NOTE — PROGRESS NOTES
OB Consult for TOLAC  2018     Akua Gr is a 33 year old  at 33w6d who presents to assess mode of delivery.   She has had one previous low transverse  section and is interested in trial of labor after .     Her current pregnancy is complicated by low lying placenta (last US 1.8cm from os). Pregnancy is otherwise progressing well.  Please refer to the antepartum records for further details. She is here today with her partner and .    Active fetal movement. No contractions. No leaking fluid, bleeding, abnormal discharge.        AUGUSTO-7 SCORE 2018   Total Score 19       PHQ-9 SCORE 10/10/2018 2018   PHQ-9 Total Score 6 14     Denies thoughts of harming self or others.     Obstetric History       T4      L5     SAB0   TAB0   Ectopic0   Multiple1   Live Births6       # Outcome Date GA Lbr Kip/2nd Weight Sex Delivery Anes PTL Lv   9 Current            8 AB 13           7A  13 26w3d  1 lb 11.5 oz (0.78 kg) F -SEC  Y RIGOBERTO   7B  13 26w3d  1 lb 11.5 oz (0.78 kg) M -SEC  Y ND   6 Term 12 40w0d  6 lb 13 oz (3.09 kg) F   N RIGOBERTO      Complications: IUGR (intrauterine growth restriction) affecting care of mother   5 Term 05 42w0d  6 lb 12 oz (3.062 kg) F   N RIGOBERTO   4 Term 01 40w0d  6 lb 3 oz (2.807 kg) M   N RIGOBERTO   3 Term 00 38w0d  5 lb 4 oz (2.381 kg) M   N RIGOBERTO   2 AB            1 AB      TAB             TOLAC Candidate Assessment  Prior c/s date 13     Indication:  labor, abnormal fetal tracing, twins breech/transverse  Labor Course: Pt was admitted with PPROM at 25+3wks, with breech presentation of twin A, she had completed BMZ, latency antibiotics and magnesium for neuroprophylaxis, when she started to labor. She was examined and found to be completely dilated, she was also noted to have an elevated WBC count. She was counseled to proceed with urgent , which she  agreed to.   Operative note is available and has been reviewed.    Incision: low transverse  Uterine closure:  double layer  The procedure and the post operative course were uncomplicated.       Future Pregnancy Plans: unsure   calculator: 78%  [email or web address]    ASSESSMENT:   Akua Gr is a 33 year old  at 33w6d with history of previous low transverse  section x1. She is a good candidate for TOLAC as long as her placenta moves to be 2cm or greater from her cervical os. She has a repeat US scheduled .   Also having mood disorder symptoms concerning for anxiety and depression.    PLAN:    The options of trial of labor after  (TOLAC) and repeat  section (RCS) were discussed in detail with her.  We reviewed the risks and benefits of both.  We discussed the risks of repeat  including infection, excessive blood loss, injury to other organs, potential need for hysterectomy, risks of anesthesia. We discussed the risks of vaginal birth after  (), and in particular discussed the risk of uterine rupture and incidence of 0.5 to 1%.  We discussed the fact that this can be a catastrophic event for both the baby and the mother, requiring an immediate emergency RCS probably under general anesthesia, with associated increased operative risks.  We discussed the possible fetal damage or death, and the possible grave maternal risks, which can include hysterectomy, excessive blood loss or death.  We discussed the fact that we have in-house anesthesia, NICU, and OB personnel who can respond to these emergencies immediatedly at [place], but also discussed the fact that even under these conditions a good outcome for mother and baby cannot be guaranteed. We discussed the need for labs, IV access and continuous fetal monitoring during her labor and delivery.  Her questions were answered in detail.     She wishes to  proceed with a trial of labor and plans TOLAC if her placenta is no longer low lying. Written consent obtained. She is well-informed regarding her choices.  RTC 2wks with CNM for routine obstetrical care.     Referral to outpatient mental health. Discussed SSRIs, patient not interested at this time. Contracted for safety.    Lolis Pickett MD

## 2018-12-07 ASSESSMENT — ANXIETY QUESTIONNAIRES: GAD7 TOTAL SCORE: 19

## 2018-12-08 ENCOUNTER — TELEPHONE (OUTPATIENT)
Dept: OBGYN | Facility: CLINIC | Age: 33
End: 2018-12-08

## 2018-12-08 ENCOUNTER — NURSE TRIAGE (OUTPATIENT)
Dept: NURSING | Facility: CLINIC | Age: 33
End: 2018-12-08

## 2018-12-08 NOTE — TELEPHONE ENCOUNTER
Called by patient - no answer, mailbox full.  [facility] L&D is closed.  I wanted to discuss symptoms with patient and see if she needs to be see for evalutation.  As we do not do TOLACs at [facility] or [facility], she might have to go to another hospital like [facility] or [facility].

## 2018-12-08 NOTE — TELEPHONE ENCOUNTER
"Patient is complaining of central low back pain that started some last night, but is constant today. Says her lower abdomen is painful also. And says when she moves from laying down to standing, she will have a contraction. She is 34 weeks pregnant and sees the midwives through [facility]. As of 10 am this morning we were informed that [facility] is at capacity. Will page Midwife per FNA guidelines.   12:48 pm Via Smart web I paged Mercy Orozco, midwife on call for [facility], to call the patient directly at [phone].     Carola Villar RN, [facility]    Reason for Disposition    MODERATE-SEVERE abdominal pain (e.g., interferes with normal activities, awakens from sleep)    Additional Information    Negative: Passed out (i.e., lost consciousness, collapsed and was not responding)    Negative: Shock suspected (e.g., cold/pale/clammy skin, too weak to stand, low BP, rapid pulse)    Negative: Difficult to awaken or acting confused  (e.g., disoriented, slurred speech)    Negative: [1] SEVERE abdominal pain (e.g., excruciating) AND [2] constant AND [3] present > 1 hour    Negative: SEVERE vaginal bleeding (e.g., continuous red blood from vagina, or large blood clots)    Negative: Sounds like a life-threatening emergency to the triager    Negative: Followed an abdomen (stomach) injury    Negative: [1] Having contractions or other symptoms of labor AND [2] >= 37 weeks pregnant (i.e., term pregnancy)    Negative: [1] Having contractions or other symptoms of labor AND [2] < 37 weeks pregnant (i.e., )    Negative: [1] Abdominal pain AND [2] pregnant < 20 weeks    Negative: [1] Vomiting AND [2] contains red blood or black (\"coffee ground\") material  (Exception: few red streaks in vomit that only happened once)    Protocols used: PREGNANCY - ABDOMINAL PAIN GREATER THAN 20 WEEKS EGA-ADULT-AH    "

## 2018-12-17 ENCOUNTER — PRENATAL OFFICE VISIT (OUTPATIENT)
Dept: MIDWIFE SERVICES | Facility: CLINIC | Age: 33
End: 2018-12-17
Payer: COMMERCIAL

## 2018-12-17 VITALS
WEIGHT: 151.1 LBS | SYSTOLIC BLOOD PRESSURE: 113 MMHG | HEART RATE: 75 BPM | DIASTOLIC BLOOD PRESSURE: 74 MMHG | TEMPERATURE: 97 F | BODY MASS INDEX: 27.64 KG/M2

## 2018-12-17 DIAGNOSIS — O34.219 PATIENT DESIRES VAGINAL BIRTH AFTER CESAREAN SECTION (VBAC): Primary | ICD-10-CM

## 2018-12-17 DIAGNOSIS — O44.40 LOW-LYING PLACENTA: ICD-10-CM

## 2018-12-17 LAB — HGB BLD-MCNC: 9 G/DL (ref 11.7–15.7)

## 2018-12-17 PROCEDURE — 99207 ZZC PRENATAL VISIT: CPT | Performed by: ADVANCED PRACTICE MIDWIFE

## 2018-12-17 PROCEDURE — 00000218 ZZHCL STATISTIC OBHBG - HEMOGLOBIN: Performed by: ADVANCED PRACTICE MIDWIFE

## 2018-12-17 PROCEDURE — 87653 STREP B DNA AMP PROBE: CPT | Performed by: ADVANCED PRACTICE MIDWIFE

## 2018-12-17 PROCEDURE — 36415 COLL VENOUS BLD VENIPUNCTURE: CPT | Performed by: ADVANCED PRACTICE MIDWIFE

## 2018-12-17 NOTE — PROGRESS NOTES
35w3d  Pt here for lots of contractions in last 3-5 days, report nausea but has heartburn, enc. Twice per day heartburn medications as that may help nausea.  Pt has been seeing PT for symphysis pubis pain, feels like it is getting better but report feeling like baby moving down.   SVE; closed/long/ firmish/-3 very posterior.  Likely OP position causing pain and contractions, discussion at length about pelvic tilts, leaning forward to enc. Fetus to move away from OP position this may help pain, and contractions.  GBS and hgb today.   Pt plans  seen MD last visit.   Will rtc weekly mrb

## 2018-12-18 LAB
GP B STREP DNA SPEC QL NAA+PROBE: NEGATIVE
SPECIMEN SOURCE: NORMAL

## 2018-12-19 ENCOUNTER — TELEPHONE (OUTPATIENT)
Dept: MIDWIFE SERVICES | Facility: CLINIC | Age: 33
End: 2018-12-19

## 2018-12-19 NOTE — TELEPHONE ENCOUNTER
T.C to patient informing her of hgb of 9.0  Pt states she does have iron at home and has been taking it daily. Enc. To take 2 times per day.  Discussed iron rich foods and enc. To eat daily.    rtc in 1 week patient informed of negative GBS today too.  Sarah Alves CNM

## 2018-12-27 ENCOUNTER — OFFICE VISIT (OUTPATIENT)
Dept: MATERNAL FETAL MEDICINE | Facility: CLINIC | Age: 33
End: 2018-12-27
Attending: OBSTETRICS & GYNECOLOGY
Payer: COMMERCIAL

## 2018-12-27 ENCOUNTER — PRENATAL OFFICE VISIT (OUTPATIENT)
Dept: MIDWIFE SERVICES | Facility: CLINIC | Age: 33
End: 2018-12-27
Payer: COMMERCIAL

## 2018-12-27 ENCOUNTER — HOSPITAL ENCOUNTER (OUTPATIENT)
Dept: ULTRASOUND IMAGING | Facility: CLINIC | Age: 33
Discharge: HOME | End: 2018-12-27
Attending: OBSTETRICS & GYNECOLOGY | Admitting: OBSTETRICS & GYNECOLOGY
Payer: COMMERCIAL

## 2018-12-27 VITALS
HEIGHT: 62 IN | WEIGHT: 152 LBS | HEART RATE: 72 BPM | SYSTOLIC BLOOD PRESSURE: 119 MMHG | TEMPERATURE: 98.7 F | BODY MASS INDEX: 27.97 KG/M2 | DIASTOLIC BLOOD PRESSURE: 79 MMHG

## 2018-12-27 DIAGNOSIS — O44.40 ULTRASOUND SCAN TO RECHECK LOW LYING PLACENTA, ANTEPARTUM: Primary | ICD-10-CM

## 2018-12-27 DIAGNOSIS — O34.219 HISTORY OF CESAREAN DELIVERY AFFECTING PREGNANCY: Primary | ICD-10-CM

## 2018-12-27 DIAGNOSIS — O42.90 AMNIOTIC FLUID LEAKING: ICD-10-CM

## 2018-12-27 DIAGNOSIS — O44.40 LOW-LYING PLACENTA: ICD-10-CM

## 2018-12-27 LAB — RUPTURE OF FETAL MEMBRANES BY ROM PLUS: NEGATIVE

## 2018-12-27 PROCEDURE — 76817 TRANSVAGINAL US OBSTETRIC: CPT | Performed by: OBSTETRICS & GYNECOLOGY

## 2018-12-27 PROCEDURE — 84112 EVAL AMNIOTIC FLUID PROTEIN: CPT | Performed by: ADVANCED PRACTICE MIDWIFE

## 2018-12-27 PROCEDURE — 99207 ZZC PRENATAL VISIT: CPT | Performed by: ADVANCED PRACTICE MIDWIFE

## 2018-12-27 PROCEDURE — 59426 ANTEPARTUM CARE ONLY: CPT | Performed by: ADVANCED PRACTICE MIDWIFE

## 2018-12-27 PROCEDURE — 76816 OB US FOLLOW-UP PER FETUS: CPT

## 2018-12-27 ASSESSMENT — MIFFLIN-ST. JEOR: SCORE: 1342.72

## 2018-12-27 NOTE — LETTER
January 3, 2019      Akua Gr  [address] [address]        Dear MsPrinceSimón,    We are writing to inform you of your test results.    Your test results fall within the expected range(s) or remain unchanged from previous results.  Please continue with current treatment plan.    Resulted Orders   Rupture of Fetal Membranes by ROM Plus   Result Value Ref Range    Rupture of Fetal Membranes by ROM Plus Negative NEG^Negative       If you have any questions or concerns, please call the clinic at the number listed above.       Sincerely,        CHICHI Cueva CNM

## 2018-12-27 NOTE — PROGRESS NOTES
Pt coming from [facility], reports that placenta is now >3cm away from cervical os so cleared for TOLAC. She reports she has been having some leaking fluid over the last few days. No contractions or bleeding. Baby is active. EFW today was 5lb 14oz, 22% for growth. Reviewed labs from last visit. Pt requesting cervical exam - closed but very posterior and difficult to assess. Asking about stripping membranes. I explained that there isn't much benefit before 39 weeks but if patient feels strongly we can do before. ROM+ sent for c/o LOF. Will notify patient of results once I get them. RTC weekly. MML

## 2019-01-01 NOTE — DISCHARGE INSTRUCTIONS
Warm salt water garggles with 1 teaspoon of salt in an 8 ounce glass of water (garggle in spit it out).    Chloriseptic throat spray as needed for pain.    Take Advil/Ibuprofen 400 mg every 6 hours as needed for pain with food.    Opioid Medication Information    You have been given a prescription for an opioid (narcotic) pain medicine and/or have received a pain medicine while here in the Emergency Department. These medicines can make you drowsy or impaired. You must not drive, operate dangerous equipment, or engage in any other dangerous activities while taking these medications. If you drive while taking these medications, you could be arrested for DUI, or driving under the influence. Do not drink any alcohol while you are taking these medications.   Opioid pain medications can cause addiction. If you have a history of chemical dependency of any type, you are at a higher risk of becoming addicted to pain medications.  Only take these prescribed medications to treat your pain when all other options have been tried. Take it for as short a time and as few doses as possible. Store your pain pills in a secure place, as they are frequently stolen and provide a dangerous opportunity for children or visitors in your house to start abusing these powerful medications. We will not replace any lost or stolen medicine.  As soon as your pain is better, you should flush all your remaining medication.   Many prescription pain medications contain Tylenol  (acetaminophen), including Vicodin , Tylenol #3 , Norco , Lortab , and Percocet .  You should not take any extra pills of Tylenol  if you are using these prescription medications or you can get very sick.  Do not ever take more than 4000 mg of acetaminophen in any 24 hour period.  All opioids tend to cause constipation. Drink plenty of water and eat foods that have a lot of fiber, such as fruits, vegetables, prune juice, apple juice and high fiber cereal.  Take a laxative if you  don t move your bowels at least every other day. Miralax , Milk of Magnesia, Colace , or Senna  can be used to keep you regular.           hypertension

## 2019-01-03 ENCOUNTER — PRENATAL OFFICE VISIT (OUTPATIENT)
Dept: MIDWIFE SERVICES | Facility: CLINIC | Age: 34
End: 2019-01-03

## 2019-01-03 ENCOUNTER — HOSPITAL ENCOUNTER (OUTPATIENT)
Facility: CLINIC | Age: 34
Discharge: HOME | End: 2019-01-03
Attending: ADVANCED PRACTICE MIDWIFE | Admitting: ADVANCED PRACTICE MIDWIFE

## 2019-01-03 VITALS
HEIGHT: 62 IN | SYSTOLIC BLOOD PRESSURE: 127 MMHG | WEIGHT: 151 LBS | HEART RATE: 125 BPM | DIASTOLIC BLOOD PRESSURE: 87 MMHG | BODY MASS INDEX: 27.79 KG/M2 | OXYGEN SATURATION: 95 %

## 2019-01-03 VITALS
HEART RATE: 97 BPM | SYSTOLIC BLOOD PRESSURE: 115 MMHG | RESPIRATION RATE: 18 BRPM | TEMPERATURE: 97.4 F | DIASTOLIC BLOOD PRESSURE: 64 MMHG

## 2019-01-03 DIAGNOSIS — O44.40 LOW-LYING PLACENTA: ICD-10-CM

## 2019-01-03 PROBLEM — Z36.89 ENCOUNTER FOR TRIAGE IN PREGNANT PATIENT: Status: ACTIVE | Noted: 2019-01-03

## 2019-01-03 LAB — RUPTURE OF FETAL MEMBRANES BY ROM PLUS: NEGATIVE

## 2019-01-03 PROCEDURE — G0463 HOSPITAL OUTPT CLINIC VISIT: HCPCS

## 2019-01-03 PROCEDURE — 59025 FETAL NON-STRESS TEST: CPT | Mod: 26 | Performed by: ADVANCED PRACTICE MIDWIFE

## 2019-01-03 PROCEDURE — 84112 EVAL AMNIOTIC FLUID PROTEIN: CPT | Performed by: ADVANCED PRACTICE MIDWIFE

## 2019-01-03 PROCEDURE — 99212 OFFICE O/P EST SF 10 MIN: CPT | Mod: 25 | Performed by: ADVANCED PRACTICE MIDWIFE

## 2019-01-03 ASSESSMENT — MIFFLIN-ST. JEOR: SCORE: 1338.18

## 2019-01-03 NOTE — DISCHARGE INSTRUCTIONS
Discharge Instruction for Undelivered Patients      You were seen for: Membrane Assessment  We Consulted: Joaquin Lowery  You had (Test or Medicine): ROM plus     Diet:   Drink 8 to 12 glasses of liquids (milk, juice, water) every day.  You may eat meals and snacks.     Activity:  Call your doctor or nurse midwife if your baby is moving less than usual.     Call your provider if you notice:    Signs of bladder infection: pain when you urinate (use the toilet), need to go more often and more urgently.  The bag of clark (rupture of membranes) breaks, or you notice leaking in your underwear.  Bright red blood in your underwear.  Abdominal (lower belly) or stomach pain.  Second (plus) baby: Contractions (tightening) less than 10 minutes apart and getting stronger.        Follow-up:  As scheduled in the clinic, or if continued leaking, call the clinic.

## 2019-01-03 NOTE — PROGRESS NOTES
37w6d  Feeling well. Ready for baby. Wants membranes swept but unable r/t cervical dilation and posterior cervix. Reports good fetal movement. Denies leaking of fluid, vaginal bleeding, regular uterine contractions, headache or other concerns.  Discussed hgb of 9.0. Will try to take daily iron. RTC in 1wk HKJ

## 2019-01-03 NOTE — PLAN OF CARE
Data: Patient presented to the [facility] at 1340.   Reason for maternal/fetal assessment per patient is Rule out rupture of membranes  . Patient is a . Prenatal record reviewed.      Obstetric History       T4      L5     SAB0   TAB0   Ectopic0   Multiple1   Live Births6       # Outcome Date GA Lbr Kip/2nd Weight Sex Delivery Anes PTL Lv   9 Current            8 AB 13           7A  13 26w3d  0.78 kg (1 lb 11.5 oz) F -SEC  Y RIGOBERTO   7B  13 26w3d  0.78 kg (1 lb 11.5 oz) M -SEC  Y ND   6 Term 12 40w0d  3.09 kg (6 lb 13 oz) F   N RIGOBERTO      Complications: IUGR (intrauterine growth restriction) affecting care of mother   5 Term 05 42w0d  3.062 kg (6 lb 12 oz) F   N RIGOBERTO   4 Term 01 40w0d  2.807 kg (6 lb 3 oz) M   N RIGOBERTO   3 Term 00 38w0d  2.381 kg (5 lb 4 oz) M   N RIGOBERTO   2 AB            1 AB      TAB            Medical History:   Past Medical History:   Diagnosis Date     Anemia    . Gestational Age 37w6d. VSS. Patient reports fluid noticed when she bends and coughs. None seen on the chux pad after coughing, but when patient standing she can recreate, approximately 5 cm fluid on a paper towel.   Action: Verbal consent for EFM. Triage assessment completed. EFM applied for rule out rupture. Fetal assessment: Presumed adequate fetal oxygenation documented (see flow record). Patient instructed to report change in fetal movement, or bleeding, abdominal pain, or any concerns related to the pregnancy to her nurse/physician.   Response: Joaquin Horvath informed of patient arrival. Plan per provider is discharge home after negative Rom Plus, patient to call clinic if continued leaking occurs. Patient verbalized understanding of education and verbalized agreement with plan. Discharged ambulatory at 1520.

## 2019-01-09 ENCOUNTER — PRENATAL OFFICE VISIT (OUTPATIENT)
Dept: MIDWIFE SERVICES | Facility: CLINIC | Age: 34
End: 2019-01-09

## 2019-01-09 VITALS
HEIGHT: 62 IN | SYSTOLIC BLOOD PRESSURE: 126 MMHG | BODY MASS INDEX: 28.71 KG/M2 | DIASTOLIC BLOOD PRESSURE: 89 MMHG | WEIGHT: 156 LBS | HEART RATE: 87 BPM | OXYGEN SATURATION: 98 %

## 2019-01-09 DIAGNOSIS — O44.40 LOW-LYING PLACENTA: ICD-10-CM

## 2019-01-09 DIAGNOSIS — Z34.83 ENCOUNTER FOR SUPERVISION OF OTHER NORMAL PREGNANCY, THIRD TRIMESTER: Primary | ICD-10-CM

## 2019-01-09 PROCEDURE — 99212 OFFICE O/P EST SF 10 MIN: CPT | Performed by: ADVANCED PRACTICE MIDWIFE

## 2019-01-09 ASSESSMENT — MIFFLIN-ST. JEOR: SCORE: 1360.86

## 2019-01-09 NOTE — PROGRESS NOTES
38w5d  Has some  Vomiting now- wakes from sleep and is throwing up. Enc to avoid large meals and sleeping right afterwards. Cervix is more dilated today. Hopes for labor soon. Reports good fetal movement. Denies leaking of fluid, vaginal bleeding, regular uterine contractions, headache or other concerns.  RTC in 1 wk [facility]

## 2019-01-10 NOTE — PROGRESS NOTES
CHIEF COMPLAINT  ========================  Akua Gr is a 33 year old patient presenting for evaluation of uterine contractions.  She is here today by herself.  Pt had a clinic visit and had her membranes stripped.      Patient's last menstrual period was 2018 (exact date).  Estimated Date of Delivery: Estimated Date of Delivery: 2019     HPI  ==================   Complaints of ctx  CONTRACTIONS: mild and irregular  ABDOMINAL PAIN: none  FETAL MOVEMENT: active  VAGINAL BLEEDING: none  RUPTURE OF MEMBRANES: no  PELVIC PAIN: none  OTHER:     REVIEW OF SYSTEMS  =====================  C: NEGATIVE for fever, chills  I: NEGATIVE for worrisome rashes, moles or lesions  E: NEGATIVE for vision changes or irritation  R: NEGATIVE for significant cough or SOB  CV: NEGATIVE for chest pain, palpitations or varicosities  GI: NEGATIVE for nausea, abdominal pain, heartburn, or change in bowel habits  : NEGATIVE for frequency, dysuria, or hematuria  M: NEGATIVE for significant arthralgias or myalgia  N: NEGATIVE for headache, weakness, dizziness or paresthesias  P: NEGATIVE for changes in mood or affect  HISTORIES  ==============  ALLERGIES:    Allergies   Allergen Reactions     Penicillins Hives     PAST MEDICAL HISTORY  Past Medical History:   Diagnosis Date     Anemia      FAMILY HISTORY  Family History   Problem Relation Age of Onset     Hypertension Mother      Hypertension Maternal Grandmother      OB HISTORY  Obstetric History       T4      L5     SAB0   TAB0   Ectopic0   Multiple1   Live Births6       # Outcome Date GA Lbr Kip/2nd Weight Sex Delivery Anes PTL Lv   9 Current            8 AB 13           7A  13 26w3d  0.78 kg (1 lb 11.5 oz) F -SEC  Y RIGOBERTO   7B  13 26w3d  0.78 kg (1 lb 11.5 oz) M -SEC  Y ND   6 Term 12 40w0d  3.09 kg (6 lb 13 oz) F   N RIGOBERTO      Complications: IUGR (intrauterine growth restriction) affecting care of mother    5 Term 05 42w0d  3.062 kg (6 lb 12 oz) F   N RIGOBERTO   4 Term 01 40w0d  2.807 kg (6 lb 3 oz) M   N RIGOBERTO   3 Term 00 38w0d  2.381 kg (5 lb 4 oz) M   N RIGOBERTO   2 AB            1 AB      TAB             EXAM  ============  Vital signs stable, afebrile and BP is   BP Readings from Last 3 Encounters:   19 126/89   19 115/64   19 127/87     GENERAL APPEARANCE: healthy, alert and no distress  RESP: lungs clear to auscultation - no rales, rhonchi or wheezes  BREAST: normal without masses, tenderness or nipple discharge and no palpable axillary masses or adenopathy  CV: regular rates and rhythm, normal S1 S2, no S3 or S4 and no murmur,and no varicosities  ABDOMEN:  soft, nontender,non-tender between contractions no epigastric pain  NEURO: Denies headache, blurred vision  PSYCH: mentation appears normal. and affect normal/bright  LEGS: Reflexes normal bilaterally  CONTRACTIONS:  Irregular pattern  FETAL HEART TONES:  140 baseline FHT's with moderate variability, accelerations-yes, variable or late decels none.  Category I FHR monitor tracing.  NST: REACTIVE  CST: NOT DONE  EFW:6 #, 5 oz.  PELVIC EXAM:  % / -2/Posterior/ soft  PRESENTATION: VERTEX  BLOOD: no  DISCHARGE: clear  STERILE SPEC EXAM- NOT DONE  ROM: No    AMNISURE not done    POOLING: not done  FLUID: none  LABS:  None    DIAGNOSIS  ===========  38w6d,   Problem List Items Addressed This Visit     None          PLAN  ===========  Home with instructions per discharge instruction form

## 2019-01-11 ENCOUNTER — PRENATAL OFFICE VISIT (OUTPATIENT)
Dept: MIDWIFE SERVICES | Facility: CLINIC | Age: 34
End: 2019-01-11

## 2019-01-11 VITALS
DIASTOLIC BLOOD PRESSURE: 77 MMHG | WEIGHT: 157.7 LBS | HEART RATE: 72 BPM | SYSTOLIC BLOOD PRESSURE: 124 MMHG | BODY MASS INDEX: 28.84 KG/M2

## 2019-01-11 DIAGNOSIS — O44.40 LOW-LYING PLACENTA: ICD-10-CM

## 2019-01-11 PROCEDURE — 99212 OFFICE O/P EST SF 10 MIN: CPT | Performed by: ADVANCED PRACTICE MIDWIFE

## 2019-01-11 NOTE — PROGRESS NOTES
Lots of family here.  Feeling well.  Baby is active. Denies any leaking of fluid, vaginal bleeding, regular uterine contractions, or headaches or other concerns.  Cervix checked and membranes swept per her request.  Declines scheduling a repeat .  Plans a .      Reviewed to call [phone] for contractions, loss of fluid, vaginal bleeding, decreased fetal movement or any other questions or concerns.    RTC in 1 weeks.  Mercy Orozco, EDENILSON, APRN, CNM

## 2019-01-12 ENCOUNTER — HOSPITAL ENCOUNTER (OUTPATIENT)
Facility: CLINIC | Age: 34
Discharge: HOME | End: 2019-01-12
Attending: ADVANCED PRACTICE MIDWIFE | Admitting: ADVANCED PRACTICE MIDWIFE

## 2019-01-12 VITALS — TEMPERATURE: 98.4 F | RESPIRATION RATE: 16 BRPM | SYSTOLIC BLOOD PRESSURE: 133 MMHG | DIASTOLIC BLOOD PRESSURE: 84 MMHG

## 2019-01-12 PROCEDURE — G0463 HOSPITAL OUTPT CLINIC VISIT: HCPCS

## 2019-01-12 PROCEDURE — 99213 OFFICE O/P EST LOW 20 MIN: CPT | Mod: 25 | Performed by: ADVANCED PRACTICE MIDWIFE

## 2019-01-12 PROCEDURE — 25000132 ZZH RX MED GY IP 250 OP 250 PS 637: Performed by: ADVANCED PRACTICE MIDWIFE

## 2019-01-12 PROCEDURE — 59025 FETAL NON-STRESS TEST: CPT | Mod: 26 | Performed by: ADVANCED PRACTICE MIDWIFE

## 2019-01-12 PROCEDURE — 40000268 ZZH STATISTIC NO CHARGES

## 2019-01-12 RX ORDER — HYDROXYZINE HYDROCHLORIDE 50 MG/1
100 TABLET, FILM COATED ORAL EVERY 6 HOURS PRN
Status: DISCONTINUED | OUTPATIENT
Start: 2019-01-12 | End: 2019-01-12 | Stop reason: HOSPADM

## 2019-01-12 RX ORDER — ACETAMINOPHEN 325 MG/1
975 TABLET ORAL EVERY 6 HOURS PRN
Status: DISCONTINUED | OUTPATIENT
Start: 2019-01-12 | End: 2019-01-12 | Stop reason: HOSPADM

## 2019-01-12 RX ADMIN — ACETAMINOPHEN 975 MG: 325 TABLET, FILM COATED ORAL at 09:39

## 2019-01-12 RX ADMIN — HYDROXYZINE HYDROCHLORIDE 100 MG: 50 TABLET ORAL at 09:39

## 2019-01-12 NOTE — PROVIDER NOTIFICATION
01/12/19 0655   Provider Notification   Provider Name/Title Lamberto   Method of Notification Electronic Page   Notification Reason Patient Arrived     39.1, 7th baby, last was c/s for twins, sugar q5 with back pain after membranes swept yesterday. Intact, no bleeding.

## 2019-01-12 NOTE — PROGRESS NOTES
Fetal Non-Stress Test Results    NST Ordered By: Maria E Sears CNM        NST Start & Stop Times                                  NST Results  Fetus A   Baseline Rate: 120  Accelerations: Present  Decelerations: None  Interpretation: reactive    Maria E Sears CNM

## 2019-01-12 NOTE — ED NOTES
Pt arrived to ED with complaint of contractions .  Pt reports 39 weeks pregnant.   Pt is having contractions Yes.   Pt feels urge to push No.   Pt reports water broke No.   Report was called and pt was transferred to L&D Yes.

## 2019-01-12 NOTE — PLAN OF CARE
Patient [facility] to [facility] from [facility] at 0626 with complaints of contractions and backache. No LOF or bleeding. + fetal movement. Oriented to triage, monitors applied. Plan to page CNM on call.

## 2019-01-12 NOTE — DISCHARGE INSTRUCTIONS
Discharge Instruction for Undelivered Patients      You were seen for: Labor Assessment  We Consulted: Maria E Sears CNM  You had (Test or Medicine):monitoring, cervical check     Diet:   Drink 8 to 12 glasses of liquids (milk, juice, water) every day.  You may eat meals and snacks.     Activity:  Call your doctor or nurse midwife if your baby is moving less than usual.     Call your provider if you notice:  Swelling in your face or increased swelling in your hands or legs.  Headaches that are not relieved by Tylenol (acetaminophen).  Changes in your vision (blurring: seeing spots or stars.)  Nausea (sick to your stomach) and vomiting (throwing up).   Weight gain of 5 pounds or more per week.  Heartburn that doesn't go away.  Signs of bladder infection: pain when you urinate (use the toilet), need to go more often and more urgently.  The bag of clark (rupture of membranes) breaks, or you notice leaking in your underwear.  Bright red blood in your underwear.  Abdominal (lower belly) or stomach pain.  For first baby: Contractions (tightening) less than 5 minutes apart for one hour or more.  Second (plus) baby: Contractions (tightening) less than 10 minutes apart and getting stronger.  *If less than 34 weeks: Contractions (tightenings) more than 6 times in one hour.  Increase or change in vaginal discharge (note the color and amount)      Follow-up:  As scheduled in the clinic

## 2019-01-12 NOTE — PROVIDER NOTIFICATION
01/12/19 0720   Provider Notification   Provider Name/Title Orion   Method of Notification Phone   Notification Reason Labor Status     Cervix unchanged from clinic. Plan to reassess SVE in 1-1.5 hours when CNM here at hospital. Page if patient would like to discharge now.

## 2019-01-12 NOTE — PLAN OF CARE
D: Patient discharged to home undelivered. Knows when to return to labor and delivery instructions reviewed. P: Discharged to home Undelivered.

## 2019-01-12 NOTE — H&P
Akua Gr is a 33 year old female,  -American,   single      Patient's last menstrual period was 2018 (exact date).. Estimated Date of Delivery: 2019 is calculated from lmp and verified with U/S    Pt [facility] on 2019 for evaluation of uterine contractions    HPI Akua was in the clinic yesterday and had her membranes stripped. After the clinic visit she started having contractions that were irregular. At 0630 they became more regular and she decided the come the birth place. Reports good fetal movement. Denies leaking of fluid, vaginal bleeding, headache or other concerns.       PRENATAL COURSE  Prenatal care began at 16 wks gestation for a total of 11 prenatal visits.  Total wt gain 31  Prenatal vital signs WNL  Prenatal course was   complicated by    Patient Active Problem List    Diagnosis Date Noted     Encounter for triage in pregnant patient 2019     Priority: Medium     Low-lying placenta 10/10/2018     Priority: Medium     Follow by MFM        Previous  delivery, antepartum condition or complication 2018     Priority: Medium     Previous C/S for twins gestation @ 26 weeks.  1 twin, was down syndrome,   at 5 months in heart surgery.    6/3/18 Radiology U/S; 6 6/7 weeks BENNETT 19 Radiology U/s 11 4/7 weeks BENNETT 19 MFM U/s posterior low lying placenta, nl fetal survey except left EIF serial growth U/S for hx of SGA infant  18 MFM U/S posterior low lying placenta, 22 5/7 weeks, SARTHAK 14.1, 27 %tile growth, EFW 1 lb, f/u @ 28-30 weeks (10/12/18)   18 MFM U/S posterior, lateral low lying placenta, 1.2 cm away from os, 29 5/7 weeks, SARTHAK 15.5 cm, 51%tile growth, EFW 3 lbs 3 oz, repeat 4 weeks for low lying  18: MFM US low lying placenta 1.8cm away from os, growth WNL. Report: Follow-up is scheduled in four weeks to reassess fetal growth and placental location. Based on today's ultrasound findings  delivery <39 weeks is not recommended.  18 MFM U/S; low lying placenta has resolved, 36 6/7 weeks, EFW 5 lbs 14 oz.  No f/u needed.          Need for Tdap vaccination 08/15/2018     Priority: Medium     Abnormal Pap smear of cervix 08/15/2018     Priority: Medium     H/O sickle cell trait 08/15/2018     Priority: Medium     Tobacco use disorder 2013     Priority: Medium     Maternal pyelonephritis, current pregnancy 2012     Priority: Medium     Overview:   Prophylaxis until delivery       Vitamin D deficiency 10/21/2011     Priority: Medium     Overview:   Plan supplementation         HISTORIES  Allergies   Allergen Reactions     Penicillins Hives     Past Medical History:   Diagnosis Date     Anemia      Past Surgical History:   Procedure Laterality Date      SECTION       Family History   Problem Relation Age of Onset     Hypertension Mother      Hypertension Maternal Grandmother      Social History     Tobacco Use     Smoking status: Current Every Day Smoker     Packs/day: 0.25     Types: Cigarettes     Smokeless tobacco: Never Used     Tobacco comment: 1-2 cig/day   Substance Use Topics     Alcohol use: No       LABS:       Lab Results   Component Value Date    ABO AB 08/15/2018       Lab Results   Component Value Date    RH Pos 08/15/2018     Rhogam not indicated  Rubella immune   Treponema Pallidum Antibody  Negative    HIV    Non-reactive   Lab Results   Component Value Date    HGB 9.0 2018      Lab Results   Component Value Date    HEPBANG Nonreactive 08/15/2018     Lab Results   Component Value Date    GBS Negative 2018     other     ROS  C: NEGATIVE for fever, chills, change in weight  I: NEGATIVE for worrisome rashes, moles or lesions  E/M: NEGATIVE for ear, mouth and throat problems  R: NEGATIVE for significant cough or SOB  CV: NEGATIVE for chest pain, palpitations or peripheral edema  GI: NEGATIVE for nausea, abdominal pain, heartburn, or change in bowel  habits  : NEGATIVE for frequency, dysuria, or hematuria  PSYCHIATRIC:  NEGATIVE for depression, anxiety or other mental health concerns      PHYSICAL EXAM:  /84   Temp 98.4  F (36.9  C) (Oral)   Resp 16   LMP 04/13/2018 (Exact Date)   General appearance healthy, alert, active and no distress  Neuro:  denies headache and visual disturbances  Psych: Mentation normal and bright   Legs: 2+/2+, no clonus, no edema       Abdomen: gravid, single vertex fetus, non-tender, EFW 7 lbs. Pt is sugar every 4-8 minutes, lasting 45-60 seconds and palpates mild    FETAL HEART TONES: baseline 120 with moderate FHR variability and accelerations.  No decelerations present.     MEMBRANES: Membranes are intact    PELVIC EXAM: 2/ 50%/ -3  BLOODY SHOW:: no    ASSESSMENT:  IUP @ 39 wks gestation in in latent labor  NST  reactive          PLAN:  Home with labor instructions per discharge instruction form and Medications given tylenol and vistaril for sleep. Discussed that membrane sweeping can cause lots of contractions that do not lead to labor. Better to wait until spontaneous labor. RTC for regular prenatal  Care or to the birthplace for increasing symptoms of labor.     Maria E Sears, BRAD

## 2019-01-14 ENCOUNTER — NURSE TRIAGE (OUTPATIENT)
Dept: NURSING | Facility: CLINIC | Age: 34
End: 2019-01-14

## 2019-01-15 ENCOUNTER — ANESTHESIA (OUTPATIENT)
Dept: OBGYN | Facility: CLINIC | Age: 34
End: 2019-01-15

## 2019-01-15 ENCOUNTER — ANESTHESIA EVENT (OUTPATIENT)
Dept: OBGYN | Facility: CLINIC | Age: 34
End: 2019-01-15

## 2019-01-15 ENCOUNTER — HOSPITAL ENCOUNTER (INPATIENT)
Facility: CLINIC | Age: 34
LOS: 3 days | Discharge: HOME | End: 2019-01-18
Attending: ADVANCED PRACTICE MIDWIFE | Admitting: ADVANCED PRACTICE MIDWIFE

## 2019-01-15 DIAGNOSIS — O34.219 VBAC, DELIVERED: Primary | ICD-10-CM

## 2019-01-15 LAB
ABO + RH BLD: NORMAL
ABO + RH BLD: NORMAL
APTT PPP: 30 SEC (ref 22–37)
BLD GP AB SCN SERPL QL: NORMAL
BLOOD BANK CMNT PATIENT-IMP: NORMAL
ERYTHROCYTE [DISTWIDTH] IN BLOOD BY AUTOMATED COUNT: 13.9 % (ref 10–15)
FIBRINOGEN PPP-MCNC: 463 MG/DL (ref 200–420)
HCT VFR BLD AUTO: 27.4 % (ref 35–47)
HGB BLD-MCNC: 9.2 G/DL (ref 11.7–15.7)
HGB F BLD QL KLEIH BETKE: NORMAL
INR PPP: 1 (ref 0.86–1.14)
MCH RBC QN AUTO: 31.2 PG (ref 26.5–33)
MCHC RBC AUTO-ENTMCNC: 33.6 G/DL (ref 31.5–36.5)
MCV RBC AUTO: 93 FL (ref 78–100)
PLATELET # BLD AUTO: 276 10E9/L (ref 150–450)
RBC # BLD AUTO: 2.95 10E12/L (ref 3.8–5.2)
RUPTURE OF FETAL MEMBRANES BY ROM PLUS: POSITIVE
SPECIMEN EXP DATE BLD: NORMAL
T PALLIDUM AB SER QL: NONREACTIVE
WBC # BLD AUTO: 12.6 10E9/L (ref 4–11)

## 2019-01-15 PROCEDURE — 84112 EVAL AMNIOTIC FLUID PROTEIN: CPT | Performed by: ADVANCED PRACTICE MIDWIFE

## 2019-01-15 PROCEDURE — 86901 BLOOD TYPING SEROLOGIC RH(D): CPT | Performed by: ADVANCED PRACTICE MIDWIFE

## 2019-01-15 PROCEDURE — 86850 RBC ANTIBODY SCREEN: CPT | Performed by: ADVANCED PRACTICE MIDWIFE

## 2019-01-15 PROCEDURE — 85027 COMPLETE CBC AUTOMATED: CPT | Performed by: ADVANCED PRACTICE MIDWIFE

## 2019-01-15 PROCEDURE — 40000671 ZZH STATISTIC ANESTHESIA CASE

## 2019-01-15 PROCEDURE — 25000128 H RX IP 250 OP 636: Performed by: ADVANCED PRACTICE MIDWIFE

## 2019-01-15 PROCEDURE — 25000125 ZZHC RX 250: Performed by: ADVANCED PRACTICE MIDWIFE

## 2019-01-15 PROCEDURE — 86900 BLOOD TYPING SEROLOGIC ABO: CPT | Performed by: ADVANCED PRACTICE MIDWIFE

## 2019-01-15 PROCEDURE — 12000001 ZZH R&B MED SURG/OB UMMC

## 2019-01-15 PROCEDURE — 36415 COLL VENOUS BLD VENIPUNCTURE: CPT | Performed by: ADVANCED PRACTICE MIDWIFE

## 2019-01-15 PROCEDURE — 85610 PROTHROMBIN TIME: CPT | Performed by: ADVANCED PRACTICE MIDWIFE

## 2019-01-15 PROCEDURE — 40000268 ZZH STATISTIC NO CHARGES

## 2019-01-15 PROCEDURE — 86780 TREPONEMA PALLIDUM: CPT | Performed by: ADVANCED PRACTICE MIDWIFE

## 2019-01-15 PROCEDURE — G0463 HOSPITAL OUTPT CLINIC VISIT: HCPCS

## 2019-01-15 PROCEDURE — 10907ZC DRAINAGE OF AMNIOTIC FLUID, THERAPEUTIC FROM PRODUCTS OF CONCEPTION, VIA NATURAL OR ARTIFICIAL OPENING: ICD-10-PCS | Performed by: ADVANCED PRACTICE MIDWIFE

## 2019-01-15 PROCEDURE — 85460 HEMOGLOBIN FETAL: CPT | Performed by: ADVANCED PRACTICE MIDWIFE

## 2019-01-15 PROCEDURE — 25000128 H RX IP 250 OP 636: Performed by: STUDENT IN AN ORGANIZED HEALTH CARE EDUCATION/TRAINING PROGRAM

## 2019-01-15 PROCEDURE — 00HU33Z INSERTION OF INFUSION DEVICE INTO SPINAL CANAL, PERCUTANEOUS APPROACH: ICD-10-PCS | Performed by: ANESTHESIOLOGY

## 2019-01-15 PROCEDURE — 25000132 ZZH RX MED GY IP 250 OP 250 PS 637: Performed by: ADVANCED PRACTICE MIDWIFE

## 2019-01-15 PROCEDURE — 85730 THROMBOPLASTIN TIME PARTIAL: CPT | Performed by: ADVANCED PRACTICE MIDWIFE

## 2019-01-15 PROCEDURE — 85384 FIBRINOGEN ACTIVITY: CPT | Performed by: ADVANCED PRACTICE MIDWIFE

## 2019-01-15 PROCEDURE — 3E0R3BZ INTRODUCTION OF ANESTHETIC AGENT INTO SPINAL CANAL, PERCUTANEOUS APPROACH: ICD-10-PCS | Performed by: ANESTHESIOLOGY

## 2019-01-15 PROCEDURE — 25000125 ZZHC RX 250: Performed by: STUDENT IN AN ORGANIZED HEALTH CARE EDUCATION/TRAINING PROGRAM

## 2019-01-15 RX ORDER — ONDANSETRON 2 MG/ML
4 INJECTION INTRAMUSCULAR; INTRAVENOUS EVERY 6 HOURS PRN
Status: DISCONTINUED | OUTPATIENT
Start: 2019-01-15 | End: 2019-01-16

## 2019-01-15 RX ORDER — NALOXONE HYDROCHLORIDE 0.4 MG/ML
.1-.4 INJECTION, SOLUTION INTRAMUSCULAR; INTRAVENOUS; SUBCUTANEOUS
Status: DISCONTINUED | OUTPATIENT
Start: 2019-01-15 | End: 2019-01-15

## 2019-01-15 RX ORDER — IBUPROFEN 800 MG/1
800 TABLET, FILM COATED ORAL
Status: DISCONTINUED | OUTPATIENT
Start: 2019-01-15 | End: 2019-01-16

## 2019-01-15 RX ORDER — ACETAMINOPHEN 325 MG/1
650 TABLET ORAL EVERY 4 HOURS PRN
Status: DISCONTINUED | OUTPATIENT
Start: 2019-01-15 | End: 2019-01-16

## 2019-01-15 RX ORDER — OXYTOCIN/0.9 % SODIUM CHLORIDE 30/500 ML
1-24 PLASTIC BAG, INJECTION (ML) INTRAVENOUS CONTINUOUS
Status: DISCONTINUED | OUTPATIENT
Start: 2019-01-15 | End: 2019-01-16

## 2019-01-15 RX ORDER — LIDOCAINE 40 MG/G
CREAM TOPICAL
Status: DISCONTINUED | OUTPATIENT
Start: 2019-01-15 | End: 2019-01-16

## 2019-01-15 RX ORDER — NALOXONE HYDROCHLORIDE 0.4 MG/ML
.1-.4 INJECTION, SOLUTION INTRAMUSCULAR; INTRAVENOUS; SUBCUTANEOUS
Status: DISCONTINUED | OUTPATIENT
Start: 2019-01-15 | End: 2019-01-16

## 2019-01-15 RX ORDER — MISOPROSTOL 200 UG/1
TABLET ORAL
Status: DISCONTINUED
Start: 2019-01-15 | End: 2019-01-15 | Stop reason: HOSPADM

## 2019-01-15 RX ORDER — FAMOTIDINE 20 MG/1
20 TABLET, FILM COATED ORAL 2 TIMES DAILY
Status: DISCONTINUED | OUTPATIENT
Start: 2019-01-15 | End: 2019-01-16

## 2019-01-15 RX ORDER — CARBOPROST TROMETHAMINE 250 UG/ML
250 INJECTION, SOLUTION INTRAMUSCULAR
Status: DISCONTINUED | OUTPATIENT
Start: 2019-01-15 | End: 2019-01-16

## 2019-01-15 RX ORDER — FENTANYL CITRATE 50 UG/ML
50-100 INJECTION, SOLUTION INTRAMUSCULAR; INTRAVENOUS
Status: DISCONTINUED | OUTPATIENT
Start: 2019-01-15 | End: 2019-01-16

## 2019-01-15 RX ORDER — OXYTOCIN/0.9 % SODIUM CHLORIDE 30/500 ML
100-340 PLASTIC BAG, INJECTION (ML) INTRAVENOUS CONTINUOUS PRN
Status: DISCONTINUED | OUTPATIENT
Start: 2019-01-15 | End: 2019-01-16

## 2019-01-15 RX ORDER — OXYCODONE AND ACETAMINOPHEN 5; 325 MG/1; MG/1
1 TABLET ORAL
Status: DISCONTINUED | OUTPATIENT
Start: 2019-01-15 | End: 2019-01-16

## 2019-01-15 RX ORDER — EPHEDRINE SULFATE 50 MG/ML
5 INJECTION, SOLUTION INTRAMUSCULAR; INTRAVENOUS; SUBCUTANEOUS
Status: DISCONTINUED | OUTPATIENT
Start: 2019-01-15 | End: 2019-01-16

## 2019-01-15 RX ORDER — OXYTOCIN 10 [USP'U]/ML
10 INJECTION, SOLUTION INTRAMUSCULAR; INTRAVENOUS
Status: DISCONTINUED | OUTPATIENT
Start: 2019-01-15 | End: 2019-01-16

## 2019-01-15 RX ORDER — SODIUM CHLORIDE, SODIUM LACTATE, POTASSIUM CHLORIDE, CALCIUM CHLORIDE 600; 310; 30; 20 MG/100ML; MG/100ML; MG/100ML; MG/100ML
INJECTION, SOLUTION INTRAVENOUS CONTINUOUS
Status: DISCONTINUED | OUTPATIENT
Start: 2019-01-15 | End: 2019-01-16

## 2019-01-15 RX ORDER — LIDOCAINE HYDROCHLORIDE 10 MG/ML
INJECTION, SOLUTION INFILTRATION; PERINEURAL
Status: DISCONTINUED
Start: 2019-01-15 | End: 2019-01-15 | Stop reason: HOSPADM

## 2019-01-15 RX ORDER — NALBUPHINE HYDROCHLORIDE 10 MG/ML
2.5-5 INJECTION, SOLUTION INTRAMUSCULAR; INTRAVENOUS; SUBCUTANEOUS EVERY 6 HOURS PRN
Status: DISCONTINUED | OUTPATIENT
Start: 2019-01-15 | End: 2019-01-16

## 2019-01-15 RX ORDER — METHYLERGONOVINE MALEATE 0.2 MG/ML
200 INJECTION INTRAVENOUS
Status: DISCONTINUED | OUTPATIENT
Start: 2019-01-15 | End: 2019-01-16

## 2019-01-15 RX ORDER — OXYTOCIN 10 [USP'U]/ML
INJECTION, SOLUTION INTRAMUSCULAR; INTRAVENOUS
Status: DISCONTINUED
Start: 2019-01-15 | End: 2019-01-15 | Stop reason: WASHOUT

## 2019-01-15 RX ADMIN — FAMOTIDINE 20 MG: 20 TABLET ORAL at 19:03

## 2019-01-15 RX ADMIN — SODIUM CHLORIDE, POTASSIUM CHLORIDE, SODIUM LACTATE AND CALCIUM CHLORIDE: 600; 310; 30; 20 INJECTION, SOLUTION INTRAVENOUS at 10:41

## 2019-01-15 RX ADMIN — FENTANYL CITRATE 100 MCG: 50 INJECTION, SOLUTION INTRAMUSCULAR; INTRAVENOUS at 17:06

## 2019-01-15 RX ADMIN — OXYTOCIN-SODIUM CHLORIDE 0.9% IV SOLN 30 UNIT/500ML 2 MILLI-UNITS/MIN: 30-0.9/5 SOLUTION at 10:41

## 2019-01-15 RX ADMIN — SODIUM CHLORIDE, POTASSIUM CHLORIDE, SODIUM LACTATE AND CALCIUM CHLORIDE: 600; 310; 30; 20 INJECTION, SOLUTION INTRAVENOUS at 21:07

## 2019-01-15 RX ADMIN — Medication 5 ML: at 19:07

## 2019-01-15 RX ADMIN — SODIUM CHLORIDE, POTASSIUM CHLORIDE, SODIUM LACTATE AND CALCIUM CHLORIDE 1000 ML: 600; 310; 30; 20 INJECTION, SOLUTION INTRAVENOUS at 17:51

## 2019-01-15 RX ADMIN — FAMOTIDINE 20 MG: 20 TABLET ORAL at 12:01

## 2019-01-15 RX ADMIN — Medication: at 18:50

## 2019-01-15 ASSESSMENT — ACTIVITIES OF DAILY LIVING (ADL)
TRANSFERRING: 0-->INDEPENDENT
RETIRED_COMMUNICATION: 0-->UNDERSTANDS/COMMUNICATES WITHOUT DIFFICULTY
RETIRED_EATING: 0-->INDEPENDENT
COGNITION: 0 - NO COGNITION ISSUES REPORTED
BATHING: 0-->INDEPENDENT
SWALLOWING: 0-->SWALLOWS FOODS/LIQUIDS WITHOUT DIFFICULTY
DRESS: 0-->INDEPENDENT
FALL_HISTORY_WITHIN_LAST_SIX_MONTHS: NO
TOILETING: 0-->INDEPENDENT
AMBULATION: 0-->INDEPENDENT

## 2019-01-15 ASSESSMENT — MIFFLIN-ST. JEOR: SCORE: 1369.93

## 2019-01-15 ASSESSMENT — COLUMBIA-SUICIDE SEVERITY RATING SCALE - C-SSRS
2. HAVE YOU ACTUALLY HAD ANY THOUGHTS OF KILLING YOURSELF IN THE PAST MONTH?: NO
1. IN THE PAST MONTH, HAVE YOU WISHED YOU WERE DEAD OR WISHED YOU COULD GO TO SLEEP AND NOT WAKE UP?: NO
6. HAVE YOU EVER DONE ANYTHING, STARTED TO DO ANYTHING, OR PREPARED TO DO ANYTHING TO END YOUR LIFE?: NO

## 2019-01-15 NOTE — H&P
ADMIT NOTE  =================  39w4d  Akua Gr is a 33 year old female     with an Patient's last menstrual period was 2018 (exact date). and Estimated Date of Delivery: 2019 is [facility] the [facility] on 1/15/2019 at 5:35 AM  in in early labor.   Fetal movement- active  ROM- yes, scant  clear   GBS- negative    HPI  ================  Akua woke around 4am today with a gush of fluid. Went to the bathroom and passed a blood clot into the toilet. Noted small amount of bright red bleeding on her pad as well. She states that she took some castor oil and ate fast food this evening to try to encourage labor. Complains of contractions on admission.   FOB- is involved, Dereck    PRENATAL COURSE  =================  Prenatal course was complicated by low lying placenta, resolved per MFM.  History of previous C/S with her last birth.     HISTORIES  ============  Allergies   Allergen Reactions     Penicillins Hives     Past Medical History:   Diagnosis Date     Anemia      Past Surgical History:   Procedure Laterality Date      SECTION     .  Family History   Problem Relation Age of Onset     Hypertension Mother      Hypertension Maternal Grandmother      Social History     Tobacco Use     Smoking status: Current Every Day Smoker     Packs/day: 0.25     Types: Cigarettes     Smokeless tobacco: Never Used     Tobacco comment: 1-2 cig/day   Substance Use Topics     Alcohol use: No     Obstetric History       T4      L5     SAB0   TAB0   Ectopic0   Multiple1   Live Births6       # Outcome Date GA Lbr Kip/2nd Weight Sex Delivery Anes PTL Lv   9 Current            8 AB 13           7A  13 26w3d  0.78 kg (1 lb 11.5 oz) F -SEC  Y RIGOBERTO   7B  13 26w3d  0.78 kg (1 lb 11.5 oz) M -SEC  Y ND   6 Term 12 40w0d  3.09 kg (6 lb 13 oz) F   N RIGOBERTO      Complications: IUGR (intrauterine growth restriction) affecting care of mother   5 Term  "05 42w0d  3.062 kg (6 lb 12 oz) F   N RIGOBERTO   4 Term 01 40w0d  2.807 kg (6 lb 3 oz) M   N RIGOBERTO   3 Term 00 38w0d  2.381 kg (5 lb 4 oz) M   N RIGOBERTO   2 AB            1 AB      TAB              LABS:   ===========  Rhogam not indicated  Lab Results   Component Value Date    ABO AB 08/15/2018       Lab Results   Component Value Date    RH Pos 08/15/2018     RUBELLAABIGG Immune  RPR nonreactive  HIV nonreactive  Lab Results   Component Value Date    HGB 9.0 2018      Lab Results   Component Value Date    HEPBANG Nonreactive 08/15/2018     Lab Results   Component Value Date    GBS Negative 2018         ROS  =========  Pt denies significant respiratory, cardiovacular, GI, or muscular/skeletalcomplaints.    See RN data base ROS.     PHYSICAL EXAM:  ===============  Blood pressure 129/85, pulse 107, temperature 97.9  F (36.6  C), temperature source Oral, resp. rate 16, height 1.575 m (5' 2\"), weight 71.7 kg (158 lb), last menstrual period 2018, SpO2 97 %, not currently breastfeeding.  General appearance: comfortable  Heart: RRR without murmur  Lungs: clear to auscultation   Neuro: denies headache and visual disturbances  Psych: Mentation normal and bright   Legs: 2+/2+, no clonus, no edema      Abdomen: gravid, single vertex fetus, non-tender between contractions.  EFW-  7 lbs.   CONTACTIONS: Contractions every 2-4 minutes.  Palpate: mild  FETAL HEART TONES: baseline 120 with moderate FHR variability and no accelerations. No decelerations present.      PELVIC EXAM: 1-2/ 70/ Posterior/ soft/ -3   VARGAS SCORE: 5  BLOODY SHOW: yes    ROM: Yes  FLUID: clear  AMNISURE: positive    ASSESSMENT:  ==============  IUP @ 39w4d in in early labor   NST REACTIVE-no accelerations yet  CST NOT DONE  Fetal Heart rate tracing Category category one  GBS- negative  TOLAC  SROM x 2 hours     PLAN:  ===========  Admit - see IP orders  Anticipate    Consider augmentation of labor    Warner EVANS" BRAD Quiñones

## 2019-01-15 NOTE — PROGRESS NOTES
"  S:Sleeping. States that contractions are increasing in intensity however are still infrequent.    O:  Blood pressure 132/82, pulse 107, temperature 98.2  F (36.8  C), temperature source Oral, resp. rate 16, height 1.575 m (5' 2\"), weight 71.7 kg (158 lb), last menstrual period 2018, SpO2 97 %, not currently breastfeeding.  General appearance: comfortable    CONTRACTIONS: Contractions every8-11 minutes.  Palpate: moderate  FETAL HEART TONES: baseline 115 with moderate FHR variability and    accelerations yes. Decelerations no.    NST: REACTIVE  CST: NEGATIVE  ROM: clear fluid  PELVIC EXAM:deferred  Fetal Position:  cephalic  Bloody show: Yes , scant  Pitocin- none,  Antibiotics- none  Cervical ripening: N/A  ASSESSMENT:  ==============  IUP @ 39w4d early labor and rupture of membranes with no labor   Fetal Heart rate tracing Category category one  GBS- negative  Hx of previous  birth in  for twins.    PLAN:  ===========  comfort measures prn   Pain medication   Anticipate   reevaluate in 2-4 hours/PRN   Discussed patient with Dr Nicole, on call OB/GYN and she is aware of patient on unit.     Maria E Sears, PAULM, CNM    "

## 2019-01-15 NOTE — PROGRESS NOTES
"  S:Feeling a little more painful contractions.     O:  Blood pressure 115/68, pulse 107, temperature 98.1  F (36.7  C), temperature source Oral, resp. rate 20, height 1.575 m (5' 2\"), weight 71.7 kg (158 lb), last menstrual period 2018, SpO2 97 %, not currently breastfeeding.  General appearance: comfortable    CONTRACTIONS: Contractions every 2-6 minutes.  Palpate: moderate  FETAL HEART TONES: baseline 120 with moderate FHR variability and    accelerations yes. Decelerations no.    NST: REACTIVE  ROM: clear fluid  PELVIC EXAM:PELVIC EXAM: 2.5/ 80%/ Mid/ soft/ -1   Fetal Position:  cephalic  Bloody show: No  Pitocin- 4 mu/min.,  Antibiotics- none  Cervical ripening: N/A  ASSESSMENT:  ==============  IUP @ 39w4d early labor   Fetal Heart rate tracing Category category one  GBS- negative  Hx of previous  delivery, desires TOLAC     PLAN:  ===========  comfort measures prn   Pain medication   Anticipate   Labor augmentation with Pitocin  reevaluate in 2-4 hours/PRN     Maria E Sears CNM, BRAD    "

## 2019-01-15 NOTE — PROGRESS NOTES
"  S:Akua is walking the hallway. States that contractions are now farther apart and not felt by her.     O:  Blood pressure 109/72, pulse 107, temperature 98.2  F (36.8  C), temperature source Oral, resp. rate 18, height 1.575 m (5' 2\"), weight 71.7 kg (158 lb), last menstrual period 2018, SpO2 97 %, not currently breastfeeding.  General appearance: comfortable    CONTRACTIONS: Contractions every 8-11 minutes.  Palpate: mild  FETAL HEART TONES: baseline 115 with moderate FHR variability and    accelerations yes. Decelerations no.    NST: REACTIVE  CST: NEGATIVE  ROM: clear fluid  PELVIC EXAM:deferred  Fetal Position:  cephalic  Bloody show: No  Pitocin- none,  Antibiotics- none  Cervical ripening: N/A  ASSESSMENT:  ==============  IUP @ 39w4d early labor   Fetal Heart rate tracing Category category one  GBS- negative  Hx of previous    PLAN:  ===========  comfort measures prn   Pain medication per patient request  Anticipate   Labor augmentation with Pitocin  reevaluate in 2-4 hours/PRN     Maria E Sears, BRAD, PAULM    "

## 2019-01-15 NOTE — PROGRESS NOTES
"  S:Feeling stronger contractions and more pelvic pressure with them. Sitting in rocking chair and plans to use hydrotherapy soon.     O:  Blood pressure 122/74, pulse 107, temperature 98  F (36.7  C), temperature source Oral, resp. rate 20, height 1.575 m (5' 2\"), weight 71.7 kg (158 lb), last menstrual period 2018, SpO2 97 %, not currently breastfeeding.  General appearance: mildly uncomfortable with contractions    CONTRACTIONS: Contractions every 2-4 minutes.  Palpate: moderate  FETAL HEART TONES: baseline 125 with moderate FHR variability and    accelerations yes. Decelerations no.    NST: REACTIVE  CST: NEGATIVE  ROM: clear fluid  PELVIC EXAM:deferred  Fetal Position:  cephalic  Bloody show: Yes   Pitocin- 5 mu/min.,  Antibiotics- none  Cervical ripening: N/A  ASSESSMENT:  ==============  IUP @ 39w4d early labor and IOL for ROM without labor   Fetal Heart rate tracing Category category one  GBS- negative  Hx of previous    PLAN:  ===========  Pain medication per patient request  Anticipate   Labor augmentation with Pitocin  reevaluate in 2-4 hours/PRN     Maria E Sears CNM, PAULM    "

## 2019-01-15 NOTE — PLAN OF CARE
Afebrile. Pt denies bleeding, states ctx's starting to be stronger than before. Pitocin running at 5 mill units. Change positioning, ambulating, aromatherapy done and hot pack applied.  at bedside.

## 2019-01-15 NOTE — PLAN OF CARE
Afebrile, VSS. Pt having small amount of bldg and leaking clear fluid. Ctx irregularly, pt feeling crampy. EFM as charted. Plan to see how pt progresses and start low dose Pit if needed. Continue to monitor, contact CNM with questions/concerns.

## 2019-01-15 NOTE — ED NOTES
Pt arrived to ED with complaint of water broke  Pt reports 39 weeks pregnant.   Pt is having contractions Yes.   Pt feels urge to push No  Pt reports water broke Yes.   Report was called and pt was transferred to L&D Yes.

## 2019-01-15 NOTE — PLAN OF CARE
Data: Patient presented to [facility] at 0420.   Reason for maternal/fetal assessment per patient is Contractions (SROM)  .  Patient is a . Prenatal record reviewed.      Obstetric History       T4      L5     SAB0   TAB0   Ectopic0   Multiple1   Live Births6       # Outcome Date GA Lbr Kip/2nd Weight Sex Delivery Anes PTL Lv   9 Current            8 AB 13           7A  13 26w3d  0.78 kg (1 lb 11.5 oz) F -SEC  Y RIGOBERTO   7B  13 26w3d  0.78 kg (1 lb 11.5 oz) M -SEC  Y ND   6 Term 12 40w0d  3.09 kg (6 lb 13 oz) F   N RIGOBERTO      Complications: IUGR (intrauterine growth restriction) affecting care of mother   5 Term 05 42w0d  3.062 kg (6 lb 12 oz) F   N RIGOBERTO   4 Term 01 40w0d  2.807 kg (6 lb 3 oz) M   N RIGOBERTO   3 Term 00 38w0d  2.381 kg (5 lb 4 oz) M   N RIGOBERTO   2 AB            1 AB      TAB         . Medical history:   Past Medical History:   Diagnosis Date     Anemia    . Gestational Age 39w4d. VSS. Fetal movement present. Patient denies  backache, vaginal discharge, pelvic pressure, UTI symptoms, GI problems, , vaginal bleeding, edema, headache, visual disturbances, epigastric or URQ pain, abdominal pain. Pt reports sm amt of vaginal bleeding, leaking clear to bloody fluid, since 0, et contractions. Support persons are present.  Action: Verbal consent for EFM. Triage assessment completed. EFM applied for uterine/fetal assessment..  Fetal assessment: Presumed adequate fetal oxygenation documented (see flow record).   Response: Nuria SALINAS informed of pts arrival 0445. Plan per provider, observe for SROM et/or labor. Patient verbalized agreement with plan. oriented to room and call light. Report given to CEASAR Peace RN  @ 0450.

## 2019-01-15 NOTE — ANESTHESIA PREPROCEDURE EVALUATION
Anesthesia Pre-Procedure Evaluation    Patient: Akua Gr   MRN:     [id number] Gender:   female   Age:    33 year old :      1985        Preoperative Diagnosis: * No surgery found *        Past Medical History:   Diagnosis Date     Anemia       Past Surgical History:   Procedure Laterality Date      SECTION            Anesthesia Evaluation       history and physical reviewed .      No history of anesthetic complications          ROS/MED HX    ENT/Pulmonary:  - neg pulmonary ROS     Neurologic:  - neg neurologic ROS     Cardiovascular:  - neg cardiovascular ROS       METS/Exercise Tolerance:     Hematologic:         Musculoskeletal:         GI/Hepatic:  - neg GI/hepatic ROS       Renal/Genitourinary:         Endo:         Psychiatric:         Infectious Disease:         Malignancy:         Other:                     J[facility] AN PHYSICAL EXAM    Lab Results   Component Value Date    WBC 12.6 (H) 01/15/2019    HGB 9.2 (L) 01/15/2019    HCT 27.4 (L) 01/15/2019     01/15/2019     2018    POTASSIUM 3.8 2018    CHLORIDE 108 2018    CO2 21 2018    BUN 11 2018    CR 0.68 2018    GLC 75 2018    TENNILLE 8.7 2018    ALBUMIN 3.4 2018    PROTTOTAL 7.7 2018    ALT 19 2018    AST 24 2018    ALKPHOS 50 2018    BILITOTAL 0.6 2018    LIPASE 162 2018    PTT 30 01/15/2019    INR 1.00 01/15/2019    FIBR 463 (H) 01/15/2019    HCG Positive (A) 2018       Preop Vitals  BP Readings from Last 3 Encounters:   01/15/19 125/83   19 133/84   19 124/77    Pulse Readings from Last 3 Encounters:   01/15/19 107   19 72   19 87      Resp Readings from Last 3 Encounters:   01/15/19 20   19 16   19 18    SpO2 Readings from Last 3 Encounters:   01/15/19 97%   19 98%   19 95%      Temp Readings from Last 1 Encounters:   01/15/19 36.7  C (98  F) (Oral)    Ht Readings from Last 1  "Encounters:   01/15/19 1.575 m (5' 2\")      Wt Readings from Last 1 Encounters:   01/15/19 71.7 kg (158 lb)    Estimated body mass index is 28.9 kg/m  as calculated from the following:    Height as of this encounter: 1.575 m (5' 2\").    Weight as of this encounter: 71.7 kg (158 lb).     LDA:  Peripheral IV 01/15/19 Left Lower forearm (Active)   Site Assessment WDL 1/15/2019  2:31 PM   Line Status Infusing 1/15/2019  2:31 PM   Phlebitis Scale 0-->no symptoms 1/15/2019  2:31 PM   Infiltration Scale 0 1/15/2019  2:31 PM   Infiltration Site Treatment Method  None 1/15/2019  2:31 PM   Number of days: 0            Assessment:   ASA SCORE: 2       Documentation: H&P complete   Proceeding: Proceed without further delay     Plan:   Anes. Type:  Regional     RA Details:  Labor/OB Procedure; Catheter     RA-Location/Type: Epidural (L)            Access/Monitoring: PIV   Maintenance: LA-Catheter           Postop Pain/Sedation Strategy:  Advanced Options: LA-Catheter     PONV Management: NO PONV Prevention Needed  Adult Risk Factors: Female, Non-Smoker     CONSENT: Direct conversation   Plan and risks discussed with: Patient                neg OB LEVI Linn MD  "

## 2019-01-15 NOTE — TELEPHONE ENCOUNTER
"Pt calling as she is due for delivery this Friday and has been having contractions all day. For the last hour contractions with back pain every 4 minutes. She had gone to hospital 2 days ago with similar symptoms and was sent home. She is 10-15 minutes from the hospital. I advised that she have her  bring her to the hospital now while I page the on call midwife. The  is not with her yet.   8:09 pm Via [employer] I paged Libia Guido midwife for [facility] to call the patient at [phone]. Pt knows to call us back for re page if she has not heard from provider within 20 minutes.     Carola Villar RN, [facility]      Reason for Disposition    [1] History of prior delivery (multipara) AND [2] contractions < 10 minutes apart AND [3] present 1 hour    Additional Information    Negative: Passed out (i.e., lost consciousness, collapsed and was not responding)    Negative: Shock suspected (e.g., cold/pale/clammy skin, too weak to stand, low BP, rapid pulse)    Negative: Difficult to awaken or acting confused  (e.g., disoriented, slurred speech)    Negative: [1] SEVERE abdominal pain (e.g., excruciating) AND [2] constant AND [3] present > 1 hour    Negative: Severe bleeding (e.g., continuous red blood from vagina, or large blood clots)    Negative: Umbilical cord hanging out of the vagina (shiny, white, curled appearance, \"like telephone cord\")    Negative: Uncontrollable urge to push (i.e., feels like baby is coming out now)    Negative: Can see baby    Negative: Sounds like a life-threatening emergency to the triager    Negative: Pregnant < 37 weeks (i.e., )    Negative: [1] Uncertain delivery date AND [2] possibly pregnant < 37 weeks (i.e., )    Negative: [1] First baby (primipara) AND [2] contractions < 6 minutes apart  AND [3] present 2 hours    Protocols used: PREGNANCY - LABOR-ADULT-AH      "

## 2019-01-15 NOTE — PLAN OF CARE
Data: Patient presented to [facility] at 0435.   Reason for maternal/fetal assessment per patient is Contractions (SROM)  .  Patient is a . Prenatal record reviewed.      Obstetric History       T4      L5     SAB0   TAB0   Ectopic0   Multiple1   Live Births6       # Outcome Date GA Lbr Kip/2nd Weight Sex Delivery Anes PTL Lv   9 Current            8 AB 13           7A  13 26w3d  0.78 kg (1 lb 11.5 oz) F -SEC  Y RIGOBERTO   7B  13 26w3d  0.78 kg (1 lb 11.5 oz) M -SEC  Y ND   6 Term 12 40w0d  3.09 kg (6 lb 13 oz) F   N RIGOBERTO      Complications: IUGR (intrauterine growth restriction) affecting care of mother   5 Term 05 42w0d  3.062 kg (6 lb 12 oz) F   N RIGOBERTO   4 Term 01 40w0d  2.807 kg (6 lb 3 oz) M   N RIGOBERTO   3 Term 00 38w0d  2.381 kg (5 lb 4 oz) M   N RIGOBERTO   2 AB            1 AB      TAB         . Medical history:   Past Medical History:   Diagnosis Date     Anemia    . Gestational Age 39w4d. VSS. Fetal movement present. Patient denies cramping, backache, vaginal discharge, pelvic pressure, UTI symptoms, GI problems, bloody show, vaginal bleeding, edema, headache, visual disturbances, epigastric or URQ pain, abdominal pain, rupture of membranes. FOB Dereck present.  Action: Verbal consent for EFM. Triage assessment completed. EFM applied for nonreactive NST but moderate variability w/no decels. Uterine assessment irregular ctx. Fetal assessment: Presumed adequate fetal oxygenation documented (see flow record).   Response: Warner Quiñones CNM informed of pt arrival. Plan per provider is admit to L&D. Patient verbalized agreement with plan. Patient transferred to room 442 ambulatory, oriented to room and call light.

## 2019-01-15 NOTE — PROGRESS NOTES
"  S:Feeling more pain with contractions. Requesting IV Fentanyl now. Would like cervix check before.     O:  Blood pressure 125/83, pulse 107, temperature 98  F (36.7  C), temperature source Oral, resp. rate 20, height 1.575 m (5' 2\"), weight 71.7 kg (158 lb), last menstrual period 2018, SpO2 97 %, not currently breastfeeding.  General appearance: uncomfortable with contractions    CONTRACTIONS: Contractions every 2-4 minutes.  Palpate: moderate  FETAL HEART TONES: baseline 125 with moderate FHR variability and    accelerations yes. Decelerations no.    NST: REACTIVE  CST: NEGATIVE  ROM: clear fluid  PELVIC EXAM:PELVIC EXAM: 3.5/ 80%/ Posterior/ average/ -1 , aRom of forebag with exam.   Fetal Position:  cephalic  Bloody show: Yes   A small amount of blood clots present on glove after cervix exam. The contraction after arom she had a gush of dark red bloody fluid ~weighted 58cc, likely blood mixed with amniotic fluid.   Pitocin- 6 mu/min.,  Antibiotics- none  Cervical ripening: N/A  ASSESSMENT:  ==============  IUP @ 39w4d early labor and good progress   Fetal Heart rate tracing Category category one  GBS- negative  Hx of prior  section for twins at 26wks. Hx of 4 vaginal births prior.  Increased vaginal bleeding in larger than expected amounts.     PLAN:  ===========  Pain medication   Anticipate   MD consultant on call Dr Nicole called and consulted about increased vaginal bleeding/ available prn. Recommend proceeding with IOL and consider epidural placement if emergent delivery becomes necessary  Labor augmentation with Pitocin  reevaluate in 2-4 hours/PRN     Maria E Sears CNM, BRAD    "

## 2019-01-16 PROCEDURE — 12000001 ZZH R&B MED SURG/OB UMMC

## 2019-01-16 PROCEDURE — 25000132 ZZH RX MED GY IP 250 OP 250 PS 637: Performed by: ADVANCED PRACTICE MIDWIFE

## 2019-01-16 PROCEDURE — 25000125 ZZHC RX 250: Performed by: ADVANCED PRACTICE MIDWIFE

## 2019-01-16 PROCEDURE — 72200001 ZZH LABOR CARE VAGINAL DELIVERY SINGLE

## 2019-01-16 RX ORDER — FERROUS SULFATE 325(65) MG
325 TABLET ORAL
Qty: 30 TABLET | Refills: 0 | Status: SHIPPED | OUTPATIENT
Start: 2019-01-17 | End: 2019-01-17

## 2019-01-16 RX ORDER — OXYCODONE HYDROCHLORIDE 5 MG/1
5 TABLET ORAL EVERY 4 HOURS PRN
Status: DISCONTINUED | OUTPATIENT
Start: 2019-01-16 | End: 2019-01-18 | Stop reason: HOSPADM

## 2019-01-16 RX ORDER — BISACODYL 10 MG
10 SUPPOSITORY, RECTAL RECTAL DAILY PRN
Status: DISCONTINUED | OUTPATIENT
Start: 2019-01-18 | End: 2019-01-18 | Stop reason: HOSPADM

## 2019-01-16 RX ORDER — AMOXICILLIN 250 MG
1 CAPSULE ORAL 2 TIMES DAILY
Status: DISCONTINUED | OUTPATIENT
Start: 2019-01-16 | End: 2019-01-18 | Stop reason: HOSPADM

## 2019-01-16 RX ORDER — AMOXICILLIN 250 MG
1 CAPSULE ORAL 2 TIMES DAILY PRN
Qty: 60 TABLET | Refills: 0 | Status: SHIPPED | OUTPATIENT
Start: 2019-01-16 | End: 2019-02-15

## 2019-01-16 RX ORDER — NALOXONE HYDROCHLORIDE 0.4 MG/ML
.1-.4 INJECTION, SOLUTION INTRAMUSCULAR; INTRAVENOUS; SUBCUTANEOUS
Status: DISCONTINUED | OUTPATIENT
Start: 2019-01-16 | End: 2019-01-18 | Stop reason: HOSPADM

## 2019-01-16 RX ORDER — ACETAMINOPHEN 325 MG/1
650 TABLET ORAL EVERY 4 HOURS PRN
Status: DISCONTINUED | OUTPATIENT
Start: 2019-01-16 | End: 2019-01-18 | Stop reason: HOSPADM

## 2019-01-16 RX ORDER — OXYTOCIN/0.9 % SODIUM CHLORIDE 30/500 ML
340 PLASTIC BAG, INJECTION (ML) INTRAVENOUS CONTINUOUS PRN
Status: DISCONTINUED | OUTPATIENT
Start: 2019-01-16 | End: 2019-01-18 | Stop reason: HOSPADM

## 2019-01-16 RX ORDER — FERROUS SULFATE 325(65) MG
325 TABLET ORAL
Status: DISCONTINUED | OUTPATIENT
Start: 2019-01-16 | End: 2019-01-18 | Stop reason: HOSPADM

## 2019-01-16 RX ORDER — IBUPROFEN 800 MG/1
800 TABLET, FILM COATED ORAL EVERY 6 HOURS PRN
Qty: 100 TABLET | Refills: 0 | Status: SHIPPED | OUTPATIENT
Start: 2019-01-16 | End: 2019-02-15

## 2019-01-16 RX ORDER — OXYCODONE HYDROCHLORIDE 5 MG/1
5 TABLET ORAL EVERY 4 HOURS PRN
Status: DISCONTINUED | OUTPATIENT
Start: 2019-01-16 | End: 2019-01-16

## 2019-01-16 RX ORDER — OXYTOCIN/0.9 % SODIUM CHLORIDE 30/500 ML
100 PLASTIC BAG, INJECTION (ML) INTRAVENOUS CONTINUOUS
Status: DISCONTINUED | OUTPATIENT
Start: 2019-01-16 | End: 2019-01-18 | Stop reason: HOSPADM

## 2019-01-16 RX ORDER — FAMOTIDINE 20 MG/1
20 TABLET, FILM COATED ORAL 2 TIMES DAILY
Status: DISCONTINUED | OUTPATIENT
Start: 2019-01-16 | End: 2019-01-18 | Stop reason: HOSPADM

## 2019-01-16 RX ORDER — IBUPROFEN 800 MG/1
800 TABLET, FILM COATED ORAL EVERY 6 HOURS PRN
Status: DISCONTINUED | OUTPATIENT
Start: 2019-01-16 | End: 2019-01-18 | Stop reason: HOSPADM

## 2019-01-16 RX ORDER — MISOPROSTOL 200 UG/1
400 TABLET ORAL
Status: DISCONTINUED | OUTPATIENT
Start: 2019-01-16 | End: 2019-01-18 | Stop reason: HOSPADM

## 2019-01-16 RX ORDER — HYDROCORTISONE 2.5 %
CREAM (GRAM) TOPICAL 3 TIMES DAILY PRN
Status: DISCONTINUED | OUTPATIENT
Start: 2019-01-16 | End: 2019-01-18 | Stop reason: HOSPADM

## 2019-01-16 RX ORDER — AMOXICILLIN 250 MG
2 CAPSULE ORAL 2 TIMES DAILY
Status: DISCONTINUED | OUTPATIENT
Start: 2019-01-16 | End: 2019-01-18 | Stop reason: HOSPADM

## 2019-01-16 RX ORDER — OXYTOCIN 10 [USP'U]/ML
10 INJECTION, SOLUTION INTRAMUSCULAR; INTRAVENOUS
Status: DISCONTINUED | OUTPATIENT
Start: 2019-01-16 | End: 2019-01-18 | Stop reason: HOSPADM

## 2019-01-16 RX ORDER — LANOLIN 100 %
OINTMENT (GRAM) TOPICAL
Status: DISCONTINUED | OUTPATIENT
Start: 2019-01-16 | End: 2019-01-18 | Stop reason: HOSPADM

## 2019-01-16 RX ADMIN — IBUPROFEN 800 MG: 800 TABLET ORAL at 22:41

## 2019-01-16 RX ADMIN — OXYCODONE HYDROCHLORIDE 5 MG: 5 TABLET ORAL at 12:19

## 2019-01-16 RX ADMIN — OXYTOCIN-SODIUM CHLORIDE 0.9% IV SOLN 30 UNIT/500ML 340 ML/HR: 30-0.9/5 SOLUTION at 01:45

## 2019-01-16 RX ADMIN — FERROUS SULFATE TAB 325 MG (65 MG ELEMENTAL FE) 325 MG: 325 (65 FE) TAB at 08:13

## 2019-01-16 RX ADMIN — OXYCODONE HYDROCHLORIDE 5 MG: 5 TABLET ORAL at 18:21

## 2019-01-16 RX ADMIN — IBUPROFEN 800 MG: 800 TABLET ORAL at 03:35

## 2019-01-16 RX ADMIN — ACETAMINOPHEN 650 MG: 325 TABLET, FILM COATED ORAL at 05:06

## 2019-01-16 RX ADMIN — FAMOTIDINE 20 MG: 20 TABLET, FILM COATED ORAL at 20:19

## 2019-01-16 RX ADMIN — ACETAMINOPHEN 650 MG: 325 TABLET, FILM COATED ORAL at 13:40

## 2019-01-16 RX ADMIN — SENNOSIDES AND DOCUSATE SODIUM 2 TABLET: 8.6; 5 TABLET ORAL at 20:19

## 2019-01-16 RX ADMIN — IBUPROFEN 800 MG: 800 TABLET ORAL at 09:16

## 2019-01-16 RX ADMIN — ACETAMINOPHEN 650 MG: 325 TABLET, FILM COATED ORAL at 18:21

## 2019-01-16 RX ADMIN — ACETAMINOPHEN 650 MG: 325 TABLET, FILM COATED ORAL at 09:16

## 2019-01-16 RX ADMIN — OXYCODONE HYDROCHLORIDE 5 MG: 5 TABLET ORAL at 22:44

## 2019-01-16 RX ADMIN — IBUPROFEN 800 MG: 800 TABLET ORAL at 15:31

## 2019-01-16 RX ADMIN — DOCUSATE SODIUM AND SENNOSIDES 1 TABLET: 8.6; 5 TABLET, FILM COATED ORAL at 08:13

## 2019-01-16 NOTE — PLAN OF CARE
Data: Akua Gr transferred to 7122 via wheelchair at 0400. Baby transferred via parent's arms.  Action: Receiving unit notified of transfer: Yes. Patient and family notified of room change. Report given to Megan at 0415. Belongings sent to receiving unit. Accompanied by Registered Nurse. Oriented patient to surroundings. Call light within reach. ID bands double-checked with receiving RN.  Response: Patient tolerated transfer and is stable.

## 2019-01-16 NOTE — PROGRESS NOTES
Post Partum Note    Akua Gr      MRN#: [id number]  Age: 33 year old      YOB: 1985      Post-partum day #0    SIGNIFICANT PROBLEMS:  Patient Active Problem List    Diagnosis Date Noted     Labor and delivery indication for care or intervention 01/15/2019     Priority: Medium     Encounter for triage in pregnant patient 2019     Priority: Medium     Low-lying placenta 10/10/2018     Priority: Medium     Follow by MFM        Previous  delivery, antepartum condition or complication 2018     Priority: Medium     Previous C/S for twins gestation @ 26 weeks.  1 twin, was down syndrome,   at 5 months in heart surgery.    6/3/18 Radiology U/S; 6 6/7 weeks BENNETT 19 Radiology U/s 11 4/7 weeks BENNETT 19 MFM U/s posterior low lying placenta, nl fetal survey except left EIF serial growth U/S for hx of SGA infant  18 MFM U/S posterior low lying placenta, 22 5/7 weeks, SARTHAK 14.1, 27 %tile growth, EFW 1 lb, f/u @ 28-30 weeks (10/12/18)   18 MFM U/S posterior, lateral low lying placenta, 1.2 cm away from os, 29 5/7 weeks, SARTHAK 15.5 cm, 51%tile growth, EFW 3 lbs 3 oz, repeat 4 weeks for low lying  18: MFM US low lying placenta 1.8cm away from os, growth WNL. Report: Follow-up is scheduled in four weeks to reassess fetal growth and placental location. Based on today's ultrasound findings delivery <39 weeks is not recommended.  18 MFM U/S; low lying placenta has resolved, 36 6/7 weeks, EFW 5 lbs 14 oz.  No f/u needed.          Need for Tdap vaccination 08/15/2018     Priority: Medium     Abnormal Pap smear of cervix 08/15/2018     Priority: Medium     H/O sickle cell trait 08/15/2018     Priority: Medium     Tobacco use disorder 2013     Priority: Medium     Maternal pyelonephritis, current pregnancy 2012     Priority: Medium     Overview:   Prophylaxis until delivery       Vitamin D deficiency 10/21/2011     Priority: Medium      "Overview:   Plan supplementation         INTERVAL HISTORY:  /78   Pulse 83   Temp 98.5  F (36.9  C) (Oral)   Resp 16   Ht 1.575 m (5' 2\")   Wt 71.7 kg (158 lb)   LMP 04/13/2018 (Exact Date)   SpO2 100%   Breastfeeding? Unknown   BMI 28.90 kg/m      Pt stable, baby is rooming in  Breast feeding status:initiated  Complications since 2 hours post delivery: None  Patient is tolerating acitivity well Voiding without difficulty, cramping is moderate. Pt is taking Ibuprofen and tylenol but says it isn't working very well. Requesting additional medication. Lochia is decreasing and patient denies clots.  Perineal pain is is minimal.  The perineum is intact    Normal postpartum exam     Postpartum hemoglobin   Hemoglobin   Date Value Ref Range Status   01/15/2019 9.2 (L) 11.7 - 15.7 g/dL Final     Blood type   Lab Results   Component Value Date    ABO AB 01/15/2019       Lab Results   Component Value Date    RH Pos 01/15/2019     ASSESSMENT/PLAN:  Stable Post-partum day #0  Complications:anemic, plan for iron supplementation  RTC 4-6 weeks - patient asked for Depo before discharge but we discussed the risk of increased bleeding. She had a lot of bleeding with a placental abruption during labor, 300ml QBL after the birth and she is concerned about how much blood she lost so she will wait until she is seen for a PP visit.   Teaching done: D/C Instructions: Nutrition/Activity, Engorgement Management, Birth Control Options, Warning Signs/When to Call: Excessive Bleeding, Infection, PP Depression and RTC Clinic for PP Appointment    Postpartum warning s/s reviewed, including bleeding/clots, fever, mastitis, or depression    Birthcontrol planned:Depo  Current Discharge Medication List      CONTINUE these medications which have NOT CHANGED    Details   !! famotidine (PEPCID) 20 MG tablet Take 1 tablet (20 mg) by mouth 2 times daily  Qty: 60 tablet, Refills: 1    Associated Diagnoses: Heart burn      !! famotidine " (PEPCID) 20 MG tablet Take 1 tablet (20 mg) by mouth 2 times daily  Qty: 60 tablet, Refills: 1    Associated Diagnoses: Heartburn in pregnancy in second trimester      ferrous sulfate (IRON) 325 (65 Fe) MG tablet Take 1 tablet (325 mg) by mouth daily (with breakfast)  Qty: 60 tablet, Refills: 2    Associated Diagnoses: Iron deficiency anemia, unspecified iron deficiency anemia type      Prenatal Vit-Fe Fumarate-FA (PRENATAL MULTIVITAMIN  WITH IRON) 28-0.8 MG TABS Take 1 tablet by mouth daily  Qty: 30 each, Refills: 0      albuterol (PROAIR HFA, PROVENTIL HFA, VENTOLIN HFA) 108 (90 BASE) MCG/ACT inhaler Inhale 2 puffs into the lungs every 6 hours as needed for shortness of breath / dyspnea or wheezing  Qty: 1 Inhaler, Refills: 0      doxylamine (UNISOM) 25 MG TABS tablet Take 1 tablet (25 mg) by mouth every 8 hours as needed For nausea  Qty: 30 each, Refills: 0      Doxylamine-Pyridoxine 10-10 MG TBEC Take 10 mg by mouth At Bedtime  Qty: 30 tablet, Refills: 0      nicotine (NICODERM CQ) 14 MG/24HR 24 hr patch Place 1 patch onto the skin every 24 hours  Qty: 42 patch, Refills: 0    Comments: Use x6 weeks then go down to 7 mg patches  Associated Diagnoses: Encounter for smoking cessation counseling      nicotine (NICODERM CQ) 7 MG/24HR 24 hr patch Place 1 patch onto the skin every 24 hours  Qty: 14 patch, Refills: 0    Comments: Use x2 weeks after 14 mg patches x6 weeks  Associated Diagnoses: Encounter for smoking cessation counseling      nitroFURantoin, macrocrystal-monohydrate, (MACROBID) 100 MG capsule Take 1 capsule (100 mg) by mouth 2 times daily  Qty: 20 capsule, Refills: 0    Associated Diagnoses: Dysuria      order for DME Equipment being ordered: maternity support belt  Qty: 1 Device, Refills: 0    Associated Diagnoses: Encounter for supervision of other normal pregnancy in third trimester      pyridOXINE (VITAMIN B-6) 25 MG tablet Take 1 tablet (25 mg) by mouth every 8 hours as needed For nausea  Qty: 30  tablet, Refills: 0       !! - Potential duplicate medications found. Please discuss with provider.      Ruma Orantes CNM

## 2019-01-16 NOTE — PLAN OF CARE
Patient has been stable, voiding freely and steady on her legs and able to go outside to smoke. She is bonding well with baby and breastfeeding on demand. Latching baby well as verified. Will continue with plan of care.

## 2019-01-16 NOTE — PLAN OF CARE
Labor Pain Intervention  DATA:  Patient reports increased labor pain during ctxs.  Epidural for pain relief offered; pt agreed having epidural placement.     INTERVENTIONS:  Bolus started, consent signed. Dr Mahoney informed. Questions answered. Epidural administered.   ASSESSMENT:   Tolerated well.  Relief complete.  PLAN:  Continue intrapartum cares and assessments.  Anticipate .

## 2019-01-16 NOTE — L&D DELIVERY NOTE
Delivery Summary    Akua Gr MRN# [id number]   Age: 33 year old YOB: 1985     Brief Labor Course: Akua arrived at the birthplace with mild contractions and leaking scant amnniotic fluid. Labor was augmented with pitocin and AROM of forebag. During her labor course she had several episodes of heavy vaginal bleeding and explusion of large clots. The baby's FHTs remained cat 1. She got an epidural and progressed to complete. She pushed well to deliver a vigorous baby girl who was placed on mom's abdomen. The placenta delivered in the usual fashion and had several clots on one margin suggestive of partial placental abruption. Her bleeding resolved quickly. Her perineum was inspected and intact    ASSESSMENT & PLAN: Successful . Anticipate normal postpartum recovery and discharge in 24-48 hours.        Rupture date/time:     Rupture type:  Spontaneous rupture of membranes occuring during spontaneous labor or augmentation     Delivery/Placenta Date and Time    Delivery Date:  19 Delivery Time:   1:38 AM      Apgars    Living status:  Living   1 Minute 5 Minute 10 Minute 15 Minute 20 Minute   Skin color:        Heart rate:        Reflex irritability:        Muscle tone:        Respiratory effort:        Total:           Labor Events and Shoulder Dystocia    Fetal Tracing Prior to Delivery:  Category 1  Shoulder dystocia present?:  Neg     Delivery (Maternal) (Provider to Complete) ([id number])    Episiotomy:  None  Perineal lacerations:  None    Vaginal laceration?:  No    Cervical laceration?:  No       Blood Loss  Mother: Akua Gr #[id number]   Start of Mother's Information    IO Blood Loss  01/15/19 1338 - 19 0207    Blood Hospital Encounter 106 mL    Total  106 mL         End of Mother's Information  Mother: Akua Gr #[id number]         Delivery - Provider to Complete ([id number])    Delivering clinician:  Maria E Sears APRN CNM  CNM Care:  Exclusive CNM care in  labor  Attempted Delivery Types (Choose all that apply):  Vaginal Birth After   Delivery Type (Choose the 1 that will go to the Birth History):  Vaginal, Spontaneous          Placenta    Delayed Cord Clamping:  Done  Removal:  Spontaneous  Comments:  Clots seen on on margin of one side  Disposition:  Hospital disposal     Presentation and Position    Presentation:  Vertex  Position:  Right Occiput Anterior         Delivery Note  IUP at 39 weeks gestation delivered on 2019 .     delivery of a viable Female infant.  Apgars of 9 at 1 minute and 9 at 5 minutes.  Labor was spontaneous.  Medications administered  in labor:  Pain Rx Epidural; Antibiotics No; Other   Perineum: Intact  Placenta-mechanism: spontaneous, intact,  with a 3 vessel cord. IV oxytocin was given.  Estimated Blood Loss was 250.  Complications of pregnancy, labor and delivery: heavy vaginal bleeding during labor suggestive of placental abruption  Birth attendants: BRAD Wbeb CNM, CHICHI SALINAS

## 2019-01-16 NOTE — ANESTHESIA PROCEDURE NOTES
Epidural Procedure Note    Staff:     Anesthesiologist:  Suman Mahoney MD    Resident/CRNA:  Davi Linn MD    Procedure performed by resident/CRNA in the presence of a teaching physician    Location: OB     Procedure start time:  1/15/2019 6:55 PM     Procedure end time:  1/15/2019 7:10 PM   Pre-procedure checklist:   patient identified, IV checked, site marked, risks and benefits discussed, informed consent, monitors and equipment checked, pre-op evaluation, at physician/surgeon's request and post-op pain management      Correct Patient: Yes      Correct Position: Yes      Correct Site: Yes      Correct Procedure: Yes      Correct Laterality:  Yes    Site Marked:  Yes  Procedure:     Procedure:  Epidural catheter    ASA:  1    Diagnosis:  Labor    Position:  Sitting    Sterile Prep: chloraprep      Insertion site:  L3-4    Local skin infiltration:  1% lidocaine    amount (mL):  5    Approach:  Midline    Needle gauge (G):  17    Needle Length (in):  3.5    Block Needle Type:  Touhy    Injection Technique:  LORT saline    MEENA at (cm):  5    Attempts:  2    Redirects:  2    Catheter gauge (G):  19    Catheter threaded easily: Yes      Threaded to cm at skin:  9    Threaded in epidural space (cm):  4    Paresthesias:  No    Aspiration negative for Heme or CSF: Yes       Local anesthetic:  Lidocaine 1.5% w/ 1:200,000 epinephrine    Test dose time:  19:06    Test dose negative for signs of intravascular, subdural or intrathecal injection: Yes

## 2019-01-16 NOTE — PLAN OF CARE
Pt had gush of bleeding during ctx mixed with amniotic fluid and small clot total 58cc. Pt denies pain in between ctx's. Afebrile. Baby reactive. BRDA Marshall informed and at bedside assessing.

## 2019-01-16 NOTE — PLAN OF CARE
Patient vital signs stable and afebrile.  at 0138 baby girl. Skin to skin and breastfeeding well. Fundus firm and U/U with scant bleeding.  ml and able to void after delivery. Will continue with plan of care.

## 2019-01-16 NOTE — PROGRESS NOTES
"  S:Feeling nauseated and now with more rectal pressure with contractions. However, overall she is comfortable with epidural.     O:  Blood pressure 114/79, pulse 107, temperature 97.9  F (36.6  C), temperature source Oral, resp. rate 16, height 1.575 m (5' 2\"), weight 71.7 kg (158 lb), last menstrual period 2018, SpO2 99 %, not currently breastfeeding.  General appearance: comfortable    CONTRACTIONS: Contractions every 2-3 minutes.  Palpate: moderate  FETAL HEART TONES: baseline 120 with moderate FHR variability and    accelerations yes. Decelerations yes.  Early in timing with contractions   ROM: clear fluid  PELVIC EXAM:PELVIC EXAM: 9/ 100%/ Mid/ soft/ -1   Fetal Position:  cephalic  Bloody show: Yes   Pitocin- 7 mu/min.,  Antibiotics- none  Cervical ripening: N/A  ASSESSMENT:  ==============  IUP @ 39w5d active labor and good progress   Fetal Heart rate tracing Category category one  GBS- negative  Hx of prior      PLAN:  ===========  Pain medication epidural in place  Anticipate   Labor augmentation with Pitocin  reevaluate in 2-4 hours/PRN     Maria E Sears CNM, PAULM    "

## 2019-01-16 NOTE — PROGRESS NOTES
"  S:Comfortable with epidural. Excited to meet her baby. Requesting cervix check.     O:  Blood pressure 117/64, pulse 107, temperature 98  F (36.7  C), temperature source Oral, resp. rate 20, height 1.575 m (5' 2\"), weight 71.7 kg (158 lb), last menstrual period 2018, SpO2 100 %, not currently breastfeeding.  General appearance: comfortable    CONTRACTIONS: Contractions every 2-3 minutes.  Palpate: moderate  FETAL HEART TONES: baseline 120 with moderate FHR variability and    accelerations yes. Decelerations no.    NST: REACTIVE  CST: NEGATIVE  ROM: clear fluid  PELVIC EXAM:PELVIC EXAM: 3.5/ 90%/ Mid/ soft/ -1 AROM done with exam for forbage which was bulging with moderate amounts of clear fluid returning and a tennis ball sized-clot.   Fetal Position:  cephalic  Bloody show: Yes  with large clots.   Pitocin- 7 mu/min.,  Antibiotics- none  Cervical ripening: N/A  ASSESSMENT:  ==============  IUP @ 39w4d minimal/no progress and IOL for ROM and no labor  Vaginal bleeding and concern of a partial placental abrution  Fetal Heart rate tracing Category category one  GBS- negative  Hx of prior  birth for twin delivery, Desires TOLAC. Hx of 4 NSVDs.   PLAN:  ===========  Pain medication epidural infusing r/t bleeding in case of need of emergent delivery. Patient comfortable with epidural  Anticipate   MD consultant on call Dr Nicole/ available prn  Labor augmentation with Pitocin  reevaluate in 2-4 hours/PRN     Maria E Sears CNM, BRAD    "

## 2019-01-16 NOTE — PLAN OF CARE
Patient arrived to [facility] unit via wheelchair at 0405 ,with belongings, accompanied by spouse/ significant other, with infant in arms. Received report from Shelby REAL RN  and checked bands. Unit and room orientation started. Call light given and within arms reach; no concerns present at this time. Fundus firm with scant/light amount of bleeding. Continue with plan of care.

## 2019-01-16 NOTE — PROGRESS NOTES
"  S:Feeling much more contraction pain.     O:  Blood pressure 121/76, pulse 107, temperature 97.9  F (36.6  C), temperature source Oral, resp. rate 20, height 1.575 m (5' 2\"), weight 71.7 kg (158 lb), last menstrual period 2018, SpO2 99 %, not currently breastfeeding.  General appearance: uncomfortable with contractions    CONTRACTIONS: Contractions every 2-3 minutes.  Palpate: moderate  FETAL HEART TONES: baseline 120 with moderate FHR variability and    accelerations yes. Decelerations no.    ROM: clear fluid  PELVIC EXAM:PELVIC EXAM: 4.5/ 100%/ Mid/ soft/ -1   Fetal Position:  cephalic  Bloody show: Yes   Pitocin- 7 mu/min.,  Antibiotics- PCN  Cervical ripening: N/A  ASSESSMENT:  ==============  IUP @ 39w4d active labor   Fetal Heart rate tracing Category category one  GBS- negative     PLAN:  ===========  Pain medication epidural infusing  Anticipate   MD consultant on call/ available prn  Labor augmentation with Pitocin  reevaluate in 2-4 hours/PRN     Maria E Sears, BRAD, PAULM    "

## 2019-01-17 LAB — HGB BLD-MCNC: 7.6 G/DL (ref 11.7–15.7)

## 2019-01-17 PROCEDURE — 36415 COLL VENOUS BLD VENIPUNCTURE: CPT | Performed by: ADVANCED PRACTICE MIDWIFE

## 2019-01-17 PROCEDURE — 25000128 H RX IP 250 OP 636: Performed by: ADVANCED PRACTICE MIDWIFE

## 2019-01-17 PROCEDURE — 12000001 ZZH R&B MED SURG/OB UMMC

## 2019-01-17 PROCEDURE — 85018 HEMOGLOBIN: CPT | Performed by: ADVANCED PRACTICE MIDWIFE

## 2019-01-17 PROCEDURE — 25000132 ZZH RX MED GY IP 250 OP 250 PS 637: Performed by: ADVANCED PRACTICE MIDWIFE

## 2019-01-17 RX ORDER — METHYLPREDNISOLONE SODIUM SUCCINATE 125 MG/2ML
125 INJECTION, POWDER, LYOPHILIZED, FOR SOLUTION INTRAMUSCULAR; INTRAVENOUS
Status: DISCONTINUED | OUTPATIENT
Start: 2019-01-17 | End: 2019-01-18 | Stop reason: HOSPADM

## 2019-01-17 RX ORDER — FERROUS SULFATE 325(65) MG
325 TABLET ORAL 2 TIMES DAILY
Qty: 60 TABLET | Refills: 3 | Status: SHIPPED | OUTPATIENT
Start: 2019-01-17 | End: 2019-02-16

## 2019-01-17 RX ORDER — DIPHENHYDRAMINE HYDROCHLORIDE 50 MG/ML
50 INJECTION INTRAMUSCULAR; INTRAVENOUS
Status: DISCONTINUED | OUTPATIENT
Start: 2019-01-17 | End: 2019-01-18 | Stop reason: HOSPADM

## 2019-01-17 RX ADMIN — OXYCODONE HYDROCHLORIDE 5 MG: 5 TABLET ORAL at 03:55

## 2019-01-17 RX ADMIN — IBUPROFEN 800 MG: 800 TABLET ORAL at 19:42

## 2019-01-17 RX ADMIN — OXYCODONE HYDROCHLORIDE 5 MG: 5 TABLET ORAL at 12:54

## 2019-01-17 RX ADMIN — IBUPROFEN 800 MG: 800 TABLET ORAL at 12:54

## 2019-01-17 RX ADMIN — FAMOTIDINE 20 MG: 20 TABLET, FILM COATED ORAL at 19:41

## 2019-01-17 RX ADMIN — OXYCODONE HYDROCHLORIDE 5 MG: 5 TABLET ORAL at 18:59

## 2019-01-17 RX ADMIN — ACETAMINOPHEN 650 MG: 325 TABLET, FILM COATED ORAL at 18:59

## 2019-01-17 RX ADMIN — ACETAMINOPHEN 650 MG: 325 TABLET, FILM COATED ORAL at 00:10

## 2019-01-17 RX ADMIN — IBUPROFEN 800 MG: 800 TABLET ORAL at 04:45

## 2019-01-17 RX ADMIN — FERROUS SULFATE TAB 325 MG (65 MG ELEMENTAL FE) 325 MG: 325 (65 FE) TAB at 08:21

## 2019-01-17 RX ADMIN — ACETAMINOPHEN 650 MG: 325 TABLET, FILM COATED ORAL at 08:21

## 2019-01-17 RX ADMIN — IRON SUCROSE 300 MG: 20 INJECTION, SOLUTION INTRAVENOUS at 18:54

## 2019-01-17 RX ADMIN — ACETAMINOPHEN 650 MG: 325 TABLET, FILM COATED ORAL at 12:54

## 2019-01-17 RX ADMIN — ACETAMINOPHEN 650 MG: 325 TABLET, FILM COATED ORAL at 23:16

## 2019-01-17 RX ADMIN — OXYCODONE HYDROCHLORIDE 5 MG: 5 TABLET ORAL at 08:21

## 2019-01-17 RX ADMIN — SENNOSIDES AND DOCUSATE SODIUM 2 TABLET: 8.6; 5 TABLET ORAL at 19:41

## 2019-01-17 RX ADMIN — SENNOSIDES AND DOCUSATE SODIUM 2 TABLET: 8.6; 5 TABLET ORAL at 08:22

## 2019-01-17 RX ADMIN — ACETAMINOPHEN 650 MG: 325 TABLET, FILM COATED ORAL at 03:55

## 2019-01-17 NOTE — PLAN OF CARE
Data: Vital signs and postpartum checks WDL  Patient eating and drinking normally. Patient able to empty bladder independently and is up ambulating.  Patient performing self cares and is able to care for infant. Breastfeeding on demand.  Action: Patient medicated during the shift for pain with Tylenol, Oxycodone and Ibuprofen with relief after 1 hour. Patient education done see education record.  Response: Positive attachment behaviors observed with infant. Support persons  present.    Plan: Continue with the plan of care

## 2019-01-17 NOTE — PROGRESS NOTES
Post Partum Note  SIGNIFICANT PROBLEMS:  Patient Active Problem List    Diagnosis Date Noted     Labor and delivery indication for care or intervention 01/15/2019     Priority: Medium     Encounter for triage in pregnant patient 2019     Priority: Medium     Low-lying placenta 10/10/2018     Priority: Medium     Follow by MF        Previous  delivery, antepartum condition or complication 2018     Priority: Medium     Previous C/S for twins gestation @ 26 weeks.  1 twin, was down syndrome,   at 5 months in heart surgery.    6/3/18 Radiology U/S; 6 6/7 weeks BENNETT 19 Radiology U/s 11 4/7 weeks BENNETT 19 MFM U/s posterior low lying placenta, nl fetal survey except left EIF serial growth U/S for hx of SGA infant  18 MFM U/S posterior low lying placenta, 22 5/7 weeks, SARTHAK 14.1, 27 %tile growth, EFW 1 lb, f/u @ 28-30 weeks (10/12/18)   18 MFM U/S posterior, lateral low lying placenta, 1.2 cm away from os, 29 5/7 weeks, SARTHAK 15.5 cm, 51%tile growth, EFW 3 lbs 3 oz, repeat 4 weeks for low lying  18: MFM US low lying placenta 1.8cm away from os, growth WNL. Report: Follow-up is scheduled in four weeks to reassess fetal growth and placental location. Based on today's ultrasound findings delivery <39 weeks is not recommended.  18 MFM U/S; low lying placenta has resolved, 36 6/7 weeks, EFW 5 lbs 14 oz.  No f/u needed.          Need for Tdap vaccination 08/15/2018     Priority: Medium     Abnormal Pap smear of cervix 08/15/2018     Priority: Medium     H/O sickle cell trait 08/15/2018     Priority: Medium     Tobacco use disorder 2013     Priority: Medium     Maternal pyelonephritis, current pregnancy 2012     Priority: Medium     Overview:   Prophylaxis until delivery       Vitamin D deficiency 10/21/2011     Priority: Medium     Overview:   Plan supplementation         INTERVAL HISTORY:  /69   Pulse 64   Temp 98.1  F (36.7  C) (Oral)    "Resp 16   Ht 1.575 m (5' 2\")   Wt 71.7 kg (158 lb)   LMP 2018 (Exact Date)   SpO2 100%   Breastfeeding? Unknown   BMI 28.90 kg/m    Pt stable, baby is rooming in  Breast feeding status:initiated  Complications since 2 hours post delivery: Severe anemia  Patient is tolerating acitivity well Voiding without difficulty, cramping is relieved by Ibuprophen, lochia is decreasing and patient denies clots.  Perineal pain is is relieved by Ibuprophen.  The perineum is intact    Postpartum hemoglobin   Hemoglobin   Date Value Ref Range Status   2019 7.6 (L) 11.7 - 15.7 g/dL Final     Blood type   Lab Results   Component Value Date    ABO AB 01/15/2019       Lab Results   Component Value Date    RH Pos 01/15/2019       ASSESSMENT/PLAN:  Normal postpartum exam   Stable Post-partum day #1  Complications:anemic, plan for iron supplementation  Plan d/c home tomorrow  RTC 6 weeks  Teaching done: D/C Instructions: Nutrition/Activity, Engorgement Management, Birth Control Options, Warning Signs/When to Call: Excessive Bleeding, Infection, PP Depression, Kegals and Crunches, RTC Clinic for PP Appointment, PNV and Iron supplemenation  Birthcontrol planned:Depoprovera  Current Discharge Medication List      START taking these medications    Details   ibuprofen (ADVIL/MOTRIN) 800 MG tablet Take 1 tablet (800 mg) by mouth every 6 hours as needed for other (cramping)  Qty: 100 tablet, Refills: 0    Associated Diagnoses: , delivered      senna-docusate (SENOKOT-S/PERICOLACE) 8.6-50 MG tablet Take 1 tablet by mouth 2 times daily as needed for constipation  Qty: 60 tablet, Refills: 0    Associated Diagnoses: , delivered         CONTINUE these medications which have CHANGED    Details   ferrous sulfate (FEROSUL) 325 (65 Fe) MG tablet Take 1 tablet (325 mg) by mouth 2 times daily  Qty: 60 tablet, Refills: 3    Associated Diagnoses: , delivered         CONTINUE these medications which have NOT CHANGED    Details " "  Prenatal Vit-Fe Fumarate-FA (PRENATAL MULTIVITAMIN  WITH IRON) 28-0.8 MG TABS Take 1 tablet by mouth daily  Qty: 30 each, Refills: 0      albuterol (PROAIR HFA, PROVENTIL HFA, VENTOLIN HFA) 108 (90 BASE) MCG/ACT inhaler Inhale 2 puffs into the lungs every 6 hours as needed for shortness of breath / dyspnea or wheezing  Qty: 1 Inhaler, Refills: 0      nicotine (NICODERM CQ) 14 MG/24HR 24 hr patch Place 1 patch onto the skin every 24 hours  Qty: 42 patch, Refills: 0    Comments: Use x6 weeks then go down to 7 mg patches  Associated Diagnoses: Encounter for smoking cessation counseling      order for DME Equipment being ordered: maternity support belt  Qty: 1 Device, Refills: 0    Associated Diagnoses: Encounter for supervision of other normal pregnancy in third trimester         STOP taking these medications       doxylamine (UNISOM) 25 MG TABS tablet Comments:   Reason for Stopping:         Doxylamine-Pyridoxine 10-10 MG TBEC Comments:   Reason for Stopping:         famotidine (PEPCID) 20 MG tablet Comments:   Reason for Stopping:         famotidine (PEPCID) 20 MG tablet Comments:   Reason for Stopping:         nicotine (NICODERM CQ) 7 MG/24HR 24 hr patch Comments:   Reason for Stopping:         nitroFURantoin, macrocrystal-monohydrate, (MACROBID) 100 MG capsule Comments:   Reason for Stopping:         pyridOXINE (VITAMIN B-6) 25 MG tablet Comments:   Reason for Stopping:             Reports feeling \"light headed,\" tired and light sensitive.  Offered and accepted IV iron.  Ordered.  We will recheck hemoglobin the morning.      Mercy Orozco CNM  "

## 2019-01-17 NOTE — PROGRESS NOTES
Received referral from nursing and order for SW to see regarding insurance/financial questions.     Consulted with [place] financial counselor.  Patient's Medical Assistance  on 18.      Spoke with patient this morning via phone.  She knows her MA has .  She is requesting assistance with new Medical Assistance application.       The financial counselors are the service that assists with MA apps.  Anjali Wayne [phone] states one of the financial counselors will try to meet with patient to complete MA ezequiel before she is discharged.      Akua's only other question was about  photos.  I encouraged her to ask her nurse today about photo services.

## 2019-01-18 VITALS
RESPIRATION RATE: 16 BRPM | HEART RATE: 63 BPM | SYSTOLIC BLOOD PRESSURE: 119 MMHG | OXYGEN SATURATION: 100 % | HEIGHT: 62 IN | WEIGHT: 158 LBS | BODY MASS INDEX: 29.08 KG/M2 | DIASTOLIC BLOOD PRESSURE: 73 MMHG | TEMPERATURE: 98.2 F

## 2019-01-18 LAB — HGB BLD-MCNC: 8.1 G/DL (ref 11.7–15.7)

## 2019-01-18 PROCEDURE — 25000132 ZZH RX MED GY IP 250 OP 250 PS 637: Performed by: ADVANCED PRACTICE MIDWIFE

## 2019-01-18 PROCEDURE — 85018 HEMOGLOBIN: CPT | Performed by: ADVANCED PRACTICE MIDWIFE

## 2019-01-18 PROCEDURE — 36415 COLL VENOUS BLD VENIPUNCTURE: CPT | Performed by: ADVANCED PRACTICE MIDWIFE

## 2019-01-18 RX ORDER — ACETAMINOPHEN 325 MG/1
650 TABLET ORAL EVERY 6 HOURS PRN
Qty: 100 TABLET | Refills: 0 | Status: SHIPPED | OUTPATIENT
Start: 2019-01-18 | End: 2019-07-10

## 2019-01-18 RX ADMIN — IBUPROFEN 800 MG: 800 TABLET ORAL at 02:22

## 2019-01-18 RX ADMIN — OXYCODONE HYDROCHLORIDE 5 MG: 5 TABLET ORAL at 06:20

## 2019-01-18 RX ADMIN — FAMOTIDINE 20 MG: 20 TABLET, FILM COATED ORAL at 08:23

## 2019-01-18 RX ADMIN — ACETAMINOPHEN 650 MG: 325 TABLET, FILM COATED ORAL at 06:20

## 2019-01-18 RX ADMIN — FERROUS SULFATE TAB 325 MG (65 MG ELEMENTAL FE) 325 MG: 325 (65 FE) TAB at 08:23

## 2019-01-18 RX ADMIN — OXYCODONE HYDROCHLORIDE 5 MG: 5 TABLET ORAL at 10:21

## 2019-01-18 RX ADMIN — IBUPROFEN 800 MG: 800 TABLET ORAL at 08:23

## 2019-01-18 RX ADMIN — SENNOSIDES AND DOCUSATE SODIUM 2 TABLET: 8.6; 5 TABLET ORAL at 08:23

## 2019-01-18 RX ADMIN — ACETAMINOPHEN 650 MG: 325 TABLET, FILM COATED ORAL at 10:21

## 2019-01-18 RX ADMIN — OXYCODONE HYDROCHLORIDE 5 MG: 5 TABLET ORAL at 00:38

## 2019-01-18 NOTE — PLAN OF CARE
Data: Vital signs and postpartum checks WDL  Patient eating and drinking normally. Patient able to empty bladder independently and is up ambulating.  Patient performing self cares and is able to care for infant. Breastfeeding on demand. Supplementing with Formula. Requested to see Lactation today.   Action: Patient medicated during the shift for pain with Tylenol, Oxycodone and Ibuprofen with relief after 1 hour. Patient education done see education record. IV Iron was infused and tolerated well.  Response: Positive attachment behaviors observed with infant. Support persons  present.    Plan: Continue with the plan of care

## 2019-01-18 NOTE — DISCHARGE SUMMARY
[facility], [place]    Discharge Summary  Obstetrics    Date of Admission:  1/15/2019  Date of Discharge:  2019  Discharging Provider: Libia Guido    Discharge Diagnoses       History of Present Illness   Akua Gr is a 33 year old female who presented with spontaneous labor and progressed well to . She had no complications during labor. She had a postpartum hemorrhage complication postpartum. She received one dose of IV iron. Her hemoglobin increased from 7.6 to 8.1. She feels better. Having some dizziness when going from laying down for awhile to standing up otherwise no symptoms. She does not want to stay for a second dose of IV iron. We discussed IV iron outpatient if she decides she needs more. She has an RX for iron to take twice daily and we also discussed iron rich foods. She has no other questions or concerns today. Planning to return in 4-6 weeks for the depo shot.     Hospital Course   The patient's hospital course was unremarkable.  She recovered as anticipated and experienced postpartum hemorrhage as a post-delivery complication. On discharge, her pain was well controlled. Vaginal bleeding is similar to peak menstrual flow.  Voiding without difficulty.  Ambulating well and tolerating a normal diet.  No fevers.  Breastfeeding well.  Infant is stable.  She was discharged on post-partum day #2.    Post-partum hemoglobin:   Hemoglobin   Date Value Ref Range Status   2019 8.1 (L) 11.7 - 15.7 g/dL Final       Libia Guido    Discharge Disposition   Discharged to home   Condition at discharge: Stable    Primary Care Physician   [facility]    Consultations This Hospital Stay   HOME CARE POST PARTUM/ IP CONSULT  SOCIAL WORK IP CONSULT  LACTATION IP CONSULT  ANESTHESIOLOGY IP CONSULT    Discharge Orders   No discharge procedures on file.  Discharge Medications   Current Discharge Medication List      START taking these  medications    Details   acetaminophen (TYLENOL) 325 MG tablet Take 2 tablets (650 mg) by mouth every 6 hours as needed for mild pain Start after Delivery.  Qty: 100 tablet, Refills: 0    Associated Diagnoses: , delivered      ibuprofen (ADVIL/MOTRIN) 800 MG tablet Take 1 tablet (800 mg) by mouth every 6 hours as needed for other (cramping)  Qty: 100 tablet, Refills: 0    Associated Diagnoses: , delivered      senna-docusate (SENOKOT-S/PERICOLACE) 8.6-50 MG tablet Take 1 tablet by mouth 2 times daily as needed for constipation  Qty: 60 tablet, Refills: 0    Associated Diagnoses: , delivered         CONTINUE these medications which have CHANGED    Details   ferrous sulfate (FEROSUL) 325 (65 Fe) MG tablet Take 1 tablet (325 mg) by mouth 2 times daily  Qty: 60 tablet, Refills: 3    Associated Diagnoses: , delivered         CONTINUE these medications which have NOT CHANGED    Details   Prenatal Vit-Fe Fumarate-FA (PRENATAL MULTIVITAMIN  WITH IRON) 28-0.8 MG TABS Take 1 tablet by mouth daily  Qty: 30 each, Refills: 0      albuterol (PROAIR HFA, PROVENTIL HFA, VENTOLIN HFA) 108 (90 BASE) MCG/ACT inhaler Inhale 2 puffs into the lungs every 6 hours as needed for shortness of breath / dyspnea or wheezing  Qty: 1 Inhaler, Refills: 0      nicotine (NICODERM CQ) 14 MG/24HR 24 hr patch Place 1 patch onto the skin every 24 hours  Qty: 42 patch, Refills: 0    Comments: Use x6 weeks then go down to 7 mg patches  Associated Diagnoses: Encounter for smoking cessation counseling      order for DME Equipment being ordered: maternity support belt  Qty: 1 Device, Refills: 0    Associated Diagnoses: Encounter for supervision of other normal pregnancy in third trimester         STOP taking these medications       doxylamine (UNISOM) 25 MG TABS tablet Comments:   Reason for Stopping:         Doxylamine-Pyridoxine 10-10 MG TBEC Comments:   Reason for Stopping:         famotidine (PEPCID) 20 MG tablet Comments:   Reason for  Stopping:         famotidine (PEPCID) 20 MG tablet Comments:   Reason for Stopping:         nicotine (NICODERM CQ) 7 MG/24HR 24 hr patch Comments:   Reason for Stopping:         nitroFURantoin, macrocrystal-monohydrate, (MACROBID) 100 MG capsule Comments:   Reason for Stopping:         pyridOXINE (VITAMIN B-6) 25 MG tablet Comments:   Reason for Stopping:             Allergies   Allergies   Allergen Reactions     Penicillins Hives     Libia Guido CNM

## 2019-01-18 NOTE — PROVIDER NOTIFICATION
01/18/19 0913   Provider Notification   Provider Name/Title Anjali Wayne  (Financial counselor)   Method of Notification Phone   Request Evaluate in Person   Notification Reason (insurance issue)   Will send somebody to the unit to talk to her per Anjali BOURGEOIS

## 2019-01-18 NOTE — PLAN OF CARE
Data: Vital signs within normal limits. Postpartum checks within normal limits - see flow record. Patient eating and drinking normally. Patient able to empty bladder independently and is up ambulating.  Patient performing self cares and is able to care for infant.  Action: Patient medicated during the shift for cramping. See MAR. Patient reassessed within 1 hour after each medication and pain was improved - patient stated she was comfortable. Patient education done about discharge instructions .  Response: Positive attachment behaviors observed with infant. Support person Dereck present.   Plan: Discharge to home today at [phone]

## 2019-01-18 NOTE — PLAN OF CARE
"Patient claimed that her pain is well managed with current pain regimen. However, verbalized feeling weak and sleepy  at times. Was able to take a good nap this morning and claimed she feels better. She is bonding well with baby, breastfeeding on cue and independent with it. Will start IV iron once back from \"air break\". Will continue with plan of care.  "

## 2019-07-10 ENCOUNTER — OFFICE VISIT (OUTPATIENT)
Dept: MIDWIFE SERVICES | Facility: CLINIC | Age: 34
End: 2019-07-10
Payer: COMMERCIAL

## 2019-07-10 VITALS
SYSTOLIC BLOOD PRESSURE: 122 MMHG | BODY MASS INDEX: 26.13 KG/M2 | DIASTOLIC BLOOD PRESSURE: 81 MMHG | WEIGHT: 142 LBS | HEIGHT: 62 IN

## 2019-07-10 DIAGNOSIS — N30.00 ACUTE CYSTITIS WITHOUT HEMATURIA: ICD-10-CM

## 2019-07-10 DIAGNOSIS — B96.89 BV (BACTERIAL VAGINOSIS): ICD-10-CM

## 2019-07-10 DIAGNOSIS — R82.90 NONSPECIFIC FINDING ON EXAMINATION OF URINE: ICD-10-CM

## 2019-07-10 DIAGNOSIS — Z00.00 ANNUAL PHYSICAL EXAM: Primary | ICD-10-CM

## 2019-07-10 DIAGNOSIS — Z11.3 SCREEN FOR STD (SEXUALLY TRANSMITTED DISEASE): ICD-10-CM

## 2019-07-10 DIAGNOSIS — R30.0 DYSURIA: ICD-10-CM

## 2019-07-10 DIAGNOSIS — Z12.4 SCREENING FOR MALIGNANT NEOPLASM OF CERVIX: ICD-10-CM

## 2019-07-10 DIAGNOSIS — Z11.51 SCREENING FOR HUMAN PAPILLOMAVIRUS: ICD-10-CM

## 2019-07-10 DIAGNOSIS — Z71.6 ENCOUNTER FOR SMOKING CESSATION COUNSELING: ICD-10-CM

## 2019-07-10 DIAGNOSIS — N76.0 BV (BACTERIAL VAGINOSIS): ICD-10-CM

## 2019-07-10 LAB
ALBUMIN UR-MCNC: 30 MG/DL
APPEARANCE UR: CLEAR
BACTERIA #/AREA URNS HPF: ABNORMAL /HPF
BILIRUB UR QL STRIP: NEGATIVE
COLOR UR AUTO: YELLOW
GLUCOSE UR STRIP-MCNC: NEGATIVE MG/DL
HCG UR QL: NEGATIVE
HGB UR QL STRIP: ABNORMAL
KETONES UR STRIP-MCNC: NEGATIVE MG/DL
LEUKOCYTE ESTERASE UR QL STRIP: ABNORMAL
NITRATE UR QL: POSITIVE
NON-SQ EPI CELLS #/AREA URNS LPF: ABNORMAL /LPF
PH UR STRIP: 5.5 PH (ref 5–7)
RBC #/AREA URNS AUTO: ABNORMAL /HPF
SOURCE: ABNORMAL
SP GR UR STRIP: 1.02 (ref 1–1.03)
SPECIMEN SOURCE: ABNORMAL
UROBILINOGEN UR STRIP-ACNC: 0.2 EU/DL (ref 0.2–1)
WBC #/AREA URNS AUTO: ABNORMAL /HPF
WET PREP SPEC: ABNORMAL

## 2019-07-10 PROCEDURE — 99395 PREV VISIT EST AGE 18-39: CPT | Mod: 25 | Performed by: ADVANCED PRACTICE MIDWIFE

## 2019-07-10 PROCEDURE — 87086 URINE CULTURE/COLONY COUNT: CPT | Performed by: ADVANCED PRACTICE MIDWIFE

## 2019-07-10 PROCEDURE — 81025 URINE PREGNANCY TEST: CPT | Performed by: ADVANCED PRACTICE MIDWIFE

## 2019-07-10 PROCEDURE — G0145 SCR C/V CYTO,THINLAYER,RESCR: HCPCS | Performed by: ADVANCED PRACTICE MIDWIFE

## 2019-07-10 PROCEDURE — 99213 OFFICE O/P EST LOW 20 MIN: CPT | Mod: 25 | Performed by: ADVANCED PRACTICE MIDWIFE

## 2019-07-10 PROCEDURE — 87591 N.GONORRHOEAE DNA AMP PROB: CPT | Performed by: ADVANCED PRACTICE MIDWIFE

## 2019-07-10 PROCEDURE — 87088 URINE BACTERIA CULTURE: CPT | Performed by: ADVANCED PRACTICE MIDWIFE

## 2019-07-10 PROCEDURE — 96372 THER/PROPH/DIAG INJ SC/IM: CPT | Performed by: ADVANCED PRACTICE MIDWIFE

## 2019-07-10 PROCEDURE — 87186 SC STD MICRODIL/AGAR DIL: CPT | Performed by: ADVANCED PRACTICE MIDWIFE

## 2019-07-10 PROCEDURE — G0476 HPV COMBO ASSAY CA SCREEN: HCPCS | Performed by: ADVANCED PRACTICE MIDWIFE

## 2019-07-10 PROCEDURE — 87210 SMEAR WET MOUNT SALINE/INK: CPT | Performed by: ADVANCED PRACTICE MIDWIFE

## 2019-07-10 PROCEDURE — 81001 URINALYSIS AUTO W/SCOPE: CPT | Performed by: ADVANCED PRACTICE MIDWIFE

## 2019-07-10 PROCEDURE — 87491 CHLMYD TRACH DNA AMP PROBE: CPT | Performed by: ADVANCED PRACTICE MIDWIFE

## 2019-07-10 RX ORDER — NITROFURANTOIN 25; 75 MG/1; MG/1
100 CAPSULE ORAL 2 TIMES DAILY
Qty: 10 CAPSULE | Refills: 0 | Status: SHIPPED | OUTPATIENT
Start: 2019-07-10 | End: 2019-07-15

## 2019-07-10 RX ORDER — MULTIVITAMIN
1 TABLET ORAL DAILY
Qty: 100 TABLET | Refills: 1 | Status: SHIPPED | OUTPATIENT
Start: 2019-07-10

## 2019-07-10 RX ORDER — MEDROXYPROGESTERONE ACETATE 150 MG/ML
150 INJECTION, SUSPENSION INTRAMUSCULAR
Status: ACTIVE | OUTPATIENT
Start: 2019-07-10

## 2019-07-10 RX ORDER — METRONIDAZOLE 7.5 MG/G
1 GEL VAGINAL DAILY
Qty: 70 G | Refills: 0 | Status: SHIPPED | OUTPATIENT
Start: 2019-07-10 | End: 2019-07-17

## 2019-07-10 RX ADMIN — MEDROXYPROGESTERONE ACETATE 150 MG: 150 INJECTION, SUSPENSION INTRAMUSCULAR at 16:00

## 2019-07-10 ASSESSMENT — MIFFLIN-ST. JEOR: SCORE: 1292.36
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
## 2019-07-10 NOTE — NURSING NOTE
BP: 122/81    LAST PAP/EXAM: No results found for: PAP  URINE HCG:negative    The following medication was given:     MEDICATION: Depo Provera 150mg  ROUTE: IM  SITE: Deltoid - Right  : [employer]  LOT #: [id number]  EXP:1/2021  NEXT INJECTION DUE: 9/25/19 - 10/9/19   Provider: Warner Wbeb MA July 10, 2019      Clinic Administered Medication Documentation      Depo Provera Documentation    Prior to injection, verified patient identity using patient's name and date of birth. Medication was administered. Please see MAR and medication order for additional information. Patient instructed to remain in clinic for 15 minutes.    BP: 122/81    LAST PAP/EXAM: No results found for: PAP  URINE HCG:negative    NEXT INJECTION DUE: 9/25/19 - 10/9/19    Was entire vial of medication used? Yes  Vial/Syringe: Single dose vial  Expiration Date:  1/2021

## 2019-07-12 DIAGNOSIS — N30.00 ACUTE CYSTITIS WITHOUT HEMATURIA: Primary | ICD-10-CM

## 2019-07-12 LAB
BACTERIA SPEC CULT: ABNORMAL
COPATH REPORT: NORMAL
PAP: NORMAL
SPECIMEN SOURCE: ABNORMAL

## 2019-07-12 RX ORDER — CIPROFLOXACIN 250 MG/1
250 TABLET, FILM COATED ORAL 2 TIMES DAILY
Qty: 6 TABLET | Refills: 0 | Status: SHIPPED | OUTPATIENT
Start: 2019-07-12 | End: 2019-07-15

## 2019-07-15 LAB
FINAL DIAGNOSIS: NORMAL
HPV HR 12 DNA CVX QL NAA+PROBE: NEGATIVE
HPV16 DNA SPEC QL NAA+PROBE: NEGATIVE
HPV18 DNA SPEC QL NAA+PROBE: NEGATIVE
SPECIMEN DESCRIPTION: NORMAL
SPECIMEN SOURCE CVX/VAG CYTO: NORMAL

## 2019-07-19 PROBLEM — R87.619 ABNORMAL PAP SMEAR OF CERVIX: Status: ACTIVE | Noted: 2018-08-15

## 2024-06-17 ENCOUNTER — HOSPITAL ENCOUNTER (EMERGENCY)
Facility: CLINIC | Age: 39
Discharge: HOME | End: 2024-06-17
Admitting: PHYSICIAN ASSISTANT
Payer: COMMERCIAL

## 2024-06-17 VITALS
SYSTOLIC BLOOD PRESSURE: 130 MMHG | RESPIRATION RATE: 16 BRPM | TEMPERATURE: 98.4 F | HEART RATE: 76 BPM | DIASTOLIC BLOOD PRESSURE: 90 MMHG | OXYGEN SATURATION: 98 %

## 2024-06-17 DIAGNOSIS — L02.214 ABSCESS OF LEFT GROIN: ICD-10-CM

## 2024-06-17 PROCEDURE — 99283 EMERGENCY DEPT VISIT LOW MDM: CPT | Mod: 25 | Performed by: PHYSICIAN ASSISTANT

## 2024-06-17 PROCEDURE — 99284 EMERGENCY DEPT VISIT MOD MDM: CPT | Mod: 25 | Performed by: PHYSICIAN ASSISTANT

## 2024-06-17 PROCEDURE — 10060 I&D ABSCESS SIMPLE/SINGLE: CPT | Performed by: PHYSICIAN ASSISTANT

## 2024-06-17 RX ORDER — SULFAMETHOXAZOLE/TRIMETHOPRIM 800-160 MG
1 TABLET ORAL 2 TIMES DAILY
Qty: 10 TABLET | Refills: 0 | Status: SHIPPED | OUTPATIENT
Start: 2024-06-17 | End: 2024-06-22

## 2024-06-17 RX ORDER — LIDOCAINE HYDROCHLORIDE AND EPINEPHRINE 10; 10 MG/ML; UG/ML
10 INJECTION, SOLUTION INFILTRATION; PERINEURAL ONCE
Status: DISCONTINUED | OUTPATIENT
Start: 2024-06-17 | End: 2024-06-17 | Stop reason: HOSPADM

## 2024-06-17 RX ORDER — SODIUM CHLORIDE 9 MG/ML
INJECTION, SOLUTION INTRAVENOUS
Status: DISPENSED
Start: 2024-06-17 | End: 2024-06-18

## 2024-06-17 ASSESSMENT — ACTIVITIES OF DAILY LIVING (ADL): ADLS_ACUITY_SCORE: 35

## 2024-06-17 ASSESSMENT — COLUMBIA-SUICIDE SEVERITY RATING SCALE - C-SSRS
6. HAVE YOU EVER DONE ANYTHING, STARTED TO DO ANYTHING, OR PREPARED TO DO ANYTHING TO END YOUR LIFE?: NO
2. HAVE YOU ACTUALLY HAD ANY THOUGHTS OF KILLING YOURSELF IN THE PAST MONTH?: NO
1. IN THE PAST MONTH, HAVE YOU WISHED YOU WERE DEAD OR WISHED YOU COULD GO TO SLEEP AND NOT WAKE UP?: NO

## 2024-06-17 NOTE — DISCHARGE INSTRUCTIONS
Here in the emergency room I was able to open up your abscess and drain it.  There was a fair amount of pus.  We discussed between the drainage and the antibiotics that should be improving within the next few days recommend daily dressing changes avoiding swimming in any pools or lakes.  You can continue to use warm compresses to the area as well as Tylenol and ibuprofen for discomfort.  You may experience a small amount of bloody discharge over the next few days which is to be expected however if you notice any new or worsening symptoms such as severe pain swelling pus fevers return back to the emergency room.  I will place a referral to follow-up with primary care for a wound recheck.

## 2024-06-17 NOTE — ED PROVIDER NOTES
"ED Provider Note  [facility]      History     Chief Complaint   Patient presents with    Mass     Bump on left thigh, pt states she shaved a couple days ago after which she noticed a \"hair bump' which is very painful making it uncomfortable for her to walk.     HPI  Akua Gr is a 39 year old female without chronic medical problems who presents the emergency department with concerns for left groin swelling.  Patient states that she recently shaved her legs and about 2 to 3 days ago started noticing some pain swelling localized to the left groin/proximal left medial thigh region.  She has been trying to open the wound and was manipulating it without any drainage noted stating that the seem to make the pain worse.  She denies any systemic symptoms including any fevers chills body aches.  No vomiting.  No history of diabetes.  No history of MRSA.  She did try one-time using a warm compress to the area without any drainage.  No other concerns.    Past Medical History  Past Medical History:   Diagnosis Date    Anemia      Past Surgical History:   Procedure Laterality Date     SECTION       sulfamethoxazole-trimethoprim (BACTRIM DS) 800-160 MG tablet  albuterol (PROAIR HFA, PROVENTIL HFA, VENTOLIN HFA) 108 (90 BASE) MCG/ACT inhaler  multivitamin (ONE-DAILY) tablet  nicotine (NICODERM CQ) 14 MG/24HR 24 hr patch  order for DME  Prenatal Vit-Fe Fumarate-FA (PRENATAL MULTIVITAMIN  WITH IRON) 28-0.8 MG TABS      Allergies   Allergen Reactions    Penicillins Hives     Family History  Family History   Problem Relation Age of Onset    Hypertension Mother     Hypertension Maternal Grandmother      Social History   Social History     Tobacco Use    Smoking status: Every Day     Current packs/day: 0.25     Types: Cigarettes    Smokeless tobacco: Never    Tobacco comments:     1-2 cig/day   Substance Use Topics    Alcohol use: No    Drug use: No      A medically appropriate review of systems " was performed with pertinent positives and negatives noted in the HPI, and all other systems negative.    Physical Exam   BP: 113/75  Pulse: 85  Temp: 98.5  F (36.9  C)  Resp: 16  SpO2: 97 %  Physical Exam  GENERAL APPEARANCE: The patient is well developed, well appearing, and in no acute distress.  HEAD:  Normocephalic and atraumatic.   EENT: Voice normal.  NECK: Trachea is midline.  LUNGS: Breath sounds are equal and clear bilaterally. No wheezes, rhonchi, or rales.  HEART: Regular rate and normal rhythm.    EXTREMITIES: No cyanosis, clubbing, or edema.  NEUROLOGIC: No focal sensory or motor deficits are noted.  PSYCHIATRIC: The patient is awake, alert.  Appropriate mood and affect.  SKIN: Warm, dry, and well perfused. Good turgor.  Examination of the left groin shows approximately 3 cm fluctuant papule to the left groin area.  Area is mildly tender.  No open skin noted.  No current drainage no lymphangitic streaking no involvement of the labia.      ED Course, Procedures, & Data      [facility]    PROCEDURE: -Incision/Drainage    Date/Time: 6/17/2024 2:31 PM    Performed by: Leann Torres PA-C  Authorized by: Leann Torres PA-C    Risks, benefits and alternatives discussed.      LOCATION:      Type:  Abscess    Size:  3 cm    Location:  Lower extremity    Lower extremity location: Left groin.    PRE-PROCEDURE DETAILS:     Skin preparation:  Chloraprep    PROCEDURE TYPE:     Complexity:  Simple    ANESTHESIA (see MAR for exact dosages):     Anesthesia method:  Local infiltration    Local anesthetic:  Lidocaine 1% WITH epi    PROCEDURE DETAILS:     Needle aspiration: no      Incision types:  Single straight    Incision depth:  Dermal    Scalpel blade:  11    Wound management:  Probed and deloculated and irrigated with saline    Drainage:  Purulent    Drainage amount:  Moderate    Wound treatment:  Wound left open    Packing materials:  None (Adhesive dressing  placed over incision site)    PROCEDURE    Patient Tolerance:  Patient tolerated the procedure well with no immediate complications            No results found for any visits on 06/17/24.  Medications   lidocaine 1% with EPINEPHrine 1:100,000 injection 10 mL (has no administration in time range)     Labs Ordered and Resulted from Time of ED Arrival to Time of ED Departure - No data to display  No orders to display          Critical care was not performed.     Medical Decision Making  The patient's presentation was of moderate complexity (an undiagnosed new problem with uncertain prognosis).    The patient's evaluation involved:  history and exam without other MDM data elements    The patient's management necessitated moderate risk (prescription drug management including medications given in the ED) and moderate risk (a decision regarding minor procedure (incision & drainage) with risk factors of none).    Assessment & Plan    This is a 39-year-old female presenting with concerns for left groin swelling over the last several days after recently shaving her legs.  Presentation vitals within reference range.  She is afebrile here.  On exam she has findings consistent with a developing abscess of the left groin without lymphangitic streaking involvement of the labia or deep space involvement.  Discussed with patient recommendations for incision and drainage at bedside she was agreeable and after obtaining adequate anesthesia was able to incise the area of most fluctuance and expressed a moderate amount of purulent material from the wound thereafter wound was irrigated and I placed a nonstick dressing over the open incision site.  As there was some overlying erythema will cover for possible superimposed cellulitis with Bactrim.  Patient was encouraged to perform daily dressing changes and monitor for findings of complication.  She does not have primary care I will place a referral for her to follow-up for a wound  recheck.  Patient has no other questions or concerns at this time.  Red flag signs were addressed, and they were in agreement with the patient care plan provided.    I have reviewed the nursing notes. I have reviewed the findings, diagnosis, plan and need for follow up with the patient.    New Prescriptions    SULFAMETHOXAZOLE-TRIMETHOPRIM (BACTRIM DS) 800-160 MG TABLET    Take 1 tablet by mouth 2 times daily for 5 days       Final diagnoses:   Abscess of left groin       YOJANA Potts  [facility] EMERGENCY DEPARTMENT  6/17/2024

## 2024-06-22 ENCOUNTER — HEALTH MAINTENANCE LETTER (OUTPATIENT)
Age: 39
End: 2024-06-22

## 2025-07-12 ENCOUNTER — HEALTH MAINTENANCE LETTER (OUTPATIENT)
Age: 40
End: 2025-07-12